# Patient Record
Sex: FEMALE | Race: WHITE | Employment: OTHER | URBAN - METROPOLITAN AREA
[De-identification: names, ages, dates, MRNs, and addresses within clinical notes are randomized per-mention and may not be internally consistent; named-entity substitution may affect disease eponyms.]

---

## 2017-02-10 ENCOUNTER — HOSPITAL ENCOUNTER (OUTPATIENT)
Dept: MAMMOGRAPHY | Age: 82
Discharge: HOME | End: 2017-02-10
Attending: INTERNAL MEDICINE
Payer: MEDICARE

## 2017-02-10 DIAGNOSIS — Z12.31 VISIT FOR SCREENING MAMMOGRAM: ICD-10-CM

## 2017-02-10 PROCEDURE — 77067 SCR MAMMO BI INCL CAD: CPT

## 2017-07-10 ENCOUNTER — HOSPITAL ENCOUNTER (OUTPATIENT)
Dept: GENERAL RADIOLOGY | Age: 82
Discharge: HOME | End: 2017-07-10
Attending: INTERNAL MEDICINE
Payer: MEDICARE

## 2017-07-10 DIAGNOSIS — M1A.9XX1 TOPHUS: ICD-10-CM

## 2017-07-10 PROCEDURE — 73140 X-RAY EXAM OF FINGER(S): CPT

## 2017-07-12 PROBLEM — M1A.9XX1 TOPHACEOUS GOUT: Status: ACTIVE | Noted: 2017-07-12

## 2017-08-12 ENCOUNTER — APPOINTMENT (OUTPATIENT)
Dept: CT IMAGING | Age: 82
End: 2017-08-12
Attending: EMERGENCY MEDICINE
Payer: MEDICARE

## 2017-08-12 ENCOUNTER — HOSPITAL ENCOUNTER (EMERGENCY)
Age: 82
Discharge: HOME | End: 2017-08-12
Attending: EMERGENCY MEDICINE | Admitting: EMERGENCY MEDICINE
Payer: MEDICARE

## 2017-08-12 ENCOUNTER — APPOINTMENT (OUTPATIENT)
Dept: GENERAL RADIOLOGY | Age: 82
End: 2017-08-12
Attending: EMERGENCY MEDICINE
Payer: MEDICARE

## 2017-08-12 VITALS
HEART RATE: 67 BPM | WEIGHT: 140 LBS | DIASTOLIC BLOOD PRESSURE: 57 MMHG | OXYGEN SATURATION: 98 % | BODY MASS INDEX: 27.48 KG/M2 | SYSTOLIC BLOOD PRESSURE: 99 MMHG | TEMPERATURE: 98.4 F | HEIGHT: 60 IN | RESPIRATION RATE: 15 BRPM

## 2017-08-12 DIAGNOSIS — I25.10 CORONARY ARTERY CALCIFICATION: ICD-10-CM

## 2017-08-12 DIAGNOSIS — R42 ORTHOSTATIC DIZZINESS: Primary | ICD-10-CM

## 2017-08-12 DIAGNOSIS — I25.84 CORONARY ARTERY CALCIFICATION: ICD-10-CM

## 2017-08-12 DIAGNOSIS — Z86.69 H/O MIGRAINE: ICD-10-CM

## 2017-08-12 LAB
ALBUMIN SERPL BCP-MCNC: 3.4 G/DL (ref 3.5–5)
ALBUMIN/GLOB SERPL: 0.9 {RATIO} (ref 1.1–2.2)
ALP SERPL-CCNC: 70 U/L (ref 45–117)
ALT SERPL-CCNC: 19 U/L (ref 12–78)
ANION GAP BLD CALC-SCNC: 8 MMOL/L (ref 5–15)
APPEARANCE UR: CLEAR
AST SERPL W P-5'-P-CCNC: 20 U/L (ref 15–37)
BACTERIA URNS QL MICRO: NEGATIVE /HPF
BASOPHILS # BLD AUTO: 0 K/UL (ref 0–0.1)
BASOPHILS # BLD: 0 % (ref 0–1)
BILIRUB SERPL-MCNC: 0.4 MG/DL (ref 0.2–1)
BILIRUB UR QL: NEGATIVE
BUN SERPL-MCNC: 21 MG/DL (ref 6–20)
BUN/CREAT SERPL: 19 (ref 12–20)
CALCIUM SERPL-MCNC: 9 MG/DL (ref 8.5–10.1)
CHLORIDE SERPL-SCNC: 105 MMOL/L (ref 97–108)
CO2 SERPL-SCNC: 28 MMOL/L (ref 21–32)
COLOR UR: NORMAL
CREAT SERPL-MCNC: 1.1 MG/DL (ref 0.55–1.02)
EOSINOPHIL # BLD: 0.1 K/UL (ref 0–0.4)
EOSINOPHIL NFR BLD: 1 % (ref 0–7)
EPITH CASTS URNS QL MICRO: NORMAL /LPF
ERYTHROCYTE [DISTWIDTH] IN BLOOD BY AUTOMATED COUNT: 15.9 % (ref 11.5–14.5)
GLOBULIN SER CALC-MCNC: 3.9 G/DL (ref 2–4)
GLUCOSE SERPL-MCNC: 111 MG/DL (ref 65–100)
GLUCOSE UR STRIP.AUTO-MCNC: NEGATIVE MG/DL
HCT VFR BLD AUTO: 37.6 % (ref 35–47)
HGB BLD-MCNC: 11.9 G/DL (ref 11.5–16)
HGB UR QL STRIP: NEGATIVE
HYALINE CASTS URNS QL MICRO: NORMAL /LPF (ref 0–5)
KETONES UR QL STRIP.AUTO: NEGATIVE MG/DL
LEUKOCYTE ESTERASE UR QL STRIP.AUTO: NEGATIVE
LYMPHOCYTES # BLD AUTO: 45 % (ref 12–49)
LYMPHOCYTES # BLD: 4 K/UL (ref 0.8–3.5)
MCH RBC QN AUTO: 31.1 PG (ref 26–34)
MCHC RBC AUTO-ENTMCNC: 31.6 G/DL (ref 30–36.5)
MCV RBC AUTO: 98.2 FL (ref 80–99)
MONOCYTES # BLD: 0.7 K/UL (ref 0–1)
MONOCYTES NFR BLD AUTO: 8 % (ref 5–13)
NEUTS SEG # BLD: 4 K/UL (ref 1.8–8)
NEUTS SEG NFR BLD AUTO: 46 % (ref 32–75)
NITRITE UR QL STRIP.AUTO: NEGATIVE
PH UR STRIP: 5.5 [PH] (ref 5–8)
PLATELET # BLD AUTO: 303 K/UL (ref 150–400)
POTASSIUM SERPL-SCNC: 3.6 MMOL/L (ref 3.5–5.1)
PROT SERPL-MCNC: 7.3 G/DL (ref 6.4–8.2)
PROT UR STRIP-MCNC: NEGATIVE MG/DL
RBC # BLD AUTO: 3.83 M/UL (ref 3.8–5.2)
RBC #/AREA URNS HPF: NORMAL /HPF (ref 0–5)
SODIUM SERPL-SCNC: 141 MMOL/L (ref 136–145)
SP GR UR REFRACTOMETRY: 1.01 (ref 1–1.03)
TROPONIN I BLD-MCNC: <0.04 NG/ML (ref 0–0.08)
TROPONIN I SERPL-MCNC: <0.04 NG/ML
UROBILINOGEN UR QL STRIP.AUTO: 0.2 EU/DL (ref 0.2–1)
WBC # BLD AUTO: 8.8 K/UL (ref 3.6–11)
WBC URNS QL MICRO: NORMAL /HPF (ref 0–4)

## 2017-08-12 PROCEDURE — 70450 CT HEAD/BRAIN W/O DYE: CPT

## 2017-08-12 PROCEDURE — 84484 ASSAY OF TROPONIN QUANT: CPT | Performed by: EMERGENCY MEDICINE

## 2017-08-12 PROCEDURE — 96360 HYDRATION IV INFUSION INIT: CPT

## 2017-08-12 PROCEDURE — 70496 CT ANGIOGRAPHY HEAD: CPT

## 2017-08-12 PROCEDURE — 99285 EMERGENCY DEPT VISIT HI MDM: CPT

## 2017-08-12 PROCEDURE — 70498 CT ANGIOGRAPHY NECK: CPT

## 2017-08-12 PROCEDURE — 74011000258 HC RX REV CODE- 258: Performed by: EMERGENCY MEDICINE

## 2017-08-12 PROCEDURE — 94762 N-INVAS EAR/PLS OXIMTRY CONT: CPT

## 2017-08-12 PROCEDURE — 81001 URINALYSIS AUTO W/SCOPE: CPT | Performed by: EMERGENCY MEDICINE

## 2017-08-12 PROCEDURE — 85025 COMPLETE CBC W/AUTO DIFF WBC: CPT | Performed by: EMERGENCY MEDICINE

## 2017-08-12 PROCEDURE — 93005 ELECTROCARDIOGRAM TRACING: CPT

## 2017-08-12 PROCEDURE — 74011250636 HC RX REV CODE- 250/636: Performed by: EMERGENCY MEDICINE

## 2017-08-12 PROCEDURE — 71010 XR CHEST PORT: CPT

## 2017-08-12 PROCEDURE — 80053 COMPREHEN METABOLIC PANEL: CPT | Performed by: EMERGENCY MEDICINE

## 2017-08-12 PROCEDURE — 74011636320 HC RX REV CODE- 636/320: Performed by: EMERGENCY MEDICINE

## 2017-08-12 PROCEDURE — 87086 URINE CULTURE/COLONY COUNT: CPT | Performed by: EMERGENCY MEDICINE

## 2017-08-12 PROCEDURE — 36415 COLL VENOUS BLD VENIPUNCTURE: CPT | Performed by: EMERGENCY MEDICINE

## 2017-08-12 RX ORDER — SODIUM CHLORIDE 0.9 % (FLUSH) 0.9 %
10 SYRINGE (ML) INJECTION
Status: COMPLETED | OUTPATIENT
Start: 2017-08-12 | End: 2017-08-12

## 2017-08-12 RX ADMIN — SODIUM CHLORIDE 1000 ML: 900 INJECTION, SOLUTION INTRAVENOUS at 16:09

## 2017-08-12 RX ADMIN — Medication 10 ML: at 17:51

## 2017-08-12 RX ADMIN — SODIUM CHLORIDE 100 ML: 900 INJECTION, SOLUTION INTRAVENOUS at 17:51

## 2017-08-12 RX ADMIN — IOPAMIDOL 100 ML: 755 INJECTION, SOLUTION INTRAVENOUS at 17:51

## 2017-08-12 NOTE — ED PROVIDER NOTES
HPI Comments: 80 y.o. female with past medical history significant for venous insufficiency, left breast cancer, CAD, tophaceous gout, and hysterectomy who presents from home with chief complaint of syncopal episode. The pt reports that she was visiting her son in a surgical recovery room in the hospital when she felt sudden onset clamminess , floaters in vision, and dizziness leading to a syncopal episode 1 hour ago. The friend at bedside reports that she caught the pt before she hit the floor and she was unresponsive for a few minutes. The pt reports that her leg was dragging but she attributed it to chronic sciatic problems. The pt ate breakfast today but not lunch. The pt also had a left neck pain/posterior HA last night rated at 5/10 but it is not current. The pt has chronic hives and took Benadryl last night and this morning. The pt has not taken Benadryl prior to that for at least a week. The pt reports that she has not had a migraine in a few years and they usually present as a frontal HA. The pt had stents placed 2 years ago. The pt reports reduced fluid take over the last few days. The pt has been taken hypotensive in the past. The pt denies CP, SOB, urinary sx, cough, sluggish arm, wheezing, abdominal pain, slurred speech, and exertional SOB. There are no other acute medical concerns at this time. Social hx: nonsmoker, no EtOH use  PCP: Jesus Hope MD    Note written by Philly Bell, as dictated by Jessica Dinero MD 2:34 PM      The history is provided by the patient. No  was used.         Past Medical History:   Diagnosis Date    Breast cancer ([address] 1997    lumpectomy    Cancer ([address]     dcis left breast 2002    Coronary artery disease involving native coronary artery of native heart without angina pectoris 12/16/2016    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    Venous insufficiency 5/17/2011       Past Surgical History:   Procedure Laterality Date    HX BREAST LUMPECTOMY dcis lelf breast    HX HYSTERECTOMY      VASCULAR SURGERY PROCEDURE UNLIST      varicose veins         Family History:   Problem Relation Age of Onset    Cancer Sister      breast cancer    Breast Cancer Sister 61       Social History     Social History    Marital status:      Spouse name: N/A    Number of children: N/A    Years of education: N/A     Occupational History    Not on file. Social History Main Topics    Smoking status: Never Smoker    Smokeless tobacco: Never Used    Alcohol use No    Drug use: Not on file    Sexual activity: Not on file     Other Topics Concern    Not on file     Social History Narrative         ALLERGIES: Amoxicillin-pot clavulanate; Cephalexin; Doxycycline; Neomycin-polymyxin-hc; and Sulfamethoxazole-trimethoprim    Review of Systems   Constitutional: Negative for activity change, diaphoresis, fatigue and fever. Clammy   HENT: Negative for congestion and sore throat. Eyes: Positive for visual disturbance (floaters). Negative for photophobia. Respiratory: Negative for cough, chest tightness, shortness of breath and wheezing. Cardiovascular: Negative for chest pain, palpitations and leg swelling. Gastrointestinal: Negative for abdominal pain, blood in stool, constipation, diarrhea, nausea and vomiting. Genitourinary: Negative for difficulty urinating, dysuria, flank pain, frequency and hematuria. Musculoskeletal: Negative for back pain. Neurological: Positive for dizziness, syncope and headaches. Negative for speech difficulty and numbness. All other systems reviewed and are negative. Vitals:    08/12/17 1338 08/12/17 1340 08/12/17 1345 08/12/17 1347   BP: 124/61 124/61 109/68    Pulse: 78 75 87    Resp: 11 16 21    Temp:  98.6 °F (37 °C)     SpO2:  94% 92% 95%   Weight:  63.5 kg (140 lb)     Height:  5' (1.524 m)              Physical Exam   Constitutional: She is oriented to person, place, and time.  She appears well-developed and well-nourished. No distress. HENT:   Head: Normocephalic and atraumatic. Nose: Nose normal.   Mouth/Throat: Oropharynx is clear and moist. No oropharyngeal exudate. Eyes: Conjunctivae and EOM are normal. Right eye exhibits no discharge. Left eye exhibits no discharge. No scleral icterus. Neck: Normal range of motion. Neck supple. No JVD present. No tracheal deviation present. No thyromegaly present. Cardiovascular: Normal rate, regular rhythm, normal heart sounds and intact distal pulses. Exam reveals no gallop and no friction rub. No murmur heard. Pulmonary/Chest: Effort normal and breath sounds normal. No stridor. No respiratory distress. She has no wheezes. She has no rales. She exhibits no tenderness. Abdominal: Bowel sounds are normal. She exhibits no distension and no mass. There is no tenderness. There is no rebound. Musculoskeletal: Normal range of motion. She exhibits no edema or tenderness. Lymphadenopathy:     She has no cervical adenopathy. Neurological: She is alert and oriented to person, place, and time. No cranial nerve deficit. Coordination normal.   Skin: Skin is warm and dry. No rash noted. She is not diaphoretic. No erythema. No pallor. Psychiatric: She has a normal mood and affect. Her behavior is normal. Judgment and thought content normal.   Nursing note and vitals reviewed. Note written by Philly Sanchez, as dictated by Darleen Gerber MD 2:41 PM      [facility]  ED Course       Procedures  ED EKG interpretation:  Rhythm: normal sinus rhythm; and regular . Rate (approx.): 76; Axis: normal; ST/T wave: left ventricular hypertrophy with repolarization abnormality; inferior infarct, age undetermined; Note written by Philly Sanchez, as dictated by Darleen Gerber MD 2:41 PM      7:42 PM  The pt does not currently have a HA or any other sx. Will discharge the pt home.

## 2017-08-12 NOTE — DISCHARGE INSTRUCTIONS
Dizziness: Care Instructions  Your Care Instructions  Dizziness is the feeling of unsteadiness or fuzziness in your head. It is different than having vertigo, which is a feeling that the room is spinning or that you are moving or falling. It is also different from lightheadedness, which is the feeling that you are about to faint. It can be hard to know what causes dizziness. Some people feel dizzy when they have migraine headaches. Sometimes bouts of flu can make you feel dizzy. Some medical conditions, such as heart problems or high blood pressure, can make you feel dizzy. Many medicines can cause dizziness, including medicines for high blood pressure, pain, or anxiety. If a medicine causes your symptoms, your doctor may recommend that you stop or change the medicine. If it is a problem with your heart, you may need medicine to help your heart work better. If there is no clear reason for your symptoms, your doctor may suggest watching and waiting for a while to see if the dizziness goes away on its own. Follow-up care is a key part of your treatment and safety. Be sure to make and go to all appointments, and call your doctor if you are having problems. It's also a good idea to know your test results and keep a list of the medicines you take. How can you care for yourself at home? · If your doctor recommends or prescribes medicine, take it exactly as directed. Call your doctor if you think you are having a problem with your medicine. · Do not drive while you feel dizzy. · Try to prevent falls. Steps you can take include:  ¨ Using nonskid mats, adding grab bars near the tub, and using night-lights. ¨ Clearing your home so that walkways are free of anything you might trip on. ¨ Letting family and friends know that you have been feeling dizzy. This will help them know how to help you. When should you call for help? Call 911 anytime you think you may need emergency care.  For example, call if:  · You passed out (lost consciousness). · You have dizziness along with symptoms of a heart attack. These may include:  ¨ Chest pain or pressure, or a strange feeling in the chest.  ¨ Sweating. ¨ Shortness of breath. ¨ Nausea or vomiting. ¨ Pain, pressure, or a strange feeling in the back, neck, jaw, or upper belly or in one or both shoulders or arms. ¨ Lightheadedness or sudden weakness. ¨ A fast or irregular heartbeat. · You have symptoms of a stroke. These may include:  ¨ Sudden numbness, tingling, weakness, or loss of movement in your face, arm, or leg, especially on only one side of your body. ¨ Sudden vision changes. ¨ Sudden trouble speaking. ¨ Sudden confusion or trouble understanding simple statements. ¨ Sudden problems with walking or balance. ¨ A sudden, severe headache that is different from past headaches. Call your doctor now or seek immediate medical care if:  · You feel dizzy and have a fever, headache, or ringing in your ears. · You have new or increased nausea and vomiting. · Your dizziness does not go away or comes back. Watch closely for changes in your health, and be sure to contact your doctor if:  · You do not get better as expected. Where can you learn more? Go to http://hetal-skylar.info/. Enter I230 in the search box to learn more about \"Dizziness: Care Instructions. \"  Current as of: March 20, 2017  Content Version: 11.3  © 1533-8483 [employer] Care instructions adapted under license by [employer] (which disclaims liability or warranty for this information). If you have questions about a medical condition or this instruction, always ask your healthcare professional. [place] [address] any warranty or liability for your use of this information. We hope that we have addressed all of your medical concerns.  The examination and treatment you received in the Emergency Department were for an emergent problem and were not intended as complete care. It is important that you follow up with your healthcare provider(s) for ongoing care. If your symptoms worsen or do not improve as expected, and you are unable to reach your usual health care provider(s), you should return to the Emergency Department. Today's healthcare is undergoing tremendous change, and patient satisfaction surveys are one of the many tools to assess the quality of medical care. You may receive a survey from the [employer] regarding your experience in the Emergency Department. I hope that your experience has been completely positive, particularly the medical care that I provided. As such, please participate in the survey; anything less than excellent does not meet my expectations or intentions. [facility] and [facility] participate in nationally recognized quality of care measures. If your blood pressure is greater than 120/80, as reported below, we urge that you seek medical care to address the potential of high blood pressure, commonly known as hypertension. Hypertension can be hereditary or can be caused by certain medical conditions, pain, stress, or \"white coat syndrome. \"       Please make an appointment with your health care provider(s) for follow up of your Emergency Department visit. VITALS:   Patient Vitals for the past 8 hrs:   Temp Pulse Resp BP SpO2   08/12/17 1900 - 68 16 115/63 100 %   08/12/17 1830 - 77 12 116/80 98 %   08/12/17 1816 - 78 11 115/64 99 %   08/12/17 1730 - 66 15 117/71 100 %   08/12/17 1700 - 75 18 118/68 100 %   08/12/17 1630 - 69 12 135/78 99 %   08/12/17 1600 - 75 13 109/66 95 %   08/12/17 1500 - 75 19 112/63 95 %   08/12/17 1347 - - - - 95 %   08/12/17 1345 - 87 21 109/68 92 %   08/12/17 1340 98.6 °F (37 °C) 75 16 124/61 94 %   08/12/17 1338 - 78 11 124/61 -          Thank you for allowing us to provide you with medical care today.   We realize that you have many choices for your emergency care needs. Please choose us in the future for any continued health care needs. Robbi Cramer Severe, [address]   Office: [phone]            Recent Results (from the past 24 hour(s))   EKG, 12 LEAD, INITIAL    Collection Time: 08/12/17  1:46 PM   Result Value Ref Range    Ventricular Rate 76 BPM    Atrial Rate 76 BPM    P-R Interval 154 ms    QRS Duration 62 ms    Q-T Interval 366 ms    QTC Calculation (Bezet) 411 ms    Calculated P Axis 9 degrees    Calculated R Axis -17 degrees    Calculated T Axis 63 degrees    Diagnosis       Normal sinus rhythm  Left ventricular hypertrophy with repolarization abnormality  Inferior infarct (cited on or before 08-SEP-2014)  When compared with ECG of 08-SEP-2014 12:01,  premature ventricular complexes are no longer present  QRS duration has decreased     CBC WITH AUTOMATED DIFF    Collection Time: 08/12/17  1:48 PM   Result Value Ref Range    WBC 8.8 3.6 - 11.0 K/uL    RBC 3.83 3.80 - 5.20 M/uL    HGB 11.9 11.5 - 16.0 g/dL    HCT 37.6 35.0 - 47.0 %    MCV 98.2 80.0 - 99.0 FL    MCH 31.1 26.0 - 34.0 PG    MCHC 31.6 30.0 - 36.5 g/dL    RDW 15.9 (H) 11.5 - 14.5 %    PLATELET 805 750 - 550 K/uL    NEUTROPHILS 46 32 - 75 %    LYMPHOCYTES 45 12 - 49 %    MONOCYTES 8 5 - 13 %    EOSINOPHILS 1 0 - 7 %    BASOPHILS 0 0 - 1 %    ABS. NEUTROPHILS 4.0 1.8 - 8.0 K/UL    ABS. LYMPHOCYTES 4.0 (H) 0.8 - 3.5 K/UL    ABS. MONOCYTES 0.7 0.0 - 1.0 K/UL    ABS. EOSINOPHILS 0.1 0.0 - 0.4 K/UL    ABS.  BASOPHILS 0.0 0.0 - 0.1 K/UL   METABOLIC PANEL, COMPREHENSIVE    Collection Time: 08/12/17  1:48 PM   Result Value Ref Range    Sodium 141 136 - 145 mmol/L    Potassium 3.6 3.5 - 5.1 mmol/L    Chloride 105 97 - 108 mmol/L    CO2 28 21 - 32 mmol/L    Anion gap 8 5 - 15 mmol/L    Glucose 111 (H) 65 - 100 mg/dL    BUN 21 (H) 6 - 20 MG/DL    Creatinine 1.10 (H) 0.55 - 1.02 MG/DL    BUN/Creatinine ratio 19 12 - 20      GFR est AA 57 (L) >60 ml/min/1.73m2    GFR est non-AA 47 (L) >60 ml/min/1.73m2    Calcium 9.0 8.5 - 10.1 MG/DL    Bilirubin, total 0.4 0.2 - 1.0 MG/DL    ALT (SGPT) 19 12 - 78 U/L    AST (SGOT) 20 15 - 37 U/L    Alk. phosphatase 70 45 - 117 U/L    Protein, total 7.3 6.4 - 8.2 g/dL    Albumin 3.4 (L) 3.5 - 5.0 g/dL    Globulin 3.9 2.0 - 4.0 g/dL    A-G Ratio 0.9 (L) 1.1 - 2.2     TROPONIN I    Collection Time: 08/12/17  1:48 PM   Result Value Ref Range    Troponin-I, Qt. <0.04 <0.05 ng/mL       Ct Head Wo Cont    Result Date: 8/12/2017  EXAM:  CT HEAD WO CONT History: Syncope, blurred vision INDICATION:   syncope with LOC COMPARISON: 9/8/2014. CONTRAST:  None. TECHNIQUE: Unenhanced CT of the head was performed using 5 mm images. Brain and bone windows were generated. CT dose reduction was achieved through use of a standardized protocol tailored for this examination and automatic exposure control for dose modulation. FINDINGS: There are ventricular hypodensities. . Mild sulcal and ventricular prominence. . There is no intracranial hemorrhage, extra-axial collection, mass, mass effect or midline shift. The basilar cisterns are open. No acute infarct is identified. The bone windows demonstrate no abnormalities. The visualized portions of the paranasal sinuses and mastoid air cells are clear. IMPRESSION: No acute intracranial process     Cta Head    Result Date: 8/12/2017  Prelim report: Hypoplastic right A1 segment. No intracranial arterial stenosis. No carotid or vertebral artery stenosis or occlusion. atherosclerotic calcifications in the left and right carotid bulbs. Final report to follow. Cta Neck    Result Date: 8/12/2017  Prelim report: Hypoplastic right A1 segment. No intracranial arterial stenosis. No carotid or vertebral artery stenosis or occlusion. atherosclerotic calcifications in the left and right carotid bulbs. Final report to follow.     Xr Chest Port    Result Date: 8/12/2017  INDICATION: Syncope, loss consciousness. Dizziness, blurred vision. Portable AP semiupright view of the chest. There is no prior study for direct comparison. Cardiomediastinal silhouette is within normal limits. Lungs are clear bilaterally. Pleural spaces are normal. Osseous structures are intact. IMPRESSION: No acute cardiopulmonary disease.

## 2017-08-12 NOTE — ED TRIAGE NOTES
Pt rapid response from 5E for syncopal event. Pt was visiting her son and  who are both patient's on 5E. Pt's son witnessed syncopal episode with LOC for a few seconds. Pt reports feeling dizzy with blurred vision in both eyes prior to episode. Pt reports symptoms have resolved at this time.  Denies CP or sob

## 2017-08-13 LAB
ATRIAL RATE: 76 BPM
CALCULATED P AXIS, ECG09: 9 DEGREES
CALCULATED R AXIS, ECG10: -17 DEGREES
CALCULATED T AXIS, ECG11: 63 DEGREES
DIAGNOSIS, 93000: NORMAL
P-R INTERVAL, ECG05: 154 MS
Q-T INTERVAL, ECG07: 366 MS
QRS DURATION, ECG06: 62 MS
QTC CALCULATION (BEZET), ECG08: 411 MS
VENTRICULAR RATE, ECG03: 76 BPM

## 2017-08-13 NOTE — ED NOTES
Bedside shift change report given to [facility] (oncoming nurse) by Henrry Miller RN (offgoing nurse). Report included the following information SBAR, ED Summary, MAR and Recent Results.

## 2017-08-14 LAB
BACTERIA SPEC CULT: NORMAL
CC UR VC: NORMAL
SERVICE CMNT-IMP: NORMAL

## 2018-04-05 ENCOUNTER — HOSPITAL ENCOUNTER (OUTPATIENT)
Dept: PREADMISSION TESTING | Age: 83
Discharge: HOME | End: 2018-04-05
Payer: MEDICARE

## 2018-04-05 VITALS
BODY MASS INDEX: 29.06 KG/M2 | HEART RATE: 67 BPM | SYSTOLIC BLOOD PRESSURE: 146 MMHG | TEMPERATURE: 97.7 F | HEIGHT: 60 IN | DIASTOLIC BLOOD PRESSURE: 80 MMHG | WEIGHT: 148 LBS

## 2018-04-05 LAB
ABO + RH BLD: NORMAL
ANION GAP SERPL CALC-SCNC: 7 MMOL/L (ref 5–15)
APPEARANCE UR: CLEAR
ATRIAL RATE: 55 BPM
BACTERIA URNS QL MICRO: NEGATIVE /HPF
BILIRUB UR QL: NEGATIVE
BLOOD GROUP ANTIBODIES SERPL: NORMAL
BUN SERPL-MCNC: 20 MG/DL (ref 6–20)
BUN/CREAT SERPL: 19 (ref 12–20)
CALCIUM SERPL-MCNC: 9.6 MG/DL (ref 8.5–10.1)
CALCULATED P AXIS, ECG09: 60 DEGREES
CALCULATED R AXIS, ECG10: -13 DEGREES
CALCULATED T AXIS, ECG11: 12 DEGREES
CHLORIDE SERPL-SCNC: 103 MMOL/L (ref 97–108)
CO2 SERPL-SCNC: 31 MMOL/L (ref 21–32)
COLOR UR: ABNORMAL
CREAT SERPL-MCNC: 1.03 MG/DL (ref 0.55–1.02)
DIAGNOSIS, 93000: NORMAL
EPITH CASTS URNS QL MICRO: ABNORMAL /LPF
ERYTHROCYTE [DISTWIDTH] IN BLOOD BY AUTOMATED COUNT: 14.7 % (ref 11.5–14.5)
EST. AVERAGE GLUCOSE BLD GHB EST-MCNC: 131 MG/DL
GLUCOSE SERPL-MCNC: 109 MG/DL (ref 65–100)
GLUCOSE UR STRIP.AUTO-MCNC: NEGATIVE MG/DL
HBA1C MFR BLD: 6.2 % (ref 4.2–6.3)
HCT VFR BLD AUTO: 37.1 % (ref 35–47)
HGB BLD-MCNC: 11.8 G/DL (ref 11.5–16)
HGB UR QL STRIP: NEGATIVE
HYALINE CASTS URNS QL MICRO: ABNORMAL /LPF (ref 0–5)
INR PPP: 1 (ref 0.9–1.1)
KETONES UR QL STRIP.AUTO: NEGATIVE MG/DL
LEUKOCYTE ESTERASE UR QL STRIP.AUTO: ABNORMAL
MCH RBC QN AUTO: 31.5 PG (ref 26–34)
MCHC RBC AUTO-ENTMCNC: 31.8 G/DL (ref 30–36.5)
MCV RBC AUTO: 98.9 FL (ref 80–99)
NITRITE UR QL STRIP.AUTO: NEGATIVE
NRBC # BLD: 0 K/UL (ref 0–0.01)
NRBC BLD-RTO: 0 PER 100 WBC
P-R INTERVAL, ECG05: 182 MS
PH UR STRIP: 6.5 [PH] (ref 5–8)
PLATELET # BLD AUTO: 215 K/UL (ref 150–400)
PMV BLD AUTO: 11.1 FL (ref 8.9–12.9)
POTASSIUM SERPL-SCNC: 4.4 MMOL/L (ref 3.5–5.1)
PROT UR STRIP-MCNC: NEGATIVE MG/DL
PROTHROMBIN TIME: 10.1 SEC (ref 9–11.1)
Q-T INTERVAL, ECG07: 442 MS
QRS DURATION, ECG06: 80 MS
QTC CALCULATION (BEZET), ECG08: 422 MS
RBC # BLD AUTO: 3.75 M/UL (ref 3.8–5.2)
RBC #/AREA URNS HPF: ABNORMAL /HPF (ref 0–5)
SODIUM SERPL-SCNC: 141 MMOL/L (ref 136–145)
SP GR UR REFRACTOMETRY: 1.01 (ref 1–1.03)
SPECIMEN EXP DATE BLD: NORMAL
UA: UC IF INDICATED,UAUC: ABNORMAL
UROBILINOGEN UR QL STRIP.AUTO: 0.2 EU/DL (ref 0.2–1)
VENTRICULAR RATE, ECG03: 55 BPM
WBC # BLD AUTO: 7.7 K/UL (ref 3.6–11)
WBC URNS QL MICRO: ABNORMAL /HPF (ref 0–4)

## 2018-04-05 PROCEDURE — 83036 HEMOGLOBIN GLYCOSYLATED A1C: CPT | Performed by: ORTHOPAEDIC SURGERY

## 2018-04-05 PROCEDURE — 36415 COLL VENOUS BLD VENIPUNCTURE: CPT | Performed by: ORTHOPAEDIC SURGERY

## 2018-04-05 PROCEDURE — 85027 COMPLETE CBC AUTOMATED: CPT | Performed by: ORTHOPAEDIC SURGERY

## 2018-04-05 PROCEDURE — 86900 BLOOD TYPING SEROLOGIC ABO: CPT | Performed by: ORTHOPAEDIC SURGERY

## 2018-04-05 PROCEDURE — 80048 BASIC METABOLIC PNL TOTAL CA: CPT | Performed by: ORTHOPAEDIC SURGERY

## 2018-04-05 PROCEDURE — 85610 PROTHROMBIN TIME: CPT | Performed by: ORTHOPAEDIC SURGERY

## 2018-04-05 PROCEDURE — 93005 ELECTROCARDIOGRAM TRACING: CPT

## 2018-04-05 PROCEDURE — 81001 URINALYSIS AUTO W/SCOPE: CPT | Performed by: ORTHOPAEDIC SURGERY

## 2018-04-06 LAB
BACTERIA SPEC CULT: NORMAL
BACTERIA SPEC CULT: NORMAL
SERVICE CMNT-IMP: NORMAL

## 2018-04-12 NOTE — H&P
Jas Ghotra  Location: [place] [address] [facility]  Patient #: [id number]  : 1933   / Language: [place] / Race: White  Female      History of Present Illness   The patient is a 80year old female who presents to the practice today for a transition into care. Additional reasons for visit:    Hip Pain is described as the following: The hip pain is described as severe. The hip pain is described as being located in the hip. Relieving factors include heat and ice. Assistive devices have included cane and crutch. Note for \"Hip Pain\": Patient presents today for an opinion regarding her left lower extremity pain. About 10 months ago she started having increased symptoms. She developed pain both down her back and in her anterior thigh. It was a bit of a diagnostic dilemma. She was treated both for sciatica and her hip pain. Her symptoms progress. She's not using a cane to get around in her right hand. She has lost significant hip motion. She's having problems getting her shoes and socks on on the left side. She was recently seen by one of our spine PAs and x-rays showed severe DJD of her left hip. She presents for an opinion regarding her left hip pain. Problem List/Past Medical   Osteoarthritis of left hip, unspecified osteoarthritis type (715.95  M16.12)    Weight above 97th percentile (V49.89  Z78.9)    REVIEW OF SYSTEMS: Systems were reviewed by the provider.    Left lumbar radiculitis (724.4  M54.16)    Spondylolisthesis, lumbar region (738.4  M43.16)      Allergies  Augmentin *PENICILLINS*    Bactrim *ANTI-INFECTIVE AGENTS - MISC. *    Doxycycline Hyclate *TETRACYCLINES*    Cephalexin *CEPHALOSPORINS*    Allergies Reconciled      Social History  Caffeine use   Occasionally. Current work status   Part-time. Exercise   cycling. Marital status   . No alcohol use    No drug use    Seat Belt Use   Always uses seat belts. Sun Exposure   Occasionally.   Tobacco / smoke exposure   None.  Tobacco use   Never smoker. Medication History   Allopurinol  (300MG Tablet, Oral) Active. Gabapentin  (300MG Capsule, 1 (one) Capsule Oral tid, Taken starting 01/04/2018) Active. (called in [facility])  Lisinopril  (10MG Tablet, Oral) Active. Bumetanide  (2MG Tablet, Oral) Active. TraMADol HCl  (50MG Tablet, 1 (one) Tablet Oral evey8-12 hrs prn, Taken starting 01/04/2018) Active. ([facility])  Atorvastatin Calcium  (20MG Tablet, Oral) Active. Metoprolol Succinate ER  (50MG Tablet ER 24HR, Oral) Active. Vitamin D  (Oral) Specific strength unknown - Active. Plavix  (75MG Tablet, Oral) Active. Aspirin  (81MG Tablet, Oral) Active. Medications Reconciled     Pregnancy / Birth History  Pregnant   No.    Past Surgical History  Breast Mass; Local Excision   bilateral  Cataract Surgery   bilateral  Hysterectomy   non-cancerous: complete  Other Heart Procedures   Stent  Tubal Ligation      Other Problems  Allergic Urticaria    Cerebrovascular Accident    Heart disease    Lumbar spinal stenosis (724.02  M48.061)          Review of Systems  General Not Present- Chills and Fatigue. Skin Not Present- Bruising, Pallor and Skin Color Changes. Respiratory Not Present- Cough and Difficulty Breathing. Cardiovascular Not Present- Chest Pain, Fainting / Blacking Out and Rapid Heart Rate. Musculoskeletal Not Present- Decreased Range of Motion, Joint Pain and Joint Swelling. Neurological Not Present- Dysesthesia, Paresthesias and Weakness In Extremities. Hematology Not Present- Abnormal Bleeding, Blood Clots and Petechiae. Vitals  Weight: 140 lb   Height: 60 in   Body Surface Area: 1.6 m²   Body Mass Index: 27.34 kg/m²          Physical Exam  Musculoskeletal  Global Assessment  Examination of related systems reveals - well-developed, well-nourished, in no acute distress, alert and oriented x 3. Left Lower Extremity - Note: Patient walks with a cane. She has significant limp when she walks.  Seated she has severe pain to rotation of her left hip. Left hip has significant diminished rotation. Hip does not extend fully and flexes to about 85°. 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 Seated stage IV AVN. Significant collapse on lateral x-ray. Discussed this with her cardiologist. She is indicated for hip replacement surgery should she decide to proceed. She's had a stroke which seems to have resolved. Her cardiologist states that her ejection fraction is around 40% in her cardiac status is stable. Current Plans  Pt Education - How to access health information online: discussed with patient and provided information. Pt Education - Educational materials were provided.: discussed with patient and provided information. X-RAY EXAM OF HIP COMPLETE min 2 VIEWS (43624) (left reviewed her x-rays. Patient has collapse avascular necrosis of her femoral head.  This is best seen on the lateral view.)  REVIEW OF SYSTEMS: Systems were reviewed by the provider.(V49.9)  Left hip pain (719.45  M25.552)        Signed by Elgin Cannon MD

## 2018-04-17 ENCOUNTER — ANESTHESIA EVENT (OUTPATIENT)
Dept: SURGERY | Age: 83
DRG: 470 | End: 2018-04-17
Payer: MEDICARE

## 2018-04-17 ENCOUNTER — ANESTHESIA (OUTPATIENT)
Dept: SURGERY | Age: 83
DRG: 470 | End: 2018-04-17
Payer: MEDICARE

## 2018-04-17 ENCOUNTER — APPOINTMENT (OUTPATIENT)
Dept: GENERAL RADIOLOGY | Age: 83
DRG: 470 | End: 2018-04-17
Attending: ORTHOPAEDIC SURGERY
Payer: MEDICARE

## 2018-04-17 ENCOUNTER — APPOINTMENT (OUTPATIENT)
Dept: GENERAL RADIOLOGY | Age: 83
DRG: 470 | End: 2018-04-17
Attending: PHYSICIAN ASSISTANT
Payer: MEDICARE

## 2018-04-17 ENCOUNTER — HOSPITAL ENCOUNTER (INPATIENT)
Age: 83
LOS: 3 days | Discharge: NURSING, REHAB OR LONG-TERM CARE | DRG: 470 | End: 2018-04-20
Attending: ORTHOPAEDIC SURGERY | Admitting: ORTHOPAEDIC SURGERY
Payer: MEDICARE

## 2018-04-17 DIAGNOSIS — I87.2 VENOUS INSUFFICIENCY: Primary | ICD-10-CM

## 2018-04-17 LAB
GLUCOSE BLD STRIP.AUTO-MCNC: 94 MG/DL (ref 65–100)
SERVICE CMNT-IMP: NORMAL

## 2018-04-17 PROCEDURE — 73501 X-RAY EXAM HIP UNI 1 VIEW: CPT

## 2018-04-17 PROCEDURE — 65270000029 HC RM PRIVATE

## 2018-04-17 PROCEDURE — 97116 GAIT TRAINING THERAPY: CPT | Performed by: PHYSICAL THERAPIST

## 2018-04-17 PROCEDURE — 77030013079 HC BLNKT BAIR HGGR 3M -A: Performed by: ANESTHESIOLOGY

## 2018-04-17 PROCEDURE — 74011000250 HC RX REV CODE- 250

## 2018-04-17 PROCEDURE — 77030018836 HC SOL IRR NACL ICUM -A: Performed by: ORTHOPAEDIC SURGERY

## 2018-04-17 PROCEDURE — 77030031139 HC SUT VCRL2 J&J -A: Performed by: ORTHOPAEDIC SURGERY

## 2018-04-17 PROCEDURE — 82962 GLUCOSE BLOOD TEST: CPT

## 2018-04-17 PROCEDURE — 77030018846 HC SOL IRR STRL H20 ICUM -A: Performed by: ORTHOPAEDIC SURGERY

## 2018-04-17 PROCEDURE — 74011250636 HC RX REV CODE- 250/636

## 2018-04-17 PROCEDURE — 0SRB04A REPLACEMENT OF LEFT HIP JOINT WITH CERAMIC ON POLYETHYLENE SYNTHETIC SUBSTITUTE, UNCEMENTED, OPEN APPROACH: ICD-10-PCS | Performed by: ORTHOPAEDIC SURGERY

## 2018-04-17 PROCEDURE — C9290 INJ, BUPIVACAINE LIPOSOME: HCPCS | Performed by: ORTHOPAEDIC SURGERY

## 2018-04-17 PROCEDURE — 74011250636 HC RX REV CODE- 250/636: Performed by: PHYSICIAN ASSISTANT

## 2018-04-17 PROCEDURE — C1776 JOINT DEVICE (IMPLANTABLE): HCPCS | Performed by: ORTHOPAEDIC SURGERY

## 2018-04-17 PROCEDURE — 77030012935 HC DRSG AQUACEL BMS -B: Performed by: ORTHOPAEDIC SURGERY

## 2018-04-17 PROCEDURE — 74011000250 HC RX REV CODE- 250: Performed by: ORTHOPAEDIC SURGERY

## 2018-04-17 PROCEDURE — 74011250636 HC RX REV CODE- 250/636: Performed by: ANESTHESIOLOGY

## 2018-04-17 PROCEDURE — 77030008467 HC STPLR SKN COVD -B: Performed by: ORTHOPAEDIC SURGERY

## 2018-04-17 PROCEDURE — 76000 FLUOROSCOPY <1 HR PHYS/QHP: CPT

## 2018-04-17 PROCEDURE — P9045 ALBUMIN (HUMAN), 5%, 250 ML: HCPCS

## 2018-04-17 PROCEDURE — 77030011640 HC PAD GRND REM COVD -A: Performed by: ORTHOPAEDIC SURGERY

## 2018-04-17 PROCEDURE — 74011250636 HC RX REV CODE- 250/636: Performed by: ORTHOPAEDIC SURGERY

## 2018-04-17 PROCEDURE — 74011250637 HC RX REV CODE- 250/637: Performed by: PHYSICIAN ASSISTANT

## 2018-04-17 PROCEDURE — 97161 PT EVAL LOW COMPLEX 20 MIN: CPT | Performed by: PHYSICAL THERAPIST

## 2018-04-17 PROCEDURE — 76010000171 HC OR TIME 2 TO 2.5 HR INTENSV-TIER 1: Performed by: ORTHOPAEDIC SURGERY

## 2018-04-17 PROCEDURE — 77030008684 HC TU ET CUF COVD -B: Performed by: ANESTHESIOLOGY

## 2018-04-17 PROCEDURE — 76060000035 HC ANESTHESIA 2 TO 2.5 HR: Performed by: ORTHOPAEDIC SURGERY

## 2018-04-17 PROCEDURE — 76210000017 HC OR PH I REC 1.5 TO 2 HR: Performed by: ORTHOPAEDIC SURGERY

## 2018-04-17 PROCEDURE — 74011250637 HC RX REV CODE- 250/637: Performed by: ORTHOPAEDIC SURGERY

## 2018-04-17 PROCEDURE — 77030034850: Performed by: ORTHOPAEDIC SURGERY

## 2018-04-17 PROCEDURE — 77030020782 HC GWN BAIR PAWS FLX 3M -B

## 2018-04-17 PROCEDURE — 77030006783 HC BLD SAW OSC MCRA -A: Performed by: ORTHOPAEDIC SURGERY

## 2018-04-17 PROCEDURE — 74011000258 HC RX REV CODE- 258: Performed by: ORTHOPAEDIC SURGERY

## 2018-04-17 DEVICE — PINNACLE CANCELLOUS BONE SCREW 6.5MM X 35MM
Type: IMPLANTABLE DEVICE | Site: HIP | Status: FUNCTIONAL
Brand: PINNACLE

## 2018-04-17 DEVICE — PINNACLE GRIPTION ACETABULAR SHELL SECTOR 50MM OD
Type: IMPLANTABLE DEVICE | Site: HIP | Status: FUNCTIONAL
Brand: PINNACLE GRIPTION

## 2018-04-17 DEVICE — APEX HOLE ELIMINATOR - PS
Type: IMPLANTABLE DEVICE | Site: HIP | Status: FUNCTIONAL
Brand: APEX

## 2018-04-17 DEVICE — BIOLOX DELTA CERAMIC FEMORAL HEAD 32MM DIA +1 12/14 TAPER
Type: IMPLANTABLE DEVICE | Site: HIP | Status: FUNCTIONAL
Brand: BIOLOX DELTA

## 2018-04-17 DEVICE — PINNACLE HIP SOLUTIONS ALTRX POLYETHYLENE ACETABULAR LINER NEUTRAL 32MM ID 50MM OD
Type: IMPLANTABLE DEVICE | Site: HIP | Status: FUNCTIONAL
Brand: PINNACLE ALTRX

## 2018-04-17 DEVICE — PINNACLE CANCELLOUS BONE SCREW 6.5MM X 15MM
Type: IMPLANTABLE DEVICE | Site: HIP | Status: FUNCTIONAL
Brand: PINNACLE

## 2018-04-17 DEVICE — CORAIL HIP SYSTEM CEMENTLESS FEMORAL STEM 12/14 AMT 135 DEGREES KA SIZE 10 HA COATED STANDARD COLLAR
Type: IMPLANTABLE DEVICE | Site: HIP | Status: FUNCTIONAL
Brand: CORAIL

## 2018-04-17 DEVICE — COMPONENT TOT HIP PRIMARY CERM ALTRX: Type: IMPLANTABLE DEVICE | Status: FUNCTIONAL

## 2018-04-17 RX ORDER — TRAMADOL HYDROCHLORIDE 50 MG/1
50 TABLET ORAL
Status: DISCONTINUED | OUTPATIENT
Start: 2018-04-17 | End: 2018-04-20 | Stop reason: HOSPADM

## 2018-04-17 RX ORDER — ACETAMINOPHEN 500 MG
1000 TABLET ORAL EVERY 6 HOURS
Status: DISCONTINUED | OUTPATIENT
Start: 2018-04-17 | End: 2018-04-20 | Stop reason: HOSPADM

## 2018-04-17 RX ORDER — SODIUM CHLORIDE 0.9 % (FLUSH) 0.9 %
5-10 SYRINGE (ML) INJECTION AS NEEDED
Status: DISCONTINUED | OUTPATIENT
Start: 2018-04-17 | End: 2018-04-17 | Stop reason: HOSPADM

## 2018-04-17 RX ORDER — SUCCINYLCHOLINE CHLORIDE 20 MG/ML
INJECTION INTRAMUSCULAR; INTRAVENOUS AS NEEDED
Status: DISCONTINUED | OUTPATIENT
Start: 2018-04-17 | End: 2018-04-17 | Stop reason: HOSPADM

## 2018-04-17 RX ORDER — OXYCODONE HYDROCHLORIDE 5 MG/1
10 TABLET ORAL
Status: DISCONTINUED | OUTPATIENT
Start: 2018-04-17 | End: 2018-04-20 | Stop reason: HOSPADM

## 2018-04-17 RX ORDER — PHENYLEPHRINE HCL IN 0.9% NACL 0.4MG/10ML
SYRINGE (ML) INTRAVENOUS AS NEEDED
Status: DISCONTINUED | OUTPATIENT
Start: 2018-04-17 | End: 2018-04-17 | Stop reason: HOSPADM

## 2018-04-17 RX ORDER — SODIUM CHLORIDE 0.9 % (FLUSH) 0.9 %
5-10 SYRINGE (ML) INJECTION EVERY 8 HOURS
Status: DISCONTINUED | OUTPATIENT
Start: 2018-04-18 | End: 2018-04-20 | Stop reason: HOSPADM

## 2018-04-17 RX ORDER — MIDAZOLAM HYDROCHLORIDE 1 MG/ML
0.5 INJECTION, SOLUTION INTRAMUSCULAR; INTRAVENOUS
Status: DISCONTINUED | OUTPATIENT
Start: 2018-04-17 | End: 2018-04-17 | Stop reason: HOSPADM

## 2018-04-17 RX ORDER — HYDROMORPHONE HYDROCHLORIDE 2 MG/ML
0.2 INJECTION, SOLUTION INTRAMUSCULAR; INTRAVENOUS; SUBCUTANEOUS
Status: COMPLETED | OUTPATIENT
Start: 2018-04-17 | End: 2018-04-17

## 2018-04-17 RX ORDER — OXYCODONE HYDROCHLORIDE 5 MG/1
5 TABLET ORAL AS NEEDED
Status: DISCONTINUED | OUTPATIENT
Start: 2018-04-17 | End: 2018-04-17 | Stop reason: HOSPADM

## 2018-04-17 RX ORDER — SODIUM CHLORIDE, SODIUM LACTATE, POTASSIUM CHLORIDE, CALCIUM CHLORIDE 600; 310; 30; 20 MG/100ML; MG/100ML; MG/100ML; MG/100ML
1000 INJECTION, SOLUTION INTRAVENOUS CONTINUOUS
Status: DISCONTINUED | OUTPATIENT
Start: 2018-04-17 | End: 2018-04-17 | Stop reason: HOSPADM

## 2018-04-17 RX ORDER — SODIUM CHLORIDE, SODIUM LACTATE, POTASSIUM CHLORIDE, CALCIUM CHLORIDE 600; 310; 30; 20 MG/100ML; MG/100ML; MG/100ML; MG/100ML
INJECTION, SOLUTION INTRAVENOUS
Status: DISCONTINUED | OUTPATIENT
Start: 2018-04-17 | End: 2018-04-17 | Stop reason: HOSPADM

## 2018-04-17 RX ORDER — POLYETHYLENE GLYCOL 3350 17 G/17G
17 POWDER, FOR SOLUTION ORAL DAILY
Status: DISCONTINUED | OUTPATIENT
Start: 2018-04-18 | End: 2018-04-20 | Stop reason: HOSPADM

## 2018-04-17 RX ORDER — ONDANSETRON 2 MG/ML
4 INJECTION INTRAMUSCULAR; INTRAVENOUS AS NEEDED
Status: DISCONTINUED | OUTPATIENT
Start: 2018-04-17 | End: 2018-04-17 | Stop reason: HOSPADM

## 2018-04-17 RX ORDER — MIDAZOLAM HYDROCHLORIDE 1 MG/ML
INJECTION, SOLUTION INTRAMUSCULAR; INTRAVENOUS AS NEEDED
Status: DISCONTINUED | OUTPATIENT
Start: 2018-04-17 | End: 2018-04-17 | Stop reason: HOSPADM

## 2018-04-17 RX ORDER — MORPHINE SULFATE 10 MG/ML
2 INJECTION, SOLUTION INTRAMUSCULAR; INTRAVENOUS
Status: DISCONTINUED | OUTPATIENT
Start: 2018-04-17 | End: 2018-04-17 | Stop reason: HOSPADM

## 2018-04-17 RX ORDER — LIDOCAINE HYDROCHLORIDE 10 MG/ML
0.1 INJECTION, SOLUTION EPIDURAL; INFILTRATION; INTRACAUDAL; PERINEURAL AS NEEDED
Status: DISCONTINUED | OUTPATIENT
Start: 2018-04-17 | End: 2018-04-17 | Stop reason: HOSPADM

## 2018-04-17 RX ORDER — SODIUM CHLORIDE 9 MG/ML
125 INJECTION, SOLUTION INTRAVENOUS CONTINUOUS
Status: DISPENSED | OUTPATIENT
Start: 2018-04-17 | End: 2018-04-18

## 2018-04-17 RX ORDER — SODIUM CHLORIDE 9 MG/ML
25 INJECTION, SOLUTION INTRAVENOUS CONTINUOUS
Status: DISCONTINUED | OUTPATIENT
Start: 2018-04-17 | End: 2018-04-17 | Stop reason: HOSPADM

## 2018-04-17 RX ORDER — NITROGLYCERIN 0.4 MG/1
0.4 TABLET SUBLINGUAL AS NEEDED
Status: DISCONTINUED | OUTPATIENT
Start: 2018-04-17 | End: 2018-04-20 | Stop reason: HOSPADM

## 2018-04-17 RX ORDER — NEOSTIGMINE METHYLSULFATE 1 MG/ML
INJECTION INTRAVENOUS AS NEEDED
Status: DISCONTINUED | OUTPATIENT
Start: 2018-04-17 | End: 2018-04-17 | Stop reason: HOSPADM

## 2018-04-17 RX ORDER — CLOPIDOGREL BISULFATE 75 MG/1
75 TABLET ORAL DAILY
Status: DISCONTINUED | OUTPATIENT
Start: 2018-04-18 | End: 2018-04-20 | Stop reason: HOSPADM

## 2018-04-17 RX ORDER — CEFAZOLIN SODIUM/WATER 2 G/20 ML
2 SYRINGE (ML) INTRAVENOUS ONCE
Status: COMPLETED | OUTPATIENT
Start: 2018-04-17 | End: 2018-04-17

## 2018-04-17 RX ORDER — ACETAMINOPHEN 500 MG
1000 TABLET ORAL ONCE
Status: COMPLETED | OUTPATIENT
Start: 2018-04-17 | End: 2018-04-17

## 2018-04-17 RX ORDER — EPHEDRINE SULFATE 50 MG/ML
INJECTION, SOLUTION INTRAVENOUS AS NEEDED
Status: DISCONTINUED | OUTPATIENT
Start: 2018-04-17 | End: 2018-04-17 | Stop reason: HOSPADM

## 2018-04-17 RX ORDER — METOPROLOL SUCCINATE 50 MG/1
50 TABLET, EXTENDED RELEASE ORAL DAILY
Status: DISCONTINUED | OUTPATIENT
Start: 2018-04-18 | End: 2018-04-19

## 2018-04-17 RX ORDER — DIPHENHYDRAMINE HCL 12.5MG/5ML
12.5 ELIXIR ORAL
Status: ACTIVE | OUTPATIENT
Start: 2018-04-17 | End: 2018-04-18

## 2018-04-17 RX ORDER — FENTANYL CITRATE 50 UG/ML
25 INJECTION, SOLUTION INTRAMUSCULAR; INTRAVENOUS
Status: DISCONTINUED | OUTPATIENT
Start: 2018-04-17 | End: 2018-04-17 | Stop reason: HOSPADM

## 2018-04-17 RX ORDER — FENTANYL CITRATE 50 UG/ML
INJECTION, SOLUTION INTRAMUSCULAR; INTRAVENOUS AS NEEDED
Status: DISCONTINUED | OUTPATIENT
Start: 2018-04-17 | End: 2018-04-17 | Stop reason: HOSPADM

## 2018-04-17 RX ORDER — TRANEXAMIC ACID 100 MG/ML
INJECTION, SOLUTION INTRAVENOUS AS NEEDED
Status: DISCONTINUED | OUTPATIENT
Start: 2018-04-17 | End: 2018-04-17 | Stop reason: HOSPADM

## 2018-04-17 RX ORDER — CELECOXIB 200 MG/1
200 CAPSULE ORAL ONCE
Status: COMPLETED | OUTPATIENT
Start: 2018-04-17 | End: 2018-04-17

## 2018-04-17 RX ORDER — PROPOFOL 10 MG/ML
INJECTION, EMULSION INTRAVENOUS AS NEEDED
Status: DISCONTINUED | OUTPATIENT
Start: 2018-04-17 | End: 2018-04-17 | Stop reason: HOSPADM

## 2018-04-17 RX ORDER — MORPHINE SULFATE 10 MG/ML
INJECTION, SOLUTION INTRAMUSCULAR; INTRAVENOUS AS NEEDED
Status: DISCONTINUED | OUTPATIENT
Start: 2018-04-17 | End: 2018-04-17 | Stop reason: HOSPADM

## 2018-04-17 RX ORDER — GABAPENTIN 300 MG/1
300 CAPSULE ORAL 3 TIMES DAILY
Status: DISCONTINUED | OUTPATIENT
Start: 2018-04-17 | End: 2018-04-20 | Stop reason: HOSPADM

## 2018-04-17 RX ORDER — ONDANSETRON 2 MG/ML
INJECTION INTRAMUSCULAR; INTRAVENOUS AS NEEDED
Status: DISCONTINUED | OUTPATIENT
Start: 2018-04-17 | End: 2018-04-17 | Stop reason: HOSPADM

## 2018-04-17 RX ORDER — DIPHENHYDRAMINE HYDROCHLORIDE 50 MG/ML
12.5 INJECTION, SOLUTION INTRAMUSCULAR; INTRAVENOUS AS NEEDED
Status: DISCONTINUED | OUTPATIENT
Start: 2018-04-17 | End: 2018-04-17 | Stop reason: HOSPADM

## 2018-04-17 RX ORDER — AMOXICILLIN 250 MG
1 CAPSULE ORAL 2 TIMES DAILY
Status: DISCONTINUED | OUTPATIENT
Start: 2018-04-17 | End: 2018-04-20 | Stop reason: HOSPADM

## 2018-04-17 RX ORDER — ROCURONIUM BROMIDE 10 MG/ML
INJECTION, SOLUTION INTRAVENOUS AS NEEDED
Status: DISCONTINUED | OUTPATIENT
Start: 2018-04-17 | End: 2018-04-17 | Stop reason: HOSPADM

## 2018-04-17 RX ORDER — CEFAZOLIN SODIUM/WATER 2 G/20 ML
2 SYRINGE (ML) INTRAVENOUS EVERY 8 HOURS
Status: COMPLETED | OUTPATIENT
Start: 2018-04-17 | End: 2018-04-18

## 2018-04-17 RX ORDER — SODIUM CHLORIDE 0.9 % (FLUSH) 0.9 %
5-10 SYRINGE (ML) INJECTION AS NEEDED
Status: DISCONTINUED | OUTPATIENT
Start: 2018-04-17 | End: 2018-04-20 | Stop reason: HOSPADM

## 2018-04-17 RX ORDER — NALOXONE HYDROCHLORIDE 0.4 MG/ML
0.4 INJECTION, SOLUTION INTRAMUSCULAR; INTRAVENOUS; SUBCUTANEOUS AS NEEDED
Status: DISCONTINUED | OUTPATIENT
Start: 2018-04-17 | End: 2018-04-20 | Stop reason: HOSPADM

## 2018-04-17 RX ORDER — CELECOXIB 200 MG/1
200 CAPSULE ORAL 2 TIMES DAILY
Status: DISCONTINUED | OUTPATIENT
Start: 2018-04-17 | End: 2018-04-20 | Stop reason: HOSPADM

## 2018-04-17 RX ORDER — DEXAMETHASONE SODIUM PHOSPHATE 10 MG/ML
4 INJECTION INTRAMUSCULAR; INTRAVENOUS ONCE
Status: DISCONTINUED | OUTPATIENT
Start: 2018-04-17 | End: 2018-04-17 | Stop reason: HOSPADM

## 2018-04-17 RX ORDER — ROPIVACAINE HYDROCHLORIDE 5 MG/ML
150 INJECTION, SOLUTION EPIDURAL; INFILTRATION; PERINEURAL AS NEEDED
Status: DISCONTINUED | OUTPATIENT
Start: 2018-04-17 | End: 2018-04-17 | Stop reason: HOSPADM

## 2018-04-17 RX ORDER — SODIUM CHLORIDE, SODIUM LACTATE, POTASSIUM CHLORIDE, CALCIUM CHLORIDE 600; 310; 30; 20 MG/100ML; MG/100ML; MG/100ML; MG/100ML
100 INJECTION, SOLUTION INTRAVENOUS CONTINUOUS
Status: DISCONTINUED | OUTPATIENT
Start: 2018-04-17 | End: 2018-04-17 | Stop reason: HOSPADM

## 2018-04-17 RX ORDER — FACIAL-BODY WIPES
10 EACH TOPICAL DAILY PRN
Status: DISCONTINUED | OUTPATIENT
Start: 2018-04-19 | End: 2018-04-20 | Stop reason: HOSPADM

## 2018-04-17 RX ORDER — ONDANSETRON 2 MG/ML
4 INJECTION INTRAMUSCULAR; INTRAVENOUS
Status: ACTIVE | OUTPATIENT
Start: 2018-04-17 | End: 2018-04-18

## 2018-04-17 RX ORDER — MIDAZOLAM HYDROCHLORIDE 1 MG/ML
1 INJECTION, SOLUTION INTRAMUSCULAR; INTRAVENOUS AS NEEDED
Status: DISCONTINUED | OUTPATIENT
Start: 2018-04-17 | End: 2018-04-17 | Stop reason: HOSPADM

## 2018-04-17 RX ORDER — ASPIRIN 81 MG/1
81 TABLET ORAL DAILY
Status: DISCONTINUED | OUTPATIENT
Start: 2018-04-18 | End: 2018-04-20 | Stop reason: HOSPADM

## 2018-04-17 RX ORDER — ALBUMIN HUMAN 50 G/1000ML
SOLUTION INTRAVENOUS
Status: COMPLETED
Start: 2018-04-17 | End: 2018-04-17

## 2018-04-17 RX ORDER — GLYCOPYRROLATE 0.2 MG/ML
INJECTION INTRAMUSCULAR; INTRAVENOUS AS NEEDED
Status: DISCONTINUED | OUTPATIENT
Start: 2018-04-17 | End: 2018-04-17 | Stop reason: HOSPADM

## 2018-04-17 RX ORDER — ALBUMIN HUMAN 50 G/1000ML
12.5 SOLUTION INTRAVENOUS ONCE
Status: COMPLETED | OUTPATIENT
Start: 2018-04-17 | End: 2018-04-17

## 2018-04-17 RX ORDER — ALLOPURINOL 100 MG/1
150 TABLET ORAL DAILY
Status: DISCONTINUED | OUTPATIENT
Start: 2018-04-18 | End: 2018-04-20 | Stop reason: HOSPADM

## 2018-04-17 RX ORDER — MORPHINE SULFATE 4 MG/ML
INJECTION, SOLUTION INTRAMUSCULAR; INTRAVENOUS AS NEEDED
Status: DISCONTINUED | OUTPATIENT
Start: 2018-04-17 | End: 2018-04-17 | Stop reason: HOSPADM

## 2018-04-17 RX ORDER — MORPHINE SULFATE 10 MG/ML
2 INJECTION, SOLUTION INTRAMUSCULAR; INTRAVENOUS
Status: DISCONTINUED | OUTPATIENT
Start: 2018-04-17 | End: 2018-04-20 | Stop reason: HOSPADM

## 2018-04-17 RX ORDER — FENTANYL CITRATE 50 UG/ML
50 INJECTION, SOLUTION INTRAMUSCULAR; INTRAVENOUS AS NEEDED
Status: DISCONTINUED | OUTPATIENT
Start: 2018-04-17 | End: 2018-04-17 | Stop reason: HOSPADM

## 2018-04-17 RX ORDER — SODIUM CHLORIDE 0.9 % (FLUSH) 0.9 %
5-10 SYRINGE (ML) INJECTION EVERY 8 HOURS
Status: DISCONTINUED | OUTPATIENT
Start: 2018-04-17 | End: 2018-04-17 | Stop reason: HOSPADM

## 2018-04-17 RX ADMIN — MIDAZOLAM HYDROCHLORIDE 0.5 MG: 1 INJECTION, SOLUTION INTRAMUSCULAR; INTRAVENOUS at 10:48

## 2018-04-17 RX ADMIN — HYDROMORPHONE HYDROCHLORIDE 0.2 MG: 2 INJECTION INTRAMUSCULAR; INTRAVENOUS; SUBCUTANEOUS at 12:55

## 2018-04-17 RX ADMIN — EPHEDRINE SULFATE 10 MG: 50 INJECTION, SOLUTION INTRAVENOUS at 10:04

## 2018-04-17 RX ADMIN — EPHEDRINE SULFATE 10 MG: 50 INJECTION, SOLUTION INTRAVENOUS at 10:13

## 2018-04-17 RX ADMIN — MORPHINE SULFATE 3 MG: 10 INJECTION, SOLUTION INTRAMUSCULAR; INTRAVENOUS at 09:45

## 2018-04-17 RX ADMIN — HYDROMORPHONE HYDROCHLORIDE 0.2 MG: 2 INJECTION INTRAMUSCULAR; INTRAVENOUS; SUBCUTANEOUS at 12:15

## 2018-04-17 RX ADMIN — Medication 80 MCG: at 08:40

## 2018-04-17 RX ADMIN — Medication 2 G: at 08:50

## 2018-04-17 RX ADMIN — MIDAZOLAM HYDROCHLORIDE 1 MG: 1 INJECTION, SOLUTION INTRAMUSCULAR; INTRAVENOUS at 08:25

## 2018-04-17 RX ADMIN — SODIUM CHLORIDE 125 ML/HR: 900 INJECTION, SOLUTION INTRAVENOUS at 12:00

## 2018-04-17 RX ADMIN — CELECOXIB 200 MG: 200 CAPSULE ORAL at 17:51

## 2018-04-17 RX ADMIN — ROCURONIUM BROMIDE 5 MG: 10 INJECTION, SOLUTION INTRAVENOUS at 08:34

## 2018-04-17 RX ADMIN — GLYCOPYRROLATE 0.4 MG: 0.2 INJECTION INTRAMUSCULAR; INTRAVENOUS at 10:10

## 2018-04-17 RX ADMIN — NEOSTIGMINE METHYLSULFATE 2.5 MG: 1 INJECTION INTRAVENOUS at 10:10

## 2018-04-17 RX ADMIN — Medication 80 MCG: at 10:13

## 2018-04-17 RX ADMIN — Medication 40 MCG: at 09:56

## 2018-04-17 RX ADMIN — STANDARDIZED SENNA CONCENTRATE AND DOCUSATE SODIUM 1 TABLET: 8.6; 5 TABLET, FILM COATED ORAL at 17:51

## 2018-04-17 RX ADMIN — PROPOFOL 30 MG: 10 INJECTION, EMULSION INTRAVENOUS at 09:20

## 2018-04-17 RX ADMIN — FENTANYL CITRATE 100 MCG: 50 INJECTION, SOLUTION INTRAMUSCULAR; INTRAVENOUS at 08:34

## 2018-04-17 RX ADMIN — Medication 80 MCG: at 09:25

## 2018-04-17 RX ADMIN — SODIUM CHLORIDE, SODIUM LACTATE, POTASSIUM CHLORIDE, CALCIUM CHLORIDE: 600; 310; 30; 20 INJECTION, SOLUTION INTRAVENOUS at 09:40

## 2018-04-17 RX ADMIN — GLYCOPYRROLATE 0.1 MG: 0.2 INJECTION INTRAMUSCULAR; INTRAVENOUS at 09:59

## 2018-04-17 RX ADMIN — HYDROMORPHONE HYDROCHLORIDE 0.2 MG: 2 INJECTION INTRAMUSCULAR; INTRAVENOUS; SUBCUTANEOUS at 11:40

## 2018-04-17 RX ADMIN — CELECOXIB 200 MG: 200 CAPSULE ORAL at 08:26

## 2018-04-17 RX ADMIN — ACETAMINOPHEN 1000 MG: 500 TABLET, FILM COATED ORAL at 08:26

## 2018-04-17 RX ADMIN — BENZOCAINE AND MENTHOL 1 LOZENGE: 15; 3.6 LOZENGE ORAL at 22:20

## 2018-04-17 RX ADMIN — SODIUM CHLORIDE, SODIUM LACTATE, POTASSIUM CHLORIDE, AND CALCIUM CHLORIDE 1000 ML: 600; 310; 30; 20 INJECTION, SOLUTION INTRAVENOUS at 08:18

## 2018-04-17 RX ADMIN — Medication 80 MCG: at 09:04

## 2018-04-17 RX ADMIN — SODIUM CHLORIDE, SODIUM LACTATE, POTASSIUM CHLORIDE, CALCIUM CHLORIDE: 600; 310; 30; 20 INJECTION, SOLUTION INTRAVENOUS at 08:25

## 2018-04-17 RX ADMIN — ONDANSETRON 4 MG: 2 INJECTION INTRAMUSCULAR; INTRAVENOUS at 10:10

## 2018-04-17 RX ADMIN — MORPHINE SULFATE 4 MG: 10 INJECTION, SOLUTION INTRAMUSCULAR; INTRAVENOUS at 10:36

## 2018-04-17 RX ADMIN — HYDROMORPHONE HYDROCHLORIDE 0.2 MG: 2 INJECTION INTRAMUSCULAR; INTRAVENOUS; SUBCUTANEOUS at 11:24

## 2018-04-17 RX ADMIN — SUCCINYLCHOLINE CHLORIDE 140 MG: 20 INJECTION INTRAMUSCULAR; INTRAVENOUS at 08:35

## 2018-04-17 RX ADMIN — TRAMADOL HYDROCHLORIDE 50 MG: 50 TABLET, FILM COATED ORAL at 19:43

## 2018-04-17 RX ADMIN — ALBUMIN (HUMAN) 12.5 G: 12.5 INJECTION, SOLUTION INTRAVENOUS at 12:14

## 2018-04-17 RX ADMIN — ACETAMINOPHEN 1000 MG: 500 TABLET, FILM COATED ORAL at 17:51

## 2018-04-17 RX ADMIN — Medication 2 G: at 16:23

## 2018-04-17 RX ADMIN — Medication 80 MCG: at 09:35

## 2018-04-17 RX ADMIN — GABAPENTIN 300 MG: 300 CAPSULE ORAL at 22:20

## 2018-04-17 RX ADMIN — MORPHINE SULFATE 2 MG: 4 INJECTION, SOLUTION INTRAMUSCULAR; INTRAVENOUS at 10:58

## 2018-04-17 RX ADMIN — MORPHINE SULFATE 2 MG: 4 INJECTION, SOLUTION INTRAMUSCULAR; INTRAVENOUS at 10:48

## 2018-04-17 RX ADMIN — ROCURONIUM BROMIDE 25 MG: 10 INJECTION, SOLUTION INTRAVENOUS at 08:47

## 2018-04-17 RX ADMIN — Medication 40 MCG: at 09:52

## 2018-04-17 RX ADMIN — MORPHINE SULFATE 3 MG: 10 INJECTION, SOLUTION INTRAMUSCULAR; INTRAVENOUS at 09:20

## 2018-04-17 RX ADMIN — GABAPENTIN 300 MG: 300 CAPSULE ORAL at 16:23

## 2018-04-17 RX ADMIN — Medication 40 MCG: at 10:01

## 2018-04-17 RX ADMIN — MIDAZOLAM HYDROCHLORIDE 0.5 MG: 1 INJECTION, SOLUTION INTRAMUSCULAR; INTRAVENOUS at 10:43

## 2018-04-17 RX ADMIN — HYDROMORPHONE HYDROCHLORIDE 0.2 MG: 2 INJECTION INTRAMUSCULAR; INTRAVENOUS; SUBCUTANEOUS at 11:55

## 2018-04-17 RX ADMIN — ALBUMIN HUMAN 12.5 G: 50 SOLUTION INTRAVENOUS at 12:14

## 2018-04-17 RX ADMIN — PROPOFOL 120 MG: 10 INJECTION, EMULSION INTRAVENOUS at 08:34

## 2018-04-17 RX ADMIN — Medication 80 MCG: at 09:03

## 2018-04-17 NOTE — ANESTHESIA POSTPROCEDURE EVALUATION
Post-Anesthesia Evaluation and Assessment    Patient: Zenon Berg MRN: [id number]  SSN: [id number]    YOB: 1933  Age: 80 y.o. Sex: female       Cardiovascular Function/Vital Signs  Visit Vitals    /58    Pulse 68    Temp 36.4 °C (97.6 °F)    Resp 11    Ht 5' (1.524 m)    Wt 67.1 kg (148 lb)    SpO2 96%    BMI 28.9 kg/m2       Patient is status post general anesthesia for Procedure(s):  LEFT TOTAL HIP ARTHROPLASTY (LATEX FREE ROOM). Nausea/Vomiting: None    Postoperative hydration reviewed and adequate. Pain:  Pain Scale 1: Numeric (0 - 10) (04/17/18 0757)  Pain Intensity 1: 0 (04/17/18 0757)   Managed    Neurological Status:   Neuro (WDL): Within Defined Limits (04/17/18 0807)   At baseline    Mental Status and Level of Consciousness: Arousable    Pulmonary Status:   O2 Device: Room air (04/17/18 1057)   Adequate oxygenation and airway patent    Complications related to anesthesia: None    Post-anesthesia assessment completed.  No concerns    Signed By: Rekha Hammond MD     April 17, 2018

## 2018-04-17 NOTE — PROGRESS NOTES
TRANSFER - OUT REPORT:    Verbal report given to Dante on Dimitri Riedel  being transferred to [address] [place] [address] for routine progression of care       Report consisted of patients Situation, Background, Assessment and   Recommendations(SBAR). Time Pre op antibiotic given:0850  Anesthesia Stop time: 1059  Cartwright Present on Transfer to floor:y  Order for Cartwright on Chart:y  Discharge Prescriptions with Chart:n    Information from the following report(s) SBAR, Kardex, OR Summary, Procedure Summary, Intake/Output, MAR, Recent Results and Med Rec Status was reviewed with the receiving nurse. Opportunity for questions and clarification was provided. Is the patient on 02? NO       L/Min        Other     Is the patient on a monitor? NO    Is the nurse transporting with the patient? NO    Surgical Waiting Area notified of patient's transfer from PACU? YES      The following personal items collected during your admission accompanied patient upon transfer:   Dental Appliance: Dental Appliances: Other (comment) (caps all teeth)  Vision:    Hearing Aid:    Jewelry:    Clothing: Clothing: Other (comment) (clothing)  Other Valuables:  Other Valuables: Cane  Valuables sent to safe:

## 2018-04-17 NOTE — PERIOP NOTES
16 fr two way bee placed by Kyle Srinivasan RN. Clear yellow urine noted in catheter. Spoke with patients family reference start and status of procedure.

## 2018-04-17 NOTE — IP AVS SNAPSHOT
[address] [address] 
[phone] Patient: Azar Conklin MRN: [id number] [facility]:[id number] A check ahmet indicates which time of day the medication should be taken. My Medications START taking these medications Instructions Each Dose to Equal  
 Morning Noon Evening Bedtime  
 oxyCODONE IR 5 mg immediate release tablet Commonly known as:  Tierney Shukla Your last dose was: Your next dose is: Take 0.5-1 Tabs by mouth every three (3) hours as needed. Max Daily Amount: 40 mg.  
 2.5-5 mg CONTINUE taking these medications Instructions Each Dose to Equal  
 Morning Noon Evening Bedtime  
 allopurinol 300 mg tablet Commonly known as:  Vena Ditch Your last dose was: Your next dose is: Take 1/2 tablet by mouth daily. For tophaceous gout  
     
   
   
   
  
 aspirin delayed-release 81 mg tablet Your last dose was: Your next dose is: Take 81 mg by mouth daily. 81 mg  
    
   
   
   
  
 atorvastatin 20 mg tablet Commonly known as:  LIPITOR Your last dose was: Your next dose is: Take 20 mg by mouth nightly. 20 mg  
    
   
   
   
  
 bumetanide 2 mg tablet Commonly known as:  Leanord Miu Your last dose was: Your next dose is: Take 2 mg by mouth daily. 2 mg  
    
   
   
   
  
 gabapentin 300 mg capsule Commonly known as:  NEURONTIN Your last dose was: Your next dose is: TAKE 1 CAPSULE BY MOUTH THREE TIMES A DAY  
     
   
   
   
  
 lisinopril 10 mg tablet Commonly known as:  Higinio Boehringer Your last dose was: Your next dose is: Take 10 mg by mouth. 10 mg  
    
   
   
   
  
 metoprolol succinate 50 mg XL tablet Commonly known as:  TOPROL-XL Your last dose was: Your next dose is: TAKE 1&1/2 TABLETS BY MOUTH EVERY HS  
     
   
   
   
  
 nitroglycerin 0.4 mg SL tablet Commonly known as:  NITROSTAT Your last dose was: Your next dose is: PLAVIX 75 mg Tab Generic drug:  clopidogrel Your last dose was: Your next dose is: Take 75 mg by mouth daily. 75 mg  
    
   
   
   
  
 traMADol 50 mg tablet Commonly known as:  ULTRAM  
   
Your last dose was: Your next dose is: Take 1 Tab by mouth every six (6) hours as needed for Pain. Max Daily Amount: 200 mg.  
 50 mg  
    
   
   
   
  
 triamcinolone acetonide 0.025 % topical cream  
Commonly known as:  KENALOG Your last dose was: Your next dose is:    
   
   
 Apply  to affected area. VITAMIN D3 1,000 unit Cap Generic drug:  cholecalciferol Your last dose was: Your next dose is: Take 1,000 Units by mouth daily. 1000 Units Where to Get Your Medications Information on where to get these meds will be given to you by the nurse or doctor. ! Ask your nurse or doctor about these medications  
  oxyCODONE IR 5 mg immediate release tablet

## 2018-04-17 NOTE — PROGRESS NOTES
TRANSFER - IN REPORT:    Verbal report received from Genesis(name) on Sandra Mood  being received from [employer]) for routine post - op      Report consisted of patients Situation, Background, Assessment and   Recommendations(SBAR). Information from the following report(s) SBAR was reviewed with the receiving nurse. Opportunity for questions and clarification was provided. Assessment completed upon patients arrival to unit and care assumed.

## 2018-04-17 NOTE — PROGRESS NOTES
Bedside and Verbal shift change report given to Chiquis Frey (oncoming nurse) by Jovita Pierce (offgoing nurse). Report included the following information SBAR.

## 2018-04-17 NOTE — OP NOTES
Name: Jas Ghotra  MRN:  [id number]  : 1933  Age:  80 y.o. Surgery Date: 2018      OPERATIVE REPORT - LEFT TOTAL HIP ARTHROPLASTY -   ANTERIOR APPROACH    PREOPERATIVE DIAGNOSIS: Osteoarthritis, left hip. POSTOPERATIVE DIAGNOSIS: Osteoarthritis, left hip. PROCEDURE PERFORMED: Left total hip arthroplasty. SURGEON: Devyn Ragsdale MD    FIRST ASSISTANTMary Kennedy    ANESTHESIA: General  PRE-OP ANTIBIOTIC: Ancef 2g    COMPLICATIONS: None. ESTIMATED BLOOD LOSS: 300 mL. SPECIMENS REMOVED: None. COMPONENTS IMPLANTED:   Implant Name Type Inv. Item Serial No.  Lot No. LRB No. Used Action   PLUG ACET APCL H ELIM POS STP --  - SNA Joint Component PLUG ACET APCL H ELIM POS STP --  NA [facility] [id number] Left 1 Implanted   CUP ACET SECTOR GRIPTION 50MM -- TI - SNA Joint Component CUP ACET SECTOR GRIPTION 50MM -- TI NA [facility] [id number] Left 1 Implanted   SCR BNE ACET CANC PINN 6.5X35 -- SS - SNA Joint Component SCR BNE ACET CANC PINN 6.5X35 -- SS NA [facility] [id number] Left 1 Implanted   SCR ACET CANC PINN 6.5X15MM SS --  - SNA Joint Component SCR ACET CANC PINN 6.5X15MM SS --  NA [facility] [id number] Left 1 Implanted   LINER ACET PINN NEUT 32X50 -- ALTRX - SNA Joint Component LINER ACET PINN NEUT 32X50 -- ALTRX NA [facility] [employer] [id number] Left 1 Implanted   STEM FEM CORAIL STD COL SZ 10 --  - SNA Joint Component STEM FEM CORAIL STD COL SZ 10 --  NA [facility] [id number] Left 1 Implanted   HEAD FEM CER 32MM +1MM NK -- DELTA - SNA Joint Component HEAD FEM CER 32MM +1MM NK -- DELTA NA [facility] [employer] [id number] Left 1 Implanted       INDICATIONS: The patient is an 80 yrs female with progressive debilitating left hip and groin pain due to severe osteoarthritis. Symptoms have progressed despite comprehensive conservative treatment and he presents for left total hip replacement.  Risks, benefits, alternatives of the procedure were reviewed in detail and he desires to proceed. The patient understands the increased risk for perioperative medical complications due to medical comorbidities. DESCRIPTION OF PROCEDURE: Anesthetic was initiated. Preoperative dose of IV  antibiotic was given. Cartwright catheter was placed. The left side was confirmed as the operative side, prepped and draped in the usual sterile fashion. Skin was covered with Ioban occlusive dressing. Direct anterior exposure was made to the patient's hip through the sartorius tensor interval. Anterior hip vasculature was cauterized. Retractors were taken out to observe for bleeding and there was none. The capsule was identified, opened and T'd distally. The femoral neck was osteotomized. Femoral head was removed from the acetabulum, which was exposed and soft tissues were removed. The acetabulum was progressively  reamed to 49 mm and a 50 mm trial shell was impacted with good press-fit. This was removed and a 50 mm Depuy shell was impacted in the acetabulum in 40 degrees of abduction in an anatomic-type anteversion. Bone spurs were removed and 6.5 screws x2 were placed. The polyethylene liner was placed. Femur was positioned and elevated from the wound. The medullary canal was entered. Broached to a size 10. Calcar planed and then trialed. A 32 mm , +1 hip ball was the most appropriate for leg length and tension with a standard offset stem. The hip was dislocated. The anterior greater trochanter was trimmed down to enhance flexion, rotation and stability. The trial was removed and the real stem was impacted. The real hip ball was placed. The hip was reduced. After copious irrigation, the capsule was closed with #2 Vicryl sutures. I irrigated the skin, subcutaneous and deep wound. I closed the fascia of the tensor fascia tevin with #2 Vicryl sutures. Skin and subcutaneous were irrigated. Soft tissues were infiltrated with local anesthetic.  Skin and subcutaneous were closed in a standard fashion. Sterile dressing was applied. There were no complications. No specimen was sent. The procedure was a LEFT TOTAL HIP REPLACEMENT using a Depuy total hip construct. Jose Angel Babin was of vital assistance throughout the duration of the procedure. The patient was transferred to the recovery room in stable condition.     Lamar Diaz MD

## 2018-04-17 NOTE — ANESTHESIA PREPROCEDURE EVALUATION
Anesthetic History   No history of anesthetic complications            Review of Systems / Medical History  Patient summary reviewed, nursing notes reviewed and pertinent labs reviewed    Pulmonary  Within defined limits                 Neuro/Psych       CVA       Cardiovascular    Hypertension          CAD and cardiac stents         GI/Hepatic/Renal  Within defined limits              Endo/Other        Arthritis and cancer     Other Findings            Physical Exam    Airway  Mallampati: II  TM Distance: > 6 cm  Neck ROM: normal range of motion   Mouth opening: Normal     Cardiovascular  Regular rate and rhythm,  S1 and S2 normal,  no murmur, click, rub, or gallop             Dental  No notable dental hx       Pulmonary  Breath sounds clear to auscultation               Abdominal  GI exam deferred       Other Findings            Anesthetic Plan    ASA: 3  Anesthesia type: general            Anesthetic plan and risks discussed with: Patient

## 2018-04-17 NOTE — PERIOP NOTES
Patient: Michell Barakat MRN: [id number]  SSN: [id number]   YOB: 1933  Age: 80 y.o. Sex: female     Patient is status post Procedure(s):  LEFT TOTAL HIP ARTHROPLASTY (LATEX FREE ROOM). Surgeon(s) and Role:     * Lidya Jaime MD - Primary    Local/Dose/Irrigation:  30ml bupivicaine 0.5% plain,  exparel 266mg, 10ml of normal saline injected to left hip                    Peripheral IV 04/17/18 Left Arm (Active)            Airway - Endotracheal Tube 04/17/18 Oral (Active)                   Dressing/Packing:  Wound Hip Left; Anterior-DRESSING TYPE: Silver products; Sutures; Topical skin adhesive/glue (aquacell dressing) (04/17/18 1007)  Splint/Cast:  ]    Other:

## 2018-04-17 NOTE — PROGRESS NOTES
Primary Nurse Obey Blas RN and Matilde Farley RN performed a dual skin assessment on this patient No impairment noted  Esau score is 21

## 2018-04-17 NOTE — PROGRESS NOTES
Problem: Mobility Impaired (Adult and Pediatric)  Goal: *Acute Goals and Plan of Care (Insert Text)  Physical Therapy Goals  Initiated 4/17/2018    1. Patient will move from supine to sit and sit to supine , scoot up and down and roll side to side in bed with independence within 4 days. 2. Patient will perform sit to stand with independence within 4 days. 3. Patient will ambulate with independence for 100 feet with the least restrictive device within 4 days. 4. Patient will ascend/descend 4 stairs with 2 handrail(s) with supervision/set-up within 4 days. 5. Patient will verbalize and demonstrate understanding of anterior THR precautions per protocol within 4 days. 6. Patient will perform anterior THR home exercise program per protocol with independence within 4 days. physical Therapy EVALUATION  Patient: Zenon Berg (33 y.o. female)  Date: 4/17/2018  Primary Diagnosis: LEFT HIP M87.052  Avascular necrosis of hip, left (HCC)  Procedure(s) (LRB):  LEFT TOTAL HIP ARTHROPLASTY (LATEX FREE ROOM) (Left) Day of Surgery   Precautions:   WBAT    ASSESSMENT :  Based on the objective data described below, the patient presents with impaired mobility and gait following an anterior LTHR. Patient is cooperative and motivated. She lives alone and family members state the plan is for her to go to rehab of some sort due to her living alone. Today she came to sitting EOB with CGA of 2. Sat for a few minutes and did some ankle pumps and then stood and side stepped to the [facility]. Stood for another minute and was then ready to sit down. Returned to supine with CGA of 2 and repositioned in the bed. Ice to the left hip. Patient will benefit from skilled intervention to address the above impairments.   Patients rehabilitation potential is considered to be Good  Factors which may influence rehabilitation potential include:   [x]         None noted  []         Mental ability/status  []         Medical condition  [] Home/family situation and support systems  []         Safety awareness  []         Pain tolerance/management  []         Other:      PLAN :  Recommendations and Planned Interventions:  [x]           Bed Mobility Training             []    Neuromuscular Re-Education  [x]           Transfer Training                   []    Orthotic/Prosthetic Training  [x]           Gait Training                         []    Modalities  [x]           Therapeutic Exercises           []    Edema Management/Control  []           Therapeutic Activities            [x]    Patient and Family Training/Education  []           Other (comment):    Frequency/Duration: Patient will be followed by physical therapy  twice daily to address goals. [facility] They state the MD is aware she will need rehab. Further Equipment Recommendations for Discharge: none     SUBJECTIVE:   Patient stated I want to see if this is working any better than before. I have been in therapy for my back but it was my hip.     OBJECTIVE DATA SUMMARY:   HISTORY:    Past Medical History:   Diagnosis Date    Breast cancer ([facility] [address] 1997    lumpectomy, RADIATION    Cancer ([facility] [address] 2002    dcis left breast 2002    Cardiomyopathy [facility])     Coronary artery disease involving native coronary artery of native heart without angina pectoris 12/16/2016    multiple stents    CVA (cerebral vascular accident) (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 Lives alone. Active.   Personal factors and/or comorbidities impacting plan of care:     Home Situation  Home Environment: Private residence  # Steps to Enter: 1  One/Two Story Residence: Two story, live on 1st floor  Living Alone: Yes  Support Systems: Child(garcia), Family member(s)  Patient Expects to be Discharged to[de-identified] Rehabilitation facility (per family)  Current DME Used/Available at Home: Brannon Bird, rollator, Grab bars, Cane, straight, Shower chair  Tub or Shower Type: Shower    EXAMINATION/PRESENTATION/DECISION MAKING:   Critical Behavior:  Neurologic State: Alert, Appropriate for age  Orientation Level: Appropriate for age, Oriented X4  Cognition: Appropriate decision making, Appropriate for age attention/concentration, Appropriate safety awareness, Follows commands     Hearing: Auditory  Auditory Impairment: None  Skin:  Per nursing. Edema: per nursing. Range Of Motion:  AROM: Within functional limits           PROM: Within functional limits           Strength:    Strength: Within functional limits                    Tone & Sensation:   Tone: Normal              Sensation: Intact               Coordination:  Coordination: Within functional limits  Vision:      Functional Mobility:  Bed Mobility:  Rolling: Assist x2; Additional time;Contact guard assistance  Supine to Sit: Contact guard assistance; Additional time;Assist x2  Sit to Supine: Contact guard assistance;Assist x2; Additional time  Scooting: Assist x2;Contact guard assistance; Additional time  Transfers:  Sit to Stand: Assist x2; Additional time;Contact guard assistance                          Balance:   Sitting: Intact  Standing: Impaired  Standing - Static: Constant support;Good  Standing - Dynamic : Fair  Ambulation/Gait Training:  Distance (ft): 3 Feet (ft)  Assistive Device: Gait belt;Walker, rolling  Ambulation - Level of Assistance: Assist x2;Contact guard assistance; Additional time     Gait Description (WDL): Exceptions to WDL  Gait Abnormalities: Decreased step clearance Left Side Weight Bearing: As tolerated  Base of Support: Shift to right     Speed/Juliana: Pace decreased (<100 feet/min)  Step Length: Right shortened;Left shortened                            Therapeutic Exercises: Ankle pumps in sitting and supine. Functional Measure:  Barthel Index:    Bathin  Bladder: 0  Bowels: 10  Groomin  Dressin  Feeding: 10  Mobility: 0  Stairs: 0  Toilet Use: 0  Transfer (Bed to Chair and Back): 0  Total: 30       Barthel and G-code impairment scale:  Percentage of impairment CH  0% CI  1-19% CJ  20-39% CK  40-59% CL  60-79% CM  80-99% CN  100%   Barthel Score 0-100 100 99-80 79-60 59-40 20-39 1-19   0   Barthel Score 0-20 20 17-19 13-16 9-12 5-8 1-4 0      The Barthel ADL Index: Guidelines  1. The index should be used as a record of what a patient does, not as a record of what a patient could do. 2. The main aim is to establish degree of independence from any help, physical or verbal, however minor and for whatever reason. 3. The need for supervision renders the patient not independent. 4. A patient's performance should be established using the best available evidence. Asking the patient, friends/relatives and nurses are the usual sources, but direct observation and common sense are also important. However direct testing is not needed. 5. Usually the patient's performance over the preceding 24-48 hours is important, but occasionally longer periods will be relevant. 6. Middle categories imply that the patient supplies over 50 per cent of the effort. 7. Use of aids to be independent is allowed. Dixie Carter., Barthel, D.W. (4316). Functional evaluation: the Barthel Index. [address] [facility] (14)2. JOSELO Mcpherson, Omero Cantu., Lydia Alicea., Jordana, [address] (). Measuring the change indisability after inpatient rehabilitation; comparison of the responsiveness of the Barthel Index and Functional Stephenson Measure.  Journal of Neurology, Neurosurgery, and Psychiatry, 66(4), 2-710. LES Salazar, OSCAR Greer, & Gregor Culp M.A. (2004.) Assessment of post-stroke quality of life in cost-effectiveness studies: The usefulness of the Barthel Index and the EuroQoL-5D. Quality of Life Research, 13, 108-11       G codes: In compliance with CMSs Claims Based Outcome Reporting, the following G-code set was chosen for this patient based on their primary functional limitation being treated: The outcome measure chosen to determine the severity of the functional limitation was the Barthel with a score of 30/100 which was correlated with the impairment scale. ? Mobility - Walking and Moving Around:     - CURRENT STATUS: CL - 60%-79% impaired, limited or restricted    - GOAL STATUS: CK - 40%-59% impaired, limited or restricted    - D/C STATUS:  ---------------To be determined---------------      Pain:  Pain Scale 1: Numeric (0 - 10)  Pain Intensity 1: 0  Pain Location 1: Hip  Pain Orientation 1: Anterior; Left  Pain Description 1: Aching; Intermittent  Pain Intervention(s) 1: Medication (see MAR); Cold pack  Activity Tolerance:   Good. Please refer to the flowsheet for vital signs taken during this treatment. After treatment: ice to hip. []         Patient left in no apparent distress sitting up in chair  [x]         Patient left in no apparent distress in bed  [x]         Call bell left within reach  [x]         Nursing notified  [x]         Caregiver present  []         Bed alarm activated    COMMUNICATION/EDUCATION:   The patients plan of care was discussed with: Registered Nurse and Rehabilitation Attendant. [x]         Fall prevention education was provided and the patient/caregiver indicated understanding. [x]         Patient/family have participated as able in goal setting and plan of care. [x]         Patient/family agree to work toward stated goals and plan of care.   []         Patient understands intent and goals of therapy, but is neutral about his/her participation. []         Patient is unable to participate in goal setting and plan of care.     Thank you for this referral.  Renata Molina, PT   Time Calculation: 26 mins

## 2018-04-17 NOTE — IP AVS SNAPSHOT
[address] [facility] [place] [facility] [place] [address] 
[phone] Patient: Dimitri Riedel MRN: [id number] About your hospitalization You were admitted on:  April 17, 2018 You last received care in the:  [address] [place] [address] You were discharged on:  April 20, 2018 Why you were hospitalized Your primary diagnosis was:  Not on File Follow-up Information Follow up With Details Comments Contact Info Rebecca Daxa, [facility] 13 
[phone] Discharge Orders None A check ahmet indicates which time of day the medication should be taken. My Medications START taking these medications Instructions Each Dose to Equal  
 Morning Noon Evening Bedtime  
 oxyCODONE IR 5 mg immediate release tablet Commonly known as:  Alfonzo Melena Your last dose was: Your next dose is: Take 0.5-1 Tabs by mouth every three (3) hours as needed. Max Daily Amount: 40 mg.  
 2.5-5 mg CONTINUE taking these medications Instructions Each Dose to Equal  
 Morning Noon Evening Bedtime  
 allopurinol 300 mg tablet Commonly known as:  Diane Fulling Your last dose was: Your next dose is: Take 1/2 tablet by mouth daily. For tophaceous gout  
     
   
   
   
  
 aspirin delayed-release 81 mg tablet Your last dose was: Your next dose is: Take 81 mg by mouth daily. 81 mg  
    
   
   
   
  
 atorvastatin 20 mg tablet Commonly known as:  LIPITOR Your last dose was: Your next dose is: Take 20 mg by mouth nightly. 20 mg  
    
   
   
   
  
 bumetanide 2 mg tablet Commonly known as:  Jerzy Norwood Your last dose was: Your next dose is: Take 2 mg by mouth daily. 2 mg  
    
   
   
   
  
 gabapentin 300 mg capsule Commonly known as:  NEURONTIN  
   
 Your last dose was: Your next dose is: TAKE 1 CAPSULE BY MOUTH THREE TIMES A DAY  
     
   
   
   
  
 lisinopril 10 mg tablet Commonly known as:  [place] Human Your last dose was: Your next dose is: Take 10 mg by mouth. 10 mg  
    
   
   
   
  
 metoprolol succinate 50 mg XL tablet Commonly known as:  TOPROL-XL Your last dose was: Your next dose is: TAKE 1&1/2 TABLETS BY MOUTH EVERY HS  
     
   
   
   
  
 nitroglycerin 0.4 mg SL tablet Commonly known as:  NITROSTAT Your last dose was: Your next dose is: PLAVIX 75 mg Tab Generic drug:  clopidogrel Your last dose was: Your next dose is: Take 75 mg by mouth daily. 75 mg  
    
   
   
   
  
 traMADol 50 mg tablet Commonly known as:  ULTRAM  
   
Your last dose was: Your next dose is: Take 1 Tab by mouth every six (6) hours as needed for Pain. Max Daily Amount: 200 mg.  
 50 mg  
    
   
   
   
  
 triamcinolone acetonide 0.025 % topical cream  
Commonly known as:  KENALOG Your last dose was: Your next dose is:    
   
   
 Apply  to affected area. VITAMIN D3 1,000 unit Cap Generic drug:  cholecalciferol Your last dose was: Your next dose is: Take 1,000 Units by mouth daily. 1000 Units Where to Get Your Medications Information on where to get these meds will be given to you by the nurse or doctor. ! Ask your nurse or doctor about these medications  
  oxyCODONE IR 5 mg immediate release tablet Opioid Education  Prescription Opioids: What You Need to Know: 
 
Prescription opioids can be used to help relieve moderate-to-severe pain and are often prescribed following a surgery or injury, or for certain health conditions. These medications can be an important part of treatment but also come with serious risks. Opioids are strong pain medicines. Examples include hydrocodone, oxycodone, fentanyl, and morphine. Heroin is an example of an illegal opioid. It is important to work with your health care provider to make sure you are getting the safest, most effective care. WHAT ARE THE RISKS AND SIDE EFFECTS OF OPIOID USE? Prescription opioids carry serious risks of addiction and overdose, especially with prolonged use. An opioid overdose, often marked by slow breathing, can cause sudden death. The use of prescription opioids can have a number of side effects as well, even when taken as directed. · Tolerance-meaning you might need to take more of a medication for the same pain relief · Physical dependence-meaning you have symptoms of withdrawal when the medication is stopped. Withdrawal symptoms can include nausea, sweating, chills, diarrhea, stomach cramps, and muscle aches. Withdrawal can last up to several weeks, depending on which drug you took and how long you took it. · Increased sensitivity to pain · Constipation · Nausea, vomiting, and dry mouth · Sleepiness and dizziness · Confusion · Depression · Low levels of testosterone that can result in lower sex drive, energy, and strength · Itching and sweating RISKS ARE GREATER WITH:      
· History of drug misuse, substance use disorder, or overdose · Mental health conditions (such as depression or anxiety) · Sleep apnea · Older age (72 years or older) · Pregnancy Avoid alcohol while taking prescription opioids. Also, unless specifically advised by your health care provider, medications to avoid include: · Benzodiazepines (such as Xanax or Valium) · Muscle relaxants (such as Soma or Flexeril) · Hypnotics (such as Ambien or Lunesta) · Other prescription opioids KNOW YOUR OPTIONS Talk to your health care provider about ways to manage your pain that don't involve prescription opioids. Some of these options may actually work better and have fewer risks and side effects. Options may include: 
· Pain relievers such as acetaminophen, ibuprofen, and naproxen · Some medications that are also used for depression or seizures · Physical therapy and exercise · Counseling to help patients learn how to cope better with triggers of pain and stress. · Application of heat or cold compress · Massage therapy · Relaxation techniques Be Informed Make sure you know the name of your medication, how much and how often to take it, and its potential risks & side effects. IF YOU ARE PRESCRIBED OPIOIDS FOR PAIN: 
· Never take opioids in greater amounts or more often than prescribed. Remember the goal is not to be pain-free but to manage your pain at a tolerable level. · Follow up with your primary care provider to: · Work together to create a plan on how to manage your pain. · Talk about ways to help manage your pain that don't involve prescription opioids. · Talk about any and all concerns and side effects. · Help prevent misuse and abuse. · Never sell or share prescription opioids · Help prevent misuse and abuse. · Store prescription opioids in a secure place and out of reach of others (this may include visitors, children, friends, and family). · Safely dispose of unused/unwanted prescription opioids: Find your community drug take-back program or your pharmacy mail-back program, or flush them down the toilet, following guidance from the Food and Drug Administration (www.fda.gov/Drugs/ResourcesForYou). · Visit www.cdc.gov/drugoverdose to learn about the risks of opioid abuse and overdose. · If you believe you may be struggling with addiction, tell your health care provider and ask for guidance or call [facility] at 1-280-562-KVYX. Discharge Instructions Discharge Instructions Anterior Approach Hip Replacement Dr. Fay Tam Patient Mitchel Newfield Date of procedure:4/17/2018 Procedure:Procedure(s): LEFT TOTAL HIP ARTHROPLASTY (LATEX FREE ROOM) Surgeon:Surgeon(s) and Role: Sneha Bloom MD - Primary PCP: Lore Patrick MD 
Date of discharge: No discharge date for patient encounter. Follow up appointments ? Follow up with Dr. Fay Tam in 3 weeks. Call [phone] to make an appointment. ? If home health has been arranged for you the agency will contact you to arrange dates/times for visits. Please call them if you do not hear from them within 24 hours after you are discharged When to call your Orthopaedic Surgeon: Call [phone]. If you need to reach us after 5pm or on a weekend; call [phone] and the on call physician will be contacted ? Increased hip pain, unrelieved pain or if you have difficulty or are unable to walk ? Signs of infection-if your incision is red; continues to have drainage; drainage has a foul odor or if you have a persistent fever over 101 degrees ? Signs of a blood clot in your leg-calf pain, tenderness, redness, swelling of lower leg When to call your Primary Care Physician: 
? Concerns about medical conditions such as diabetes, high blood pressure, asthma, congestive heart failure ? Call if blood sugars are elevated, persistent headache or dizziness, coughing or congestion, constipation or diarrhea, burning with urination, abnormal heart rate When to call [id number] go to the nearest emergency room ? Acute onset of chest pain, shortness of breath, difficulty breathing Activity ? Weight bearing as tolerated with walker or crutches. Refer to pages 23-33 of your handbook for instructions and pictures ?  Complete your Home Exercise Program daily as instructed by your therapist.  Refer to pages 36-42 of your handbook for instructions and pictures ? Get up every one hour and walk (except at night when sleeping) ? AVOID sudden and extreme movement of your hip (surgical leg) ? Do not drive or operate heavy machinery Incision Care ? The Aquacel (brown, waterproof) surgical dressing is to remain on your hip for 7 days. On the 7th day have someone gently peel the dressing off by carefully lifting the edge and stretching it slightly to break the adhesive seal and leave incision open to air. You may take a shower with the Aquacel dressing in place. ? You do not have staples in your hip incision; if present they will be removed by the Home Health staff in 10-14 days and steri-strips will be applied. Leave the steri-strips on until they fall off Preventing blood clot ? Take Aspirin as prescribed. Plavix Pain management ? Take pain medication as prescribed; decrease the amount you use as your pain lessens ? Avoid alcoholic beverages while taking pain medication ? Do not take any over-the-counter medication except for Tylenol (acetaminophen) ? Please be aware that many medications contain Tylenol. We do not want you to over medicate so please read the information below as a guide. Do not take more than 3 Grams of Tylenol in a 24 hour period. (There are 1000 milligrams in one Gram) 
o 325 mg of Tylenol per tablet (do not take more than 9 tablets in 24 hours) 
o 500 mg of Tylenol per tablet (do not take more than 6 tablets in 24 hours) ? You may place an ice bag on your hip for 15-20 minutes after exercising and as needed throughout the day and night Diet Resume usual diet; drink plenty of fluids; eat foods high in fiber You may want to take a stool softener (such as Senokot-S or Colace) to prevent constipation while you are taking pain medication. If constipation occurs, take a laxative (such as Dulcolax tablets, Milk of Magnesia, or a suppository) Patient meets criteria for BUNDLED PAYMENT  
for Care Improvement Initiative Criteria Contact Information for Orthopedic Nurse Navigator:     
JOHN Matthews, RN-BC 
O:[phone] F:[phone] V:[phone] Discharge Instructions Anterior Approach Hip Replacement Dr. Marjorie Soler Patient Janki Reyes Date of procedure:4/17/2018 Procedure:Procedure(s): LEFT TOTAL HIP ARTHROPLASTY (LATEX FREE ROOM) Surgeon:Surgeon(s) and Role: Augusto Rodriguez MD - Primary PCP: Guille Santana MD 
Date of discharge: No discharge date for patient encounter. Follow up appointments ? Follow up with Dr. Marjorie Soler in 3 weeks. Call [phone] to make an appointment. ? If home health has been arranged for you the agency will contact you to arrange dates/times for visits. Please call them if you do not hear from them within 24 hours after you are discharged When to call your Orthopaedic Surgeon: Call [phone]. If you need to reach us after 5pm or on a weekend; call [phone] and the on call physician will be contacted ? Increased hip pain, unrelieved pain or if you have difficulty or are unable to walk ? Signs of infection-if your incision is red; continues to have drainage; drainage has a foul odor or if you have a persistent fever over 101 degrees ? Signs of a blood clot in your leg-calf pain, tenderness, redness, swelling of lower leg When to call your Primary Care Physician: 
? Concerns about medical conditions such as diabetes, high blood pressure, asthma, congestive heart failure ? Call if blood sugars are elevated, persistent headache or dizziness, coughing or congestion, constipation or diarrhea, burning with urination, abnormal heart rate When to call [id number] go to the nearest emergency room ? Acute onset of chest pain, shortness of breath, difficulty breathing Activity ? Weight bearing as tolerated with walker or crutches.  Refer to pages 23-33 of your handbook for instructions and pictures ? Complete your Home Exercise Program daily as instructed by your therapist.  Refer to pages 36-42 of your handbook for instructions and pictures ? Get up every one hour and walk (except at night when sleeping) ? AVOID sudden and extreme movement of your hip (surgical leg) ? Do not drive or operate heavy machinery Incision Care ? The Aquacel (brown, waterproof) surgical dressing is to remain on your hip for 7 days. On the 7th day have someone gently peel the dressing off by carefully lifting the edge and stretching it slightly to break the adhesive seal and leave incision open to air. You may take a shower with the Aquacel dressing in place. ? You do not have staples in your hip incision; if present they will be removed by the Home Health staff in 10-14 days and steri-strips will be applied. Leave the steri-strips on until they fall off Preventing blood clot ? Take Aspirin and Plavix as prescribed Pain management ? Take pain medication as prescribed; decrease the amount you use as your pain lessens ? Avoid alcoholic beverages while taking pain medication ? Do not take any over-the-counter medication except for Tylenol (acetaminophen) ? Please be aware that many medications contain Tylenol. We do not want you to over medicate so please read the information below as a guide. Do not take more than 3 Grams of Tylenol in a 24 hour period. (There are 1000 milligrams in one Gram) 
o 325 mg of Tylenol per tablet (do not take more than 9 tablets in 24 hours) 
o 500 mg of Tylenol per tablet (do not take more than 6 tablets in 24 hours) ? You may place an ice bag on your hip for 15-20 minutes after exercising and as needed throughout the day and night Diet Resume usual diet; drink plenty of fluids; eat foods high in fiber You may want to take a stool softener (such as Senokot-S or Colace) to prevent constipation while you are taking pain medication. If constipation occurs, take a laxative (such as Dulcolax tablets, Milk of Magnesia, or a suppository) Hark Announcement We are excited to announce that we are making your provider's discharge notes available to you in Hark. You will see these notes when they are completed and signed by the physician that discharged you from your recent hospital stay. If you have any questions or concerns about any information you see in Hark, please call the Health Information Department where you were seen or reach out to your Primary Care Provider for more information about your plan of care. Introducing [facility]! Elsa Gonzalez introduces Hark patient portal. Now you can access parts of your medical record, email your doctor's office, and request medication refills online. 1. In your internet browser, go to [email or web address] 2. Click on the First Time User? Click Here link in the Sign In box. You will see the New Member Sign Up page. 3. Enter your Hark Access Code exactly as it appears below. You will not need to use this code after youve completed the sign-up process. If you do not sign up before the expiration date, you must request a new code. · Hark Access Code: [email or web address] Expires: 6/26/2018 12:07 PM 
 
4. Enter the last four digits of your Social Security Number (xxxx) and Date of Birth (mm/dd/yyyy) as indicated and click Submit. You will be taken to the next sign-up page. 5. Create a Ultromext ID. This will be your Hark login ID and cannot be changed, so think of one that is secure and easy to remember. 6. Create a Hark password. You can change your password at any time. 7. Enter your Password Reset Question and Answer. This can be used at a later time if you forget your password. 8. Enter your e-mail address.  You will receive e-mail notification when new information is available in [employer]. 9. Click Sign Up. You can now view and download portions of your medical record. 10. Click the Download Summary menu link to download a portable copy of your medical information. If you have questions, please visit the Frequently Asked Questions section of the [employer] website. Remember, [employer] is NOT to be used for urgent needs. For medical emergencies, dial 911. Now available from your iPhone and Android! Introducing Branden Han As a [facility] patient, I wanted to make you aware of our electronic visit tool called Branden Han. [employer] 24/7 allows you to connect within minutes with a medical provider 24 hours a day, seven days a week via a mobile device or tablet or logging into a secure website from your computer. You can access Branden Han from anywhere in the [place]. A virtual visit might be right for you when you have a simple condition and feel like you just dont want to get out of bed, or cant get away from work for an appointment, when your regular [facility] provider is not available (evenings, weekends or holidays), or when youre out of town and need minor care. Electronic visits cost only $49 and if the [employer] 24/7 provider determines a prescription is needed to treat your condition, one can be electronically transmitted to a nearby pharmacy*. Please take a moment to enroll today if you have not already done so. The enrollment process is free and takes just a few minutes. To enroll, please download the [employer] 24/7 sandra to your tablet or phone, or visit [email or web address] to enroll on your computer. And, as an [address] [place] [address] patient with a [email or web address] account, the results of your visits will be scanned into your electronic medical record and your primary care provider will be able to view the scanned results. We urge you to continue to see your regular [facility] provider for your ongoing medical care. And while your primary care provider may not be the one available when you seek a Branden Marlowfin virtual visit, the peace of mind you get from getting a real diagnosis real time can be priceless. For more information on Branden Marlowfin, view our Frequently Asked Questions (FAQs) at [email or web address]. Sincerely, 
 
Pillo Frederick MD 
Chief Medical Officer [facility] Jerica Edmundo *:  certain medications cannot be prescribed via Branden Daomiguelfin Providers Seen During Your Hospitalization Provider Specialty Primary office phone Moe Quigley MD Orthopedic Surgery [phone] Your Primary Care Physician (PCP) Primary Care Physician Office Phone Office Fax Ty Huber [phone] You are allergic to the following Allergen Reactions Amoxicillin-Pot Clavulanate Nausea and Vomiting Cephalexin Nausea and Vomiting Doxycycline Nausea and Vomiting Neomycin-Polymyxin-Hc Nausea and Vomiting Rocephin (Ceftriaxone) Nausea and Vomiting  
    
 Sulfamethoxazole-Trimethoprim Nausea and Vomiting Recent Documentation Height Weight BMI OB Status Smoking Status 1.524 m 67.1 kg 28.9 kg/m2 Hysterectomy Never Smoker Emergency Contacts Name Discharge Info Relation Home Work Mobile [address] CAREGIVER [3] Child [2] [phone] Patient Belongings The following personal items are in your possession at time of discharge: 
  Dental Appliances: Other (comment) (caps all teeth)  Visual Aid: None             Clothing: Other (comment) (clothing)    Other Valuables: [address] [facility] [place] Please provide this summary of care documentation to your next provider.  
  
  
 
  
Signatures-by signing, you are acknowledging that this After Visit Summary has been reviewed with you and you have received a copy. Patient Signature:  ____________________________________________________________ Date:  ____________________________________________________________  
  
Sunil Cesar Provider Signature:  ____________________________________________________________ Date:  ____________________________________________________________

## 2018-04-18 LAB
ANION GAP SERPL CALC-SCNC: 8 MMOL/L (ref 5–15)
BUN SERPL-MCNC: 14 MG/DL (ref 6–20)
BUN/CREAT SERPL: 12 (ref 12–20)
CALCIUM SERPL-MCNC: 8 MG/DL (ref 8.5–10.1)
CHLORIDE SERPL-SCNC: 108 MMOL/L (ref 97–108)
CO2 SERPL-SCNC: 24 MMOL/L (ref 21–32)
CREAT SERPL-MCNC: 1.16 MG/DL (ref 0.55–1.02)
GLUCOSE SERPL-MCNC: 129 MG/DL (ref 65–100)
HGB BLD-MCNC: 8.2 G/DL (ref 11.5–16)
POTASSIUM SERPL-SCNC: 4.2 MMOL/L (ref 3.5–5.1)
SODIUM SERPL-SCNC: 140 MMOL/L (ref 136–145)

## 2018-04-18 PROCEDURE — 80048 BASIC METABOLIC PNL TOTAL CA: CPT | Performed by: PHYSICIAN ASSISTANT

## 2018-04-18 PROCEDURE — 97535 SELF CARE MNGMENT TRAINING: CPT | Performed by: OCCUPATIONAL THERAPIST

## 2018-04-18 PROCEDURE — 74011250637 HC RX REV CODE- 250/637: Performed by: ORTHOPAEDIC SURGERY

## 2018-04-18 PROCEDURE — 97530 THERAPEUTIC ACTIVITIES: CPT | Performed by: PHYSICAL THERAPIST

## 2018-04-18 PROCEDURE — 74011250637 HC RX REV CODE- 250/637: Performed by: PHYSICIAN ASSISTANT

## 2018-04-18 PROCEDURE — 97165 OT EVAL LOW COMPLEX 30 MIN: CPT | Performed by: OCCUPATIONAL THERAPIST

## 2018-04-18 PROCEDURE — 65270000029 HC RM PRIVATE

## 2018-04-18 PROCEDURE — 36415 COLL VENOUS BLD VENIPUNCTURE: CPT | Performed by: PHYSICIAN ASSISTANT

## 2018-04-18 PROCEDURE — 85018 HEMOGLOBIN: CPT | Performed by: PHYSICIAN ASSISTANT

## 2018-04-18 PROCEDURE — 74011250636 HC RX REV CODE- 250/636: Performed by: PHYSICIAN ASSISTANT

## 2018-04-18 PROCEDURE — 97116 GAIT TRAINING THERAPY: CPT | Performed by: PHYSICAL THERAPIST

## 2018-04-18 RX ADMIN — POLYETHYLENE GLYCOL 3350 17 G: 17 POWDER, FOR SOLUTION ORAL at 08:59

## 2018-04-18 RX ADMIN — OXYCODONE HYDROCHLORIDE 10 MG: 5 TABLET ORAL at 04:38

## 2018-04-18 RX ADMIN — BENZOCAINE AND MENTHOL 1 LOZENGE: 15; 3.6 LOZENGE ORAL at 05:12

## 2018-04-18 RX ADMIN — ACETAMINOPHEN 1000 MG: 500 TABLET, FILM COATED ORAL at 12:04

## 2018-04-18 RX ADMIN — ALLOPURINOL 150 MG: 100 TABLET ORAL at 08:59

## 2018-04-18 RX ADMIN — ASPIRIN 81 MG: 81 TABLET, COATED ORAL at 08:59

## 2018-04-18 RX ADMIN — STANDARDIZED SENNA CONCENTRATE AND DOCUSATE SODIUM 1 TABLET: 8.6; 5 TABLET, FILM COATED ORAL at 17:26

## 2018-04-18 RX ADMIN — ACETAMINOPHEN 1000 MG: 500 TABLET, FILM COATED ORAL at 00:45

## 2018-04-18 RX ADMIN — OXYCODONE HYDROCHLORIDE 10 MG: 5 TABLET ORAL at 00:45

## 2018-04-18 RX ADMIN — CELECOXIB 200 MG: 200 CAPSULE ORAL at 17:26

## 2018-04-18 RX ADMIN — Medication 10 ML: at 22:17

## 2018-04-18 RX ADMIN — BENZOCAINE AND MENTHOL 1 LOZENGE: 15; 3.6 LOZENGE ORAL at 12:12

## 2018-04-18 RX ADMIN — STANDARDIZED SENNA CONCENTRATE AND DOCUSATE SODIUM 1 TABLET: 8.6; 5 TABLET, FILM COATED ORAL at 08:59

## 2018-04-18 RX ADMIN — GABAPENTIN 300 MG: 300 CAPSULE ORAL at 17:27

## 2018-04-18 RX ADMIN — Medication 10 ML: at 13:10

## 2018-04-18 RX ADMIN — SODIUM CHLORIDE 500 ML: 900 INJECTION, SOLUTION INTRAVENOUS at 08:57

## 2018-04-18 RX ADMIN — BENZOCAINE AND MENTHOL 1 LOZENGE: 15; 3.6 LOZENGE ORAL at 00:44

## 2018-04-18 RX ADMIN — Medication 2 G: at 00:45

## 2018-04-18 RX ADMIN — ACETAMINOPHEN 1000 MG: 500 TABLET, FILM COATED ORAL at 05:12

## 2018-04-18 RX ADMIN — GABAPENTIN 300 MG: 300 CAPSULE ORAL at 08:58

## 2018-04-18 RX ADMIN — CELECOXIB 200 MG: 200 CAPSULE ORAL at 08:59

## 2018-04-18 RX ADMIN — CLOPIDOGREL BISULFATE 75 MG: 75 TABLET ORAL at 08:59

## 2018-04-18 RX ADMIN — GABAPENTIN 300 MG: 300 CAPSULE ORAL at 22:17

## 2018-04-18 NOTE — PROGRESS NOTES
Spiritual Care Partner Volunteer visited patient in Rm 563 on 4/18/18.   Documented by:  Chaplain Kraus MDiv, MS, [facility]  [phone] ([phone])

## 2018-04-18 NOTE — PROGRESS NOTES
Problem: Mobility Impaired (Adult and Pediatric)  Goal: *Acute Goals and Plan of Care (Insert Text)  Physical Therapy Goals  Initiated 4/17/2018    1. Patient will move from supine to sit and sit to supine , scoot up and down and roll side to side in bed with independence within 4 days. 2. Patient will perform sit to stand with independence within 4 days. 3. Patient will ambulate with independence for 100 feet with the least restrictive device within 4 days. 4. Patient will ascend/descend 4 stairs with 2 handrail(s) with supervision/set-up within 4 days. 5. Patient will verbalize and demonstrate understanding of anterior THR precautions per protocol within 4 days. 6. Patient will perform anterior THR home exercise program per protocol with independence within 4 days. physical Therapy TREATMENT  Patient: Yanna Salgado (1 y.o. female)  Date: 4/18/2018  Diagnosis: LEFT HIP M87.052  Avascular necrosis of hip, left (HCC) <principal problem not specified>  Procedure(s) (LRB):  LEFT TOTAL HIP ARTHROPLASTY (LATEX FREE ROOM) (Left) 1 Day Post-Op  Precautions: Fall, WBAT  Chart, physical therapy assessment, plan of care and goals were reviewed. ASSESSMENT:  Patient continues with low but stable BP with activity. Came to sitting with minimal assist of 2. Sat for a few minutes for BP check. Stood with a RW and side stepped to the [facility]. Stood for a total of 5 minutes. She then sat and rested and stood and pivoted to the [facility]. She then stood and used the RW to go 2 feet back to the bed. She was up for a total of 17 minutes. Returned to supine with assist and was repositioned in the bed and then the bed was put in the chair position. Cooperative and motivated. Vitals are below.        Progression toward goals:  [x]      Improving appropriately and progressing toward goals  []      Improving slowly and progressing toward goals  []      Not making progress toward goals and plan of care will be adjusted PLAN:  Patient continues to benefit from skilled intervention to address the above impairments. Continue treatment per established plan of care. Discharge Recommendations:  Skilled Nursing Facility  Further Equipment Recommendations for Discharge:  none     SUBJECTIVE:   Patient stated I feel ok. My hip still hurts.     OBJECTIVE DATA SUMMARY:   Critical Behavior:  Neurologic State: Alert, Appropriate for age  Orientation Level: Oriented X4  Cognition: Appropriate decision making, Appropriate for age attention/concentration, Appropriate safety awareness, Follows commands  Safety/Judgement: Awareness of environment, Driving appropriateness, Fall prevention, Good awareness of safety precautions, Home safety, Insight into deficits  Functional Mobility Training:  Bed Mobility:  Rolling: Modified independent  Supine to Sit: Minimum assistance;Assist x2; Additional time  Sit to Supine: Assist x1;Additional time;Minimum assistance  Scooting: Supervision        Transfers:  Sit to Stand: Minimum assistance;Assist x2; Additional time  Stand to Sit: Assist x1;Additional time;Contact guard assistance                             Balance:  Sitting: Intact  Standing: Intact; With support  Standing - Static: Constant support;Good  Standing - Dynamic : Good  Ambulation/Gait Training:  Distance (ft): 5 Feet (ft)  Assistive Device: Walker, rolling;Gait belt  Ambulation - Level of Assistance: Assist x2;Contact guard assistance        Gait Abnormalities: Decreased step clearance     Left Side Weight Bearing: As tolerated  Base of Support: Shift to right     Speed/Juliana: Pace decreased (<100 feet/min)  Step Length: Right shortened;Left shortened            Vitals:    04/18/18 1238 04/18/18 1242 04/18/18 1246 04/18/18 1357   BP: (!) 87/58 92/60 90/55 (!) 86/53   BP 1 Location: Right arm Right arm Right arm    BP Patient Position: Supine; At rest Sitting Standing    Pulse: 71 77 82 67   Resp:    16   Temp:    97.8 °F (36.6 °C) SpO2:    95%   Weight:       Height:                           Therapeutic Exercises:   SUPINE  EXERCISES   Sets   Reps   Active Active Assist   Passive Self ROM   Comments   Ankle Pumps   []                                        []                                        []                                        []                                           Quad Sets   []                                        []                                        []                                        []                                           Heel Slides   []                                        []                                        []                                        []                                           Hip Abduction   []                                        []                                        []                                        []                                           Glut Sets   []                                        []                                        []                                        []                                              []                                        []                                        []                                        []                                              []                                        []                                        []                                        []                                             STANDING  EXERCISES   Sets   Reps   Active Active Assist   Passive Self ROM   Comments   Heel Raises 1 10 [x]                                        []                                        []                                        []                                           Hip Abduction   []                                        []                                        []                                        []                                              [] []                                        []                                        []                                              []                                        []                                        []                                        []                                             Pain:  Pain Scale 1: Numeric (0 - 10)  Pain Intensity 1: 0  Pain Location 1: Hip  Pain Orientation 1: Left  Pain Description 1: Aching  Pain Intervention(s) 1: Medication (see MAR)  Activity Tolerance:   Low BP  Please refer to the flowsheet for vital signs taken during this treatment.   After treatment: ice to hip  [] Patient left in no apparent distress sitting up in chair  [x] Patient left in no apparent distress in bed  [x] Call bell left within reach  [x] Nursing notified  [] Caregiver present  [] Bed alarm activated    COMMUNICATION/COLLABORATION:   The patients plan of care was discussed with: Occupational Therapist and Registered Nurse    Atilio Wakefield PT   Time Calculation: 40 mins

## 2018-04-18 NOTE — PROGRESS NOTES
Chart reviewed. Patient is post-op day one of a left total hip arthroplasty. Noted therapy is recommending rehab. CM attempted to meet with patient to discuss rehab options. Patient had a friend visiting and requested that CM come back later. CM revisited patient later in the day, patient was working with therapy. CM will follow up.     Estefanía Martinez, WILBERW/CRM

## 2018-04-18 NOTE — PROGRESS NOTES
Problem: Self Care Deficits Care Plan (Adult)  Goal: *Acute Goals and Plan of Care (Insert Text)  Occupational Therapy Goals  Initiated 4/18/2018  1. Patient will perform lower body ADLs with AE supervision/set-up within 4 day(s). 2.  Patient will perform upper body ADLs standing 5 mins without fatigue or LOB with supervision/set-up within 4 day(s). 3.  Patient will perform toilet transfer with supervision/set-up within 4 day(s). 4.  Patient will perform all aspects of toileting with supervision/set-up within 4 day(s). 5.  Patient will participate in upper extremity therapeutic exercise/activities with independence for 10 minutes within 4 day(s). 6.  Patient will utilize energy conservation techniques during functional activities without cues within 4 day(s). Occupational Therapy EVALUATION  Patient: Michell Barakat (37 y.o. female)  Date: 4/18/2018  Primary Diagnosis: LEFT HIP M87.052  Avascular necrosis of hip, left (HCC)  Procedure(s) (LRB):  LEFT TOTAL HIP ARTHROPLASTY (LATEX FREE ROOM) (Left) 1 Day Post-Op   Precautions:  Fall, WBAT    ASSESSMENT :  Based on the objective data described below, the patient presents with low BP, but tolerating OOB activity some, decreased endurance, mobility and safety following L hip sx. She currently requires up to mod A for LE ADLs, min A for toileting and min A for brief functional mobility using RW and BSC in room. Pt lives alone, is active and drives. Recommend rehab at discharge. Patient will benefit from skilled intervention to address the above impairments.   Patients rehabilitation potential is considered to be Good  Factors which may influence rehabilitation potential include:   []                None noted  []                Mental ability/status  [x]                Medical condition  []                Home/family situation and support systems  []                Safety awareness  [x]                Pain tolerance/management  []                Other: PLAN :  Recommendations and Planned Interventions:  [x]                  Self Care Training                  [x]           Therapeutic Activities  [x]                  Functional Mobility Training    []           Cognitive Retraining  [x]                  Therapeutic Exercises           [x]           Endurance Activities  [x]                  Balance Training                   []           Neuromuscular Re-Education  []                  Visual/Perceptual Training     [x]      Home Safety Training  [x]                  Patient Education                 [x]           Family Training/Education  []                  Other (comment):    Frequency/Duration: Patient will be followed by occupational therapy 5 times a week to address goals. Discharge Recommendations: Rehab - Pt requesting SNF   Further Equipment Recommendations for Discharge: TBD     SUBJECTIVE:   Patient stated My hip hurts so bad. 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    Venous insufficiency 5/17/2011     Past Surgical History:   Procedure Laterality Date    HX BREAST LUMPECTOMY      dcis lelf breast    HX COLONOSCOPY      HX CORONARY STENT PLACEMENT  2015    X6    HX HYSTERECTOMY      HX OTHER SURGICAL      ORAL    VASCULAR SURGERY PROCEDURE UNLIST      varicose veins       Prior Level of Function/Environment/Context: Pt lives alone, is active and drives  Home Situation  Home Environment: Private residence  # Steps to Enter: 1  One/Two Story Residence: Two story, live on 1st floor  Living Alone: Yes  Support Systems: Child(garcia), Family member(s)  Patient Expects to be Discharged to[de-identified] Rehabilitation facility (per family)  Current DME Used/Available at Home: Ashleigh Barrazawell, rollator, Grab bars, Cane, straight, Shower chair  Tub or Shower Type: Shower  [x]  Right hand dominant   []  Left hand dominant    EXAMINATION OF PERFORMANCE DEFICITS:  Cognitive/Behavioral Status:  Neurologic State: Alert; Appropriate for age  Orientation Level: Oriented X4  Cognition: Appropriate decision making; Appropriate for age attention/concentration; Appropriate safety awareness; Follows commands  Perception: Appears intact  Perseveration: No perseveration noted  Safety/Judgement: Awareness of environment;Driving appropriateness; Fall prevention;Good awareness of safety precautions; Home safety; Insight into deficits    Hearing: Auditory  Auditory Impairment: None    Vision/Perceptual:    Acuity: Impaired near vision    Corrective Lenses: Reading glasses    Range of Motion:  AROM: Generally decreased, functional  PROM: Generally decreased, functional                      Strength:  Strength: Generally decreased, functional                Coordination:  Coordination: Within functional limits  Fine Motor Skills-Upper: Left Intact; Right Intact    Gross Motor Skills-Upper: Left Intact; Right Intact (RW)    Tone & Sensation:  Tone: Normal  Sensation: Intact                      Balance:  Sitting: Intact  Standing: Intact; With support  Standing - Static: Constant support;Good  Standing - Dynamic : Good    Functional Mobility and Transfers for ADLs:  Bed Mobility:  Rolling: Modified independent  Supine to Sit: Minimum assistance;Assist x2; Additional time  Sit to Supine: Assist x1;Additional time;Minimum assistance  Scooting: Supervision    Transfers:  Sit to Stand: Minimum assistance;Assist x2; Additional time  Stand to Sit: Assist x1;Additional time;Contact guard assistance  Toilet Transfer : Minimum assistance; Additional time;Assist x1 (using RW and [facility])    ADL Assessment:  Feeding: Independent    Oral Facial Hygiene/Grooming: Setup    Bathing: Minimum assistance; Additional time;Assist x1 (A to reach feet)    Upper Body Dressing: Setup    Lower Body Dressing: Moderate assistance; Additional time;Assist x1 (A to reach feet)    Toileting: Minimum assistance; Additional time;Assist x1 (A for clothing)                ADL Intervention and task modifications:  Cognitive Retraining  Safety/Judgement: Awareness of environment;Driving appropriateness; Fall prevention;Good awareness of safety precautions; Home safety; Insight into deficits    Educated patient on energy conservation techniques, strategies to maximize quality of life and decrease stress/anxiety. 1. Deep breathing  2. Educated on pacing and making sure he/she takes short frequent breaks (e.g. In the shower wash the upper body, rest for 1 minute, then wash the lower body, etc)  3. Educated on using cooler water in the shower so as to not get fatigued from the heat  4. Educated on drying off by using a rebekah cloth robe  5. Educated on re-arranging his/her routine to allow for rest breaks in the morning routine  6. Educated on using a mantra and medication to decrease feelings of anxiety, especially when short of breath  7. Educated on looking at the consequences of his/her actions before deciding he/she needs to take on a task (e.g not getting down on one's hands and knees to wash floors because it will take all of one's energy for the day and result in exhaustion). 8. Educated on DME used to help conserve energy, such as a shower seat, a stool or chair in the kitchen, and pushing or pulling items instead of carrying them. 9. Educated on fall alert necklaces/bracelets to increase safety    Bathing: Patient instructed when bathing to not submerge wound in water, stand to shower or sponge bathe, cover wound with plastic and tape to ensure no water reaches bandage/wound without cues.   Patient indicated understanding. Dressing joint: Patient instructed and demonstrated to don/doff LLE first/last verbal cues. Patient instructed and demonstrated to don all clothing while sitting prior to standing, doff all clothing to knees while standing, then sit to doff clothing off from knees to feet in order to facilitate fall prevention, pain management, and energy conservation with Supervision. Home safety: Patient instructed on home modifications and safety (raise height of ADL objects, appropriate height of chair surfaces, recliner safety, change of floor surfaces, clear pathways) to increase independence and fall prevention. Patient indicated understanding. Standing: Patient instructed and demonstrated to walk up to sink/counter top/surfaces, step into walker to increase safety of joint and fall prevention with Supervision. Patient educated about hip anatomy verbally and educated to avoid sudden and extreme movements of LLE. Instructed to apply concept to ADLs within the home (square off while using objects, slide objects along surfaces). Patient instructed to increase amount of time standing, observe standing position during ADLs in order to increase even weight bearing through bilateral LEs in order to increase independence with ADLs. Goal to be reached 30 days post - op, per orthopedic surgeon or per PT. Patient indicated understanding. Tub transfer: Patient instructed regarding when it is safe to begin transfer into tub (complete stairs with PT, advance exercises with PT high enough to clear tub height). Patient instructed to use the same technique as used with stairs when entering and exiting tub (\"up with the non-surgical, down with the surgical leg\"). Patient indicated understanding.     Functional Measure:  Barthel Index:    Bathin  Bladder: 10  Bowels: 10  Groomin  Dressin  Feeding: 10  Mobility: 0  Stairs: 0  Toilet Use: 5  Transfer (Bed to Chair and Back): 10  Total: 55       Barthel and G-code impairment scale:  Percentage of impairment CH  0% CI  1-19% CJ  20-39% CK  40-59% CL  60-79% CM  80-99% CN  100%   Barthel Score 0-100 100 99-80 79-60 59-40 20-39 1-19   0   Barthel Score 0-20 20 17-19 13-16 9-12 5-8 1-4 0      The Barthel ADL Index: Guidelines  1. The index should be used as a record of what a patient does, not as a record of what a patient could do. 2. The main aim is to establish degree of independence from any help, physical or verbal, however minor and for whatever reason. 3. The need for supervision renders the patient not independent. 4. A patient's performance should be established using the best available evidence. Asking the patient, friends/relatives and nurses are the usual sources, but direct observation and common sense are also important. However direct testing is not needed. 5. Usually the patient's performance over the preceding 24-48 hours is important, but occasionally longer periods will be relevant. 6. Middle categories imply that the patient supplies over 50 per cent of the effort. 7. Use of aids to be independent is allowed. Gretchen Barnes., Barthel, D.W. ([id number]). Functional evaluation: the Barthel Index. [address] [facility] (14)2. Lia Hutchins alphonso JOSELO Vera, Joy Melgar., Lamar Russo., [place], [address] [place] [address] (1999). Measuring the change indisability after inpatient rehabilitation; comparison of the responsiveness of the Barthel Index and Functional [place]er Measure. Journal of Neurology, Neurosurgery, and Psychiatry, 66(4), 745-693. Danni Abdullahi N.J.A, OSCAR Greer, & Mayela Gaffney M.A. (2004.) Assessment of post-stroke quality of life in cost-effectiveness studies: The usefulness of the Barthel Index and the EuroQoL-5D. Quality of Life Research, 13, 936-34       G codes: In compliance with CMSs Claims Based Outcome Reporting, the following G-code set was chosen for this patient based on their primary functional limitation being treated:     The outcome measure chosen to determine the severity of the functional limitation was the Barthel Index with a score of 55/100 which was correlated with the impairment scale. ? Self Care:     - CURRENT STATUS: CK - 40%-59% impaired, limited or restricted    - GOAL STATUS: CJ - 20%-39% impaired, limited or restricted    - D/C STATUS:  ---------------To be determined---------------     Occupational Therapy Evaluation Charge Determination   History Examination Decision-Making   LOW Complexity : Brief history review  MEDIUM Complexity : 3-5 performance deficits relating to physical, cognitive , or psychosocial skils that result in activity limitations and / or participation restrictions LOW Complexity : No comorbidities that affect functional and no verbal or physical assistance needed to complete eval tasks       Based on the above components, the patient evaluation is determined to be of the following complexity level: LOW     Pain:  Pain Scale 1: Numeric (0 - 10)  Pain Intensity 1: 0  Pain Location 1: Hip  Pain Orientation 1: Left  Pain Description 1: Aching  Pain Intervention(s) 1: Medication (see MAR)  Activity Tolerance:   Fair  Please refer to the flowsheet for vital signs taken during this treatment. After treatment:   [] Patient left in no apparent distress sitting up in chair  [x] Patient left in no apparent distress in bed  [x] Call bell left within reach  [x] Nursing notified  [] Caregiver present  [] Bed alarm activated    COMMUNICATION/EDUCATION:   The patients plan of care was discussed with: Physical Therapist and Registered Nurse. [x]    Home safety education was provided and the patient/caregiver indicated understanding. [x]    Patient/family have participated as able in goal setting and plan of care. [x]    Patient/family agree to work toward stated goals and plan of care. []    Patient understands intent and goals of therapy, but is neutral about his/her participation.   []    Patient is unable to participate in goal setting and plan of care. This patients plan of care is appropriate for delegation to B[facility].     Thank you for this referral.  Ld uBrrell OT  Time Calculation: 24 mins

## 2018-04-18 NOTE — PROGRESS NOTES
Bedside shift change report given to Ophelia Antonio RN (oncoming nurse) by Gris Galvan (offgoing nurse). Report included the following information SBAR, Kardex, Intake/Output, MAR and Recent Results.

## 2018-04-18 NOTE — PROGRESS NOTES
Complaints: pain  Events: none    VSS / AFEBRILE      GEN:  NAD. AOx3   ABD:  S/NT/ND   LLE:  Dressing C/D/I    5/5 motor    Calf nttp (Bilat)    Sensate all distribution to light touch    1+ dp/pt pulses, foot perfused      Lab Results   Component Value Date/Time    HGB 8.2 (L) 04/18/2018 04:49 AM    INR 1.0 04/05/2018 01:01 PM       Lab Results   Component Value Date/Time    Sodium 140 04/18/2018 04:49 AM    Potassium 4.2 04/18/2018 04:49 AM    Chloride 108 04/18/2018 04:49 AM    CO2 24 04/18/2018 04:49 AM    BUN 14 04/18/2018 04:49 AM    Creatinine 1.16 (H) 04/18/2018 04:49 AM    Calcium 8.0 (L) 04/18/2018 04:49 AM    Magnesium 1.8 09/08/2014 12:05 PM            POD #1 LEFT TOTAL HIP REPLACEMENT. Satisfactory progress. Patient is doing well. She complains of moderate pain. She has not ambulated yet due to pain. Plan is to see how PT goes today, possible discharge to [address] [place] [address] tomorrow if there are no issues and she continues to make good progress.     ABX: Complete today  PATHWAY: D/C [place] per protocol  DVT Prophylaxis: ASA  Weight Bearing: WBAT LLE   Pain Control: PRN oral narcotics, celebrex  Anticipated Discharge Date: tomorrow   Disposition: [facility]

## 2018-04-18 NOTE — DISCHARGE INSTRUCTIONS
Discharge Instructions Anterior Approach Hip Replacement  Dr. Ernesto Donohue    Patient Enrrique Banks  Date of procedure:4/17/2018    Procedure:Procedure(s):  LEFT TOTAL HIP ARTHROPLASTY (LATEX FREE ROOM)  Surgeon:Surgeon(s) and Role:     * Kaylyn Hayes MD - Primary   PCP: Sammie Macias MD  Date of discharge: No discharge date for patient encounter. Follow up appointments   Follow up with Dr. Ernesto Donohue in 3 weeks. Call [phone] to make an appointment.  If home health has been arranged for you the agency will contact you to arrange dates/times for visits. Please call them if you do not hear from them within 24 hours after you are discharged    When to call your Orthopaedic Surgeon: Call [phone]. If you need to reach us after 5pm or on a weekend; call [phone] and the on call physician will be contacted   Increased hip pain, unrelieved pain or if you have difficulty or are unable to walk   Signs of infection-if your incision is red; continues to have drainage; drainage has a foul odor or if you have a persistent fever over 101 degrees   Signs of a blood clot in your leg-calf pain, tenderness, redness, swelling of lower leg    When to call your Primary Care Physician:   Concerns about medical conditions such as diabetes, high blood pressure, asthma, congestive heart failure   Call if blood sugars are elevated, persistent headache or dizziness, coughing or congestion, constipation or diarrhea, burning with urination, abnormal heart rate    When to call [phone] go to the nearest emergency room   Acute onset of chest pain, shortness of breath, difficulty breathing    Activity   Weight bearing as tolerated with walker or crutches.  Refer to pages 23-33 of your handbook for instructions and pictures   Complete your Home Exercise Program daily as instructed by your therapist.  Refer to pages 36-42 of your handbook for instructions and pictures   Get up every one hour and walk (except at night when sleeping)   AVOID sudden and extreme movement of your hip (surgical leg)   Do not drive or operate heavy machinery    Incision Care     The Aquacel (brown, waterproof) surgical dressing is to remain on your hip for 7 days. On the 7th day have someone gently peel the dressing off by carefully lifting the edge and stretching it slightly to break the adhesive seal and leave incision open to air. You may take a shower with the Aquacel dressing in place.  You do not have staples in your hip incision; if present they will be removed by the Home Health staff in 10-14 days and steri-strips will be applied. Leave the steri-strips on until they fall off    Preventing blood clot   Take Aspirin as prescribed. Plavix    Pain management   Take pain medication as prescribed; decrease the amount you use as your pain lessens   Avoid alcoholic beverages while taking pain medication   Do not take any over-the-counter medication except for Tylenol (acetaminophen)   Please be aware that many medications contain Tylenol. We do not want you to over medicate so please read the information below as a guide. Do not take more than 3 Grams of Tylenol in a 24 hour period. (There are 1000 milligrams in one Gram)  o 325 mg of Tylenol per tablet (do not take more than 9 tablets in 24 hours)  o 500 mg of Tylenol per tablet (do not take more than 6 tablets in 24 hours)   You may place an ice bag on your hip for 15-20 minutes after exercising and as needed throughout the day and night    Diet  Resume usual diet; drink plenty of fluids; eat foods high in fiber  You may want to take a stool softener (such as Senokot-S or Colace) to prevent constipation while you are taking pain medication.   If constipation occurs, take a laxative (such as Dulcolax tablets, Milk of Magnesia, or a suppository)      Patient meets criteria for   BUNDLED PAYMENT   for Care Improvement Initiative Criteria    Contact Information for Orthopedic Nurse Navigator:      JOHN Phipps, RN-BC  O:[phone]  W:[phone]  I:[phone]             Discharge Instructions Anterior Approach Hip Replacement  Dr. Alphonso Villeda    Patient William Perez  Date of procedure:4/17/2018    Procedure:Procedure(s):  LEFT TOTAL HIP ARTHROPLASTY (LATEX FREE ROOM)  Surgeon:Surgeon(s) and Role:     * Kirk Ricardo MD - Primary   PCP: Mee Villagomez MD  Date of discharge: No discharge date for patient encounter. Follow up appointments   Follow up with Dr. Alphonso Villeda in 3 weeks. Call [phone] to make an appointment.  If home health has been arranged for you the agency will contact you to arrange dates/times for visits. Please call them if you do not hear from them within 24 hours after you are discharged    When to call your Orthopaedic Surgeon: Call [phone]. If you need to reach us after 5pm or on a weekend; call [phone] and the on call physician will be contacted   Increased hip pain, unrelieved pain or if you have difficulty or are unable to walk   Signs of infection-if your incision is red; continues to have drainage; drainage has a foul odor or if you have a persistent fever over 101 degrees   Signs of a blood clot in your leg-calf pain, tenderness, redness, swelling of lower leg    When to call your Primary Care Physician:   Concerns about medical conditions such as diabetes, high blood pressure, asthma, congestive heart failure   Call if blood sugars are elevated, persistent headache or dizziness, coughing or congestion, constipation or diarrhea, burning with urination, abnormal heart rate    When to call [phone] go to the nearest emergency room   Acute onset of chest pain, shortness of breath, difficulty breathing    Activity   Weight bearing as tolerated with walker or crutches.  Refer to pages 23-33 of your handbook for instructions and pictures   Complete your Home Exercise Program daily as instructed by your therapist. Refer to pages 36-42 of your handbook for instructions and pictures   Get up every one hour and walk (except at night when sleeping)   AVOID sudden and extreme movement of your hip (surgical leg)   Do not drive or operate heavy machinery    Incision Care     The Aquacel (brown, waterproof) surgical dressing is to remain on your hip for 7 days. On the 7th day have someone gently peel the dressing off by carefully lifting the edge and stretching it slightly to break the adhesive seal and leave incision open to air. You may take a shower with the Aquacel dressing in place.  You do not have staples in your hip incision; if present they will be removed by the Home Health staff in 10-14 days and steri-strips will be applied. Leave the steri-strips on until they fall off    Preventing blood clot   Take Aspirin and Plavix as prescribed    Pain management   Take pain medication as prescribed; decrease the amount you use as your pain lessens   Avoid alcoholic beverages while taking pain medication   Do not take any over-the-counter medication except for Tylenol (acetaminophen)   Please be aware that many medications contain Tylenol. We do not want you to over medicate so please read the information below as a guide. Do not take more than 3 Grams of Tylenol in a 24 hour period. (There are 1000 milligrams in one Gram)  o 325 mg of Tylenol per tablet (do not take more than 9 tablets in 24 hours)  o 500 mg of Tylenol per tablet (do not take more than 6 tablets in 24 hours)   You may place an ice bag on your hip for 15-20 minutes after exercising and as needed throughout the day and night    Diet  Resume usual diet; drink plenty of fluids; eat foods high in fiber  You may want to take a stool softener (such as Senokot-S or Colace) to prevent constipation while you are taking pain medication.   If constipation occurs, take a laxative (such as Dulcolax tablets, Milk of Magnesia, or a suppository)

## 2018-04-18 NOTE — PROGRESS NOTES
[place]. Patient not symptomatic. No new orders at this time. Will continue to monitor.

## 2018-04-18 NOTE — PROGRESS NOTES
Problem: Mobility Impaired (Adult and Pediatric)  Goal: *Acute Goals and Plan of Care (Insert Text)  Physical Therapy Goals  Initiated 4/17/2018    1. Patient will move from supine to sit and sit to supine , scoot up and down and roll side to side in bed with independence within 4 days. 2. Patient will perform sit to stand with independence within 4 days. 3. Patient will ambulate with independence for 100 feet with the least restrictive device within 4 days. 4. Patient will ascend/descend 4 stairs with 2 handrail(s) with supervision/set-up within 4 days. 5. Patient will verbalize and demonstrate understanding of anterior THR precautions per protocol within 4 days. 6. Patient will perform anterior THR home exercise program per protocol with independence within 4 days. physical Therapy TREATMENT  Patient: Michael Duran (39 y.o. female)  Date: 4/18/2018  Diagnosis: LEFT HIP M87.052  Avascular necrosis of hip, left (HCC) <principal problem not specified>  Procedure(s) (LRB):  LEFT TOTAL HIP ARTHROPLASTY (LATEX FREE ROOM) (Left) 1 Day Post-Op  Precautions: Fall, WBAT  Chart, physical therapy assessment, plan of care and goals were reviewed. ASSESSMENT:  Sitting EOB patient's BP is 101/61 which is the highest it has been all day. Due to her low BP we had 2 present for safety. She requires moderate assist to come to sitting. Able to scoot to the EOB once up but she tends to get stuck in the middle of the bed. Able to stand with min assist of 2 and ambulated 20 feet in the room with a RW. Slow antalgic gait. Returned to the chair. Ice to hip. Able to do LAQ and ankle pumps. Also did the inspirometer. Cooperative and motivated.    Progression toward goals:  []      Improving appropriately and progressing toward goals  [x]      Improving slowly and progressing toward goals  []      Not making progress toward goals and plan of care will be adjusted     PLAN:  Patient continues to benefit from skilled intervention to address the above impairments. Continue treatment per established plan of care. Discharge Recommendations:  [facility]  Further Equipment Recommendations for Discharge:  none     SUBJECTIVE:   I haven't slept all day. OBJECTIVE DATA SUMMARY:   Functional Mobility Training:  Bed Mobility:  Rolling: Modified independent  Supine to Sit: Assist x1; Moderate assistance; Additional time  Sit to Supine: Assist x1;Additional time;Minimum assistance  Scooting: Supervision        Transfers:  Sit to Stand: Minimum assistance;Assist x2; Additional time  Stand to Sit: Assist x2;Contact guard assistance                             Ambulation/Gait Training:  Distance (ft): 20 Feet (ft)  Assistive Device: Walker, rolling;Gait belt  Ambulation - Level of Assistance: Assist x2;Contact guard assistance        Gait Abnormalities: Decreased step clearance     Left Side Weight Bearing: As tolerated  Base of Support: Shift to right     Speed/Juliana: Pace decreased (<100 feet/min)  Step Length: Right shortened;Left shortened            Slow antalgic gait. Therapeutic Exercises:   Sitting ex of ankle pumps and LAQ. Pain:  Pain Scale 1: Numeric (0 - 10)  Pain Intensity 1: 0  Pain Location 1: Hip  Pain Orientation 1: Left  Pain Description 1: Aching  Pain Intervention(s) 1: Medication (see MAR)  Activity Tolerance:   Improving. Please refer to the flowsheet for vital signs taken during this treatment.   After treatment:   [x] Patient left in no apparent distress sitting up in chair  [] Patient left in no apparent distress in bed  [x] Call bell left within reach  [x] Nursing notified  [] Caregiver present  [] Bed alarm activated    COMMUNICATION/COLLABORATION:   The patients plan of care was discussed with: Registered Nurse and Rehabilitation Attendant    Cherylene Lesser, PT   Time Calculation: 26 mins

## 2018-04-19 LAB — HGB BLD-MCNC: 8.3 G/DL (ref 11.5–16)

## 2018-04-19 PROCEDURE — 36415 COLL VENOUS BLD VENIPUNCTURE: CPT | Performed by: PHYSICIAN ASSISTANT

## 2018-04-19 PROCEDURE — 74011250637 HC RX REV CODE- 250/637: Performed by: ORTHOPAEDIC SURGERY

## 2018-04-19 PROCEDURE — 97110 THERAPEUTIC EXERCISES: CPT | Performed by: PHYSICAL THERAPIST

## 2018-04-19 PROCEDURE — 97535 SELF CARE MNGMENT TRAINING: CPT

## 2018-04-19 PROCEDURE — 85018 HEMOGLOBIN: CPT | Performed by: PHYSICIAN ASSISTANT

## 2018-04-19 PROCEDURE — 65270000029 HC RM PRIVATE

## 2018-04-19 PROCEDURE — 97116 GAIT TRAINING THERAPY: CPT | Performed by: PHYSICAL THERAPIST

## 2018-04-19 PROCEDURE — 74011250637 HC RX REV CODE- 250/637: Performed by: PHYSICIAN ASSISTANT

## 2018-04-19 RX ADMIN — Medication 10 ML: at 14:00

## 2018-04-19 RX ADMIN — POLYETHYLENE GLYCOL 3350 17 G: 17 POWDER, FOR SOLUTION ORAL at 08:57

## 2018-04-19 RX ADMIN — ASPIRIN 81 MG: 81 TABLET, COATED ORAL at 08:53

## 2018-04-19 RX ADMIN — STANDARDIZED SENNA CONCENTRATE AND DOCUSATE SODIUM 1 TABLET: 8.6; 5 TABLET, FILM COATED ORAL at 08:54

## 2018-04-19 RX ADMIN — GABAPENTIN 300 MG: 300 CAPSULE ORAL at 15:20

## 2018-04-19 RX ADMIN — ALLOPURINOL 150 MG: 100 TABLET ORAL at 08:54

## 2018-04-19 RX ADMIN — BENZOCAINE AND MENTHOL 1 LOZENGE: 15; 3.6 LOZENGE ORAL at 20:02

## 2018-04-19 RX ADMIN — CLOPIDOGREL BISULFATE 75 MG: 75 TABLET ORAL at 08:54

## 2018-04-19 RX ADMIN — STANDARDIZED SENNA CONCENTRATE AND DOCUSATE SODIUM 1 TABLET: 8.6; 5 TABLET, FILM COATED ORAL at 17:33

## 2018-04-19 RX ADMIN — Medication 10 ML: at 05:13

## 2018-04-19 RX ADMIN — METOPROLOL SUCCINATE 75 MG: 50 TABLET, EXTENDED RELEASE ORAL at 20:04

## 2018-04-19 RX ADMIN — ACETAMINOPHEN 1000 MG: 500 TABLET, FILM COATED ORAL at 17:33

## 2018-04-19 RX ADMIN — CELECOXIB 200 MG: 200 CAPSULE ORAL at 17:33

## 2018-04-19 RX ADMIN — ACETAMINOPHEN 1000 MG: 500 TABLET, FILM COATED ORAL at 11:43

## 2018-04-19 RX ADMIN — GABAPENTIN 300 MG: 300 CAPSULE ORAL at 08:54

## 2018-04-19 RX ADMIN — CELECOXIB 200 MG: 200 CAPSULE ORAL at 08:54

## 2018-04-19 RX ADMIN — ACETAMINOPHEN 1000 MG: 500 TABLET, FILM COATED ORAL at 05:13

## 2018-04-19 RX ADMIN — GABAPENTIN 300 MG: 300 CAPSULE ORAL at 21:32

## 2018-04-19 RX ADMIN — ACETAMINOPHEN 1000 MG: 500 TABLET, FILM COATED ORAL at 23:32

## 2018-04-19 NOTE — PROGRESS NOTES
Problem: Mobility Impaired (Adult and Pediatric)  Goal: *Acute Goals and Plan of Care (Insert Text)  Physical Therapy Goals  Initiated 4/17/2018    1. Patient will move from supine to sit and sit to supine , scoot up and down and roll side to side in bed with independence within 4 days. 2. Patient will perform sit to stand with independence within 4 days. 3. Patient will ambulate with independence for 100 feet with the least restrictive device within 4 days. 4. Patient will ascend/descend 4 stairs with 2 handrail(s) with supervision/set-up within 4 days. 5. Patient will verbalize and demonstrate understanding of anterior THR precautions per protocol within 4 days. 6. Patient will perform anterior THR home exercise program per protocol with independence within 4 days. physical Therapy TREATMENT  Patient: Jas Ghotra (75 y.o. female)  Date: 4/19/2018  Diagnosis: LEFT HIP M87.052  Avascular necrosis of hip, left (HCC) <principal problem not specified>  Procedure(s) (LRB):  LEFT TOTAL HIP ARTHROPLASTY (LATEX FREE ROOM) (Left) 2 Days Post-Op  Precautions: Fall, WBAT  Chart, physical therapy assessment, plan of care and goals were reviewed. ASSESSMENT:  Patient making steady progress toward goals. Received supine in bed and needs Logan for supine to sit with extra time to complete task. Sit to stand with CGA and cues for technique. Amb approx 80 feet with RW and CGA. Initially with step-to pattern but progresses to step through with increased time and cues. BP still on low side but patient states this is her normally. Left up in chair with needs in reach. Recommend Rehab at [place] to continue mobility progression.   Progression toward goals:  [x]      Improving appropriately and progressing toward goals  []      Improving slowly and progressing toward goals  []      Not making progress toward goals and plan of care will be adjusted     PLAN:  Patient continues to benefit from skilled intervention to address the above impairments. Continue treatment per established plan of care. Discharge Recommendations:  SNF rehab  Further Equipment Recommendations for Discharge:  TBD     SUBJECTIVE:   Patient stated I'm doing better today.     OBJECTIVE DATA SUMMARY:   Critical Behavior:  Neurologic State: Alert, Appropriate for age  Orientation Level: Oriented X4  Cognition: Appropriate decision making, Appropriate for age attention/concentration, Appropriate safety awareness, Follows commands  Safety/Judgement: Awareness of environment, Driving appropriateness, Fall prevention, Good awareness of safety precautions, Home safety, Insight into deficits           Functional Mobility Training:  Bed Mobility:     Supine to Sit: Minimum assistance; Additional time;Assist x1     Scooting: Supervision; Additional time        Transfers:  Sit to Stand: Contact guard assistance;Assist x1  Stand to Sit: Contact guard assistance;Assist x1                             Balance:  Sitting: Intact  Standing: Intact; With support  Standing - Static: Constant support;Good  Standing - Dynamic : Good  Ambulation/Gait Training:  Distance (ft): 80 Feet (ft)  Assistive Device: Gait belt;Walker, rolling  Ambulation - Level of Assistance: Contact guard assistance;Assist x1        Gait Abnormalities: Antalgic;Decreased step clearance              Speed/Juliana: Pace decreased (<100 feet/min); Slow  Step Length: Left shortened;Right shortened           Therapeutic Exercises:     EXERCISE   Sets   Reps   Active Active Assist   Passive Self ROM   Comments   Ankle Pumps 1 10 []                                        []                                        []                                        []                                           Quad Sets 1 10 []                                        []                                        []                                        []                                           Hamstring Sets   [] []                                        []                                        []                                           Short Arc Quads   []                                        []                                        []                                        []                                           Knee Extension Stretch     []                                          []                                          []                                          []                                           Heel Slides 1 10 []                                        []                                        []                                        []                                           Long Arc Quads   []                                        []                                        []                                        []                                           Knee Flexion Stretch   []                                        []                                        []                                        []                                           Straight Leg Raises   []                                        []                                        []                                        []                                             Pain:  Pain Scale 1: Numeric (0 - 10)  Pain Intensity 1: 0              Activity Tolerance:   VSS  Please refer to the flowsheet for vital signs taken during this treatment.   After treatment:   [x] Patient left in no apparent distress sitting up in chair  [] Patient left in no apparent distress in bed  [x] Call bell left within reach  [x] Nursing notified  [] Caregiver present  [] Bed alarm activated    COMMUNICATION/COLLABORATION:   The patients plan of care was discussed with: Physical Therapist, Occupational Therapist and Registered Nurse    Preston Camacho, PT, DPT   Time Calculation: 24 mins

## 2018-04-19 NOTE — PROGRESS NOTES
Bedside shift change report given to Hesham Mejia (oncoming nurse) by Quan Avina (offgoing nurse). Report included the following information SBAR, Kardex, Intake/Output, MAR and Recent Results.

## 2018-04-19 NOTE — PROGRESS NOTES
Complaints: none  Events: none    GEN:  NAD. AOx3   ABD:  S/NT/ND   LLE:  Dressing C/D/I    5/5 motor    Calf nttp (Bilat)    Sensate all distribution to light touch    1+ dp/pt pulses, foot perfused      Lab Results   Component Value Date/Time    HGB 8.3 (L) 04/19/2018 05:36 AM    INR 1.0 04/05/2018 01:01 PM            POD #2 LEFT TOTAL HIP REPLACEMENT. Satisfactory progress. Patient is doing much better than yesterday. She is ambulating with PT without issue. Her pain is well-controlled. She would like to go to a rehabilitation facility when a bed is available.      DVT Prophylaxis: Plavix and ASA   Weight Bearing: WBAT RLE   Pain Control: PRN oral narcotics, celebrex  Anticipated Discharge Date: tomorrow  Disposition: [facility]

## 2018-04-19 NOTE — PROGRESS NOTES
Bedside shift change report given to Edilson Marquez (oncoming nurse) by Carmen Vaughn (offgoing nurse). Report included the following information SBAR, Kardex, OR Summary, Intake/Output, MAR and Recent Results.

## 2018-04-19 NOTE — PROGRESS NOTES
Chart reviewed. Carmen of [facility] has accepted patient and will have a bed tomorrow. They are verifying her Medicare days. CM will continue to follow. 11:00 AM: Spoke with Tamiko at [address] [place] [address]. Confirmed they can accept patient tomorrow. She will go into room #503 and RN is to call report to [phone].       LULI Trujillo/CRM

## 2018-04-19 NOTE — PROGRESS NOTES
Problem: Mobility Impaired (Adult and Pediatric)  Goal: *Acute Goals and Plan of Care (Insert Text)  Physical Therapy Goals  Initiated 4/17/2018    1. Patient will move from supine to sit and sit to supine , scoot up and down and roll side to side in bed with independence within 4 days. 2. Patient will perform sit to stand with independence within 4 days. 3. Patient will ambulate with independence for 100 feet with the least restrictive device within 4 days. 4. Patient will ascend/descend 4 stairs with 2 handrail(s) with supervision/set-up within 4 days. 5. Patient will verbalize and demonstrate understanding of anterior THR precautions per protocol within 4 days. 6. Patient will perform anterior THR home exercise program per protocol with independence within 4 days. physical Therapy TREATMENT  Patient: Loyda Reyes (24 y.o. female)  Date: 4/19/2018  Diagnosis: LEFT HIP M87.052  Avascular necrosis of hip, left (HCC) <principal problem not specified>  Procedure(s) (LRB):  LEFT TOTAL HIP ARTHROPLASTY (LATEX FREE ROOM) (Left) 2 Days Post-Op  Precautions: Fall, WBAT  Chart, physical therapy assessment, plan of care and goals were reviewed. ASSESSMENT:  Patient making steady progress toward goals. Needs CGA for sit to stand transfers. Amb approx 110 feet with RW and CGA with slow raquel and decreased step clearance. Continues with step-to pattern and cues for technique. Returned to supine at end of session and needs Logan for sit to supine. Patient continues to be appropriate for DC to SNF level rehab secondary to pain and lives alone. Progression toward goals:  [x]      Improving appropriately and progressing toward goals  []      Improving slowly and progressing toward goals  []      Not making progress toward goals and plan of care will be adjusted     PLAN:  Patient continues to benefit from skilled intervention to address the above impairments.   Continue treatment per established plan of care.  Discharge Recommendations:  Home Health  Further Equipment Recommendations for Discharge:  TBD     SUBJECTIVE:   Patient stated I'm doing better than I did yesterday.     OBJECTIVE DATA SUMMARY:   Critical Behavior:  Neurologic State: Alert, Appropriate for age  Orientation Level: Oriented X4  Cognition: Appropriate decision making, Appropriate for age attention/concentration, Appropriate safety awareness, Follows commands  Safety/Judgement: Awareness of environment, Driving appropriateness, Fall prevention, Good awareness of safety precautions, Home safety, Insight into deficits  Functional Mobility Training:  Bed Mobility:     Supine to Sit: Minimum assistance; Additional time;Assist x1  Sit to Supine: Minimum assistance;Assist x1  Scooting: Supervision        Transfers:  Sit to Stand: Contact guard assistance  Stand to Sit: Contact guard assistance                             Balance:  Sitting: Intact  Standing: Intact; With support  Standing - Static: Constant support;Good  Standing - Dynamic : Good  Ambulation/Gait Training:  Distance (ft): 110 Feet (ft)  Assistive Device: Gait belt;Walker, rolling  Ambulation - Level of Assistance: Contact guard assistance        Gait Abnormalities: Antalgic;Decreased step clearance; Step to gait     Left Side Weight Bearing: As tolerated        Speed/Juliana: Pace decreased (<100 feet/min)  Step Length: Left shortened;Right shortened       Pain:  Pain Scale 1: Numeric (0 - 10)  Pain Intensity 1: 0              Activity Tolerance:   VSS  Please refer to the flowsheet for vital signs taken during this treatment.   After treatment:   [] Patient left in no apparent distress sitting up in chair  [x] Patient left in no apparent distress in bed  [x] Call bell left within reach  [x] Nursing notified  [x] Caregiver present  [] Bed alarm activated    COMMUNICATION/COLLABORATION:   The patients plan of care was discussed with: Physical Therapist, Occupational Therapist and Registered Nurse    Alma Alves, PT, DPT   Time Calculation: 20 mins

## 2018-04-19 NOTE — PROGRESS NOTES
Problem: Self Care Deficits Care Plan (Adult)  Goal: *Acute Goals and Plan of Care (Insert Text)  Occupational Therapy Goals  Initiated 4/18/2018  1. Patient will perform lower body ADLs with AE supervision/set-up within 4 day(s). 2.  Patient will perform upper body ADLs standing 5 mins without fatigue or LOB with supervision/set-up within 4 day(s). 3.  Patient will perform toilet transfer with supervision/set-up within 4 day(s). 4.  Patient will perform all aspects of toileting with supervision/set-up within 4 day(s). 5.  Patient will participate in upper extremity therapeutic exercise/activities with independence for 10 minutes within 4 day(s). 6.  Patient will utilize energy conservation techniques during functional activities without cues within 4 day(s). Occupational Therapy TREATMENT  Patient: Graham Jara (24 y.o. female)  Date: 4/19/2018  Diagnosis: LEFT HIP M87.052  Avascular necrosis of hip, left (HCC) <principal problem not specified>  Procedure(s) (LRB):  LEFT TOTAL HIP ARTHROPLASTY (LATEX FREE ROOM) (Left) 2 Days Post-Op  Precautions: Fall, WBAT  Chart, occupational therapy assessment, plan of care, and goals were reviewed. ASSESSMENT:  Patient presents with impaired functional reach to feet, impaired standing balance, L hip pain, impaired functional mobility, and impaired ADL independence s/p L total hip arthroplasty. Patient performs supine to sit with additional time using bed rail at SPV level, performs sit-supine with SPV after instruction using gait belt to lift LLE into bed. Patient receptive to education using AE for LB dressing. Practiced doffing socks with dressing stick, donned socks with sock aide with set-up. Patient also practiced doffing pants over ankles with dressing stick, threading pants with reacher, stood with CGA using RW for raising/ lowering pants. Patient instructed to dress LLE first/ undress last.  Continue to recommend rehab at discharge.   Noted patient has been accepted at [address] [place] [address] and is planning to discharge there tomorrow. Progression toward goals:  [x]          Improving appropriately and progressing toward goals  []          Improving slowly and progressing toward goals  []          Not making progress toward goals and plan of care will be adjusted     PLAN:  Patient continues to benefit from skilled intervention to address the above impairments. Continue treatment per established plan of care. Discharge Recommendations:  Rehab  Further Equipment Recommendations for Discharge:  TBD     SUBJECTIVE:   Patient stated I can do it. It just takes a little more time.     OBJECTIVE DATA SUMMARY:   Cognitive/Behavioral Status:  Neurologic State: Alert  Orientation Level: Oriented X4  Cognition: Appropriate decision making, Appropriate for age attention/concentration, Appropriate safety awareness, Follows commands  Safety/Judgement: Awareness of environment, Good awareness of safety precautions, Insight into deficits    Functional Mobility and Transfers for ADLs:  Bed Mobility:  Supine to Sit: Supervision; Additional time  Sit to Supine: Supervision; Additional time; using gait belt to lift LLE into bed  Scooting: Supervision using bed rail    Transfers:  Sit to Stand: Contact guard assistance            Balance:  Sitting: Intact  Standing: Impaired  Standing - Static: Good  Standing - Dynamic : Fair    ADL Intervention and Instruction:         Lower Body Dressing Assistance  Pants With Elastic Waist: Contact guard assistance; Compensatory technique training (doffs over ankles dressing stick, threads with reacher, CGA for standing to lower/ raise with RW)  Socks: Supervision/set-up; Compensatory technique training (doffs with dressing stick, dons with sock aide)         Cognitive Retraining  Safety/Judgement: Awareness of environment;Good awareness of safety precautions; Insight into deficits    Dressing joint: Patient instructed and demonstrated to don/doff LLE first/last with verbal cues. Patient instructed and demonstrated to don all clothing while sitting prior to standing, doff all clothing to knees while standing, then sit to doff clothing off from knees to feet in order to facilitate fall prevention, pain management, and energy conservation with contact guard assistance. Pain:  Pain Scale 1: Numeric (0 - 10)  Pain Intensity 1: 0              Activity Tolerance:   Vitals:    04/19/18 1524 04/19/18 1530   BP: 100/59 113/65   BP 1 Location: Right arm Right arm   BP Patient Position: Supine; At rest Sitting;During activity   Pulse: 83 92     Please refer to the flowsheet for vital signs taken during this treatment.   After treatment:   [] Patient left in no apparent distress sitting up in chair  [x] Patient left in no apparent distress in bed  [x] Call bell left within reach  [x] Nursing notified  [] Caregiver present  [] Bed alarm activated    COMMUNICATION/COLLABORATION:   The patients plan of care was discussed with: Registered Nurse    John Ponce OT  Time Calculation: 39 mins

## 2018-04-20 VITALS
HEART RATE: 65 BPM | RESPIRATION RATE: 16 BRPM | SYSTOLIC BLOOD PRESSURE: 118 MMHG | DIASTOLIC BLOOD PRESSURE: 64 MMHG | OXYGEN SATURATION: 97 % | HEIGHT: 60 IN | WEIGHT: 148 LBS | TEMPERATURE: 98.3 F | BODY MASS INDEX: 29.06 KG/M2

## 2018-04-20 PROCEDURE — 74011250637 HC RX REV CODE- 250/637: Performed by: PHYSICIAN ASSISTANT

## 2018-04-20 PROCEDURE — 97116 GAIT TRAINING THERAPY: CPT | Performed by: PHYSICAL THERAPIST

## 2018-04-20 RX ORDER — OXYCODONE HYDROCHLORIDE 5 MG/1
2.5-5 TABLET ORAL
Qty: 60 TAB | Refills: 0 | Status: SHIPPED | OUTPATIENT
Start: 2018-04-20 | End: 2018-07-19

## 2018-04-20 RX ADMIN — ACETAMINOPHEN 1000 MG: 500 TABLET, FILM COATED ORAL at 12:58

## 2018-04-20 RX ADMIN — CLOPIDOGREL BISULFATE 75 MG: 75 TABLET ORAL at 08:35

## 2018-04-20 RX ADMIN — STANDARDIZED SENNA CONCENTRATE AND DOCUSATE SODIUM 1 TABLET: 8.6; 5 TABLET, FILM COATED ORAL at 08:35

## 2018-04-20 RX ADMIN — ASPIRIN 81 MG: 81 TABLET, COATED ORAL at 08:35

## 2018-04-20 RX ADMIN — Medication 10 ML: at 06:37

## 2018-04-20 RX ADMIN — CELECOXIB 200 MG: 200 CAPSULE ORAL at 08:35

## 2018-04-20 RX ADMIN — ACETAMINOPHEN 1000 MG: 500 TABLET, FILM COATED ORAL at 06:36

## 2018-04-20 RX ADMIN — ALLOPURINOL 150 MG: 100 TABLET ORAL at 08:35

## 2018-04-20 RX ADMIN — GABAPENTIN 300 MG: 300 CAPSULE ORAL at 08:36

## 2018-04-20 NOTE — PROGRESS NOTES
..Bedside and Verbal shift change report given to Regulo Dennis (oncoming nurse) by Branden Arevalo (offgoing nurse). Report included the following information SBAR.

## 2018-04-20 NOTE — PROGRESS NOTES
I have reviewed discharge instructions with the patient. The patient verbalized understanding. No IV access. Patient set up for medical transport at [address] contains MAR, Kardex, prescriptions, and instructions. No further questions at this time.

## 2018-04-20 NOTE — PROGRESS NOTES
Bedside shift change report given to Hesham Mejia (oncoming nurse) by Jeanmarie Rasmussen (offgoing nurse). Report included the following information SBAR, Kardex, Intake/Output and MAR.

## 2018-04-20 NOTE — PROGRESS NOTES
TRANSFER - OUT REPORT:    Verbal report given to Delmis(name) on Alysa Dye  being transferred to [facility]) for routine progression of care       Report consisted of patients Situation, Background, Assessment and   Recommendations(SBAR). Information from the following report(s) SBAR, Kardex, OR Summary, Intake/Output, MAR and Recent Results was reviewed with the receiving nurse. Opportunity for questions and clarification was provided.

## 2018-04-20 NOTE — DISCHARGE SUMMARY
@[email or web address]@ [address] [place] [id number]    DISCHARGE SUMMARY     Patient: Alysa Dye                             Medical Record Number: [id number]                : 1933  Age: 80 y.o. Admit Date: 2018  Discharge Date:   Admission Diagnosis: LEFT HIP M87.052  Avascular necrosis of hip, left ([address]  Discharge Diagnosis: OSTEOARTHRITIS LEFT HIP  Procedures: Procedure(s):  LEFT TOTAL HIP ARTHROPLASTY (LATEX FREE ROOM)  Surgeon: Loyda Mann  Assistants: None  Anesthesia: general  Complications: None     History of Present Illness:  Alysa Dye is a 80 y.o. female with a history of Left hip pain, swelling, and marked loss of function. Despite conservative management and after clinical and radiographic evaluation, it was determined that she suffered from end-stage osteoarthritis and would benefit from Procedure(s):  LEFT TOTAL HIP ARTHROPLASTY (LATEX FREE ROOM), which she consented to undergo after a discussion of the risks, benefits, alternatives, rehab concerns, and potential complications of surgery. Hospital Course:  Alysa Dye tolerated the procedure well. She was transferred  to the recovery room in stable condition. After a brief stay the patient was then transferred to the Joint Replacement Unit at [address] [place] [address].  On postoperative day #1, the dressing was clean and dry, she was neurovascularly intact. The patient was afebrile and vital signs were stable. Calves were soft and non-tender bilaterally. On postoperative day  # 2, the patient was tolerating a regular diet and making satisfactory progress with physical therapy. Hemoglobin and INR prior to discharge were   Lab Results   Component Value Date/Time    HGB 8.3 (L) 2018 05:36 AM    INR 1.0 2018 01:01 PM   .  Alysa Dye made satisfactory progress with physical therapy and was discharged to SNF in stable condition on postoperative day 3.   She was provided with routine postoperative instructions and advised to follow up in my office in 3 weeks following discharge from the hospital.  She was prescribed ASA and Plavix for DVT prophylaxis and oxycodone for post-operative pain. Discharge Medications:  Current Discharge Medication List      START taking these medications    Details   oxyCODONE IR (ROXICODONE) 5 mg immediate release tablet Take 0.5-1 Tabs by mouth every three (3) hours as needed. Max Daily Amount: 40 mg.  Qty: 60 Tab, Refills: 0    Associated Diagnoses: Venous insufficiency         CONTINUE these medications which have NOT CHANGED    Details   traMADol (ULTRAM) 50 mg tablet Take 1 Tab by mouth every six (6) hours as needed for Pain. Max Daily Amount: 200 mg. Qty: 120 Tab, Refills: 3      bumetanide (BUMEX) 2 mg tablet Take 2 mg by mouth daily. Refills: 3      metoprolol succinate (TOPROL-XL) 50 mg XL tablet TAKE 1&1/2 TABLETS BY MOUTH EVERY HS  Refills: 3      cholecalciferol (VITAMIN D3) 1,000 unit cap Take 1,000 Units by mouth daily. aspirin delayed-release 81 mg tablet Take 81 mg by mouth daily. gabapentin (NEURONTIN) 300 mg capsule TAKE 1 CAPSULE BY MOUTH THREE TIMES A DAY  Qty: 270 Cap, Refills: 4      allopurinol (ZYLOPRIM) 300 mg tablet Take 1/2 tablet by mouth daily. For tophaceous gout  Qty: 45 Tab, Refills: 4      atorvastatin (LIPITOR) 20 mg tablet Take 20 mg by mouth nightly. lisinopril (PRINIVIL, ZESTRIL) 10 mg tablet Take 10 mg by mouth.      triamcinolone acetonide (KENALOG) 0.025 % topical cream Apply  to affected area. nitroglycerin (NITROSTAT) 0.4 mg SL tablet       clopidogrel (PLAVIX) 75 mg tab Take 75 mg by mouth daily.              Signed by: Venecia Sy MD  4/20/2018

## 2018-04-20 NOTE — NURSE NAVIGATOR
[address]  AR Bundled Payment Handoff     FROM:                                TO: [facility] [employer]                                                      ([facility] or Facility name)  [address]  [address] [facility] [address]  Dept: [phone]  Loc: [phone]                                      Room#:  563/01                                                       Nurse Navigator:  Marina Araujo RN           SITUATION      ASAScore: ASA 3 - Patient with moderate systemic disease with functional limitations    Admitted:  4/17/2018  Hospital Day: 4      Attending Provider:  Pepito Ferreira MD     Consultations:  None    PCP:  Rebecca Mittal MD   [phone]     Admitting Dx:  LEFT HIP G33.814  Avascular necrosis of hip, left [facility])       Active Problems:    * No active hospital problems. *    3 Days Post-Op of   Procedure(s):  LEFT TOTAL HIP ARTHROPLASTY (LATEX FREE ROOM)   BY: Pepito Ferreira MD             ON: 4/17/2018                  Code Status: Full Code             Advance Directive? Yes Not W Pt (Send w/patient)     Isolation:  There are currently no Active Isolations       MDRO: No current active infections    BACKGROUND     Allergies:   Allergies   Allergen Reactions    Amoxicillin-Pot Clavulanate Nausea and Vomiting    Cephalexin Nausea and Vomiting    Doxycycline Nausea and Vomiting    Neomycin-Polymyxin-Hc Nausea and Vomiting    Rocephin [Ceftriaxone] Nausea and Vomiting    Sulfamethoxazole-Trimethoprim Nausea and Vomiting       Past Medical History:   Diagnosis Date    Breast cancer ([facility] 1997    lumpectomy, RADIATION    Cancer ([facility] 2002    dcis left breast 2002    Cardiomyopathy [facility])     Coronary artery disease involving native coronary artery of native heart without angina pectoris 12/16/2016    multiple stents    CVA (cerebral vascular accident) ([facility] 09/13/2017    Dyslipidemia     Hives of unknown origin     Hypertension     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 Taking?   allopurinol (ZYLOPRIM) 300 mg tablet 4/16/2018 at Unknown time  No Yes   Sig: Take 1/2 tablet by mouth daily. For tophaceous gout   aspirin delayed-release 81 mg tablet 4/16/2018 at Unknown time  Yes Yes   Sig: Take 81 mg by mouth daily. atorvastatin (LIPITOR) 20 mg tablet 4/16/2018 at Unknown time  Yes Yes   Sig: Take 20 mg by mouth nightly. bumetanide (BUMEX) 2 mg tablet 4/16/2018 at Unknown time  Yes Yes   Sig: Take 2 mg by mouth daily. cholecalciferol (VITAMIN D3) 1,000 unit cap 4/16/2018 at Unknown time  Yes Yes   Sig: Take 1,000 Units by mouth daily. clopidogrel (PLAVIX) 75 mg tab 4/11/2018  Yes No   Sig: Take 75 mg by mouth daily. gabapentin (NEURONTIN) 300 mg capsule 4/16/2018 at Unknown time  No Yes   Sig: TAKE 1 CAPSULE BY MOUTH THREE TIMES A DAY   lisinopril (PRINIVIL, ZESTRIL) 10 mg tablet 4/16/2018 at Unknown time  Yes Yes   Sig: Take 10 mg by mouth.   metoprolol succinate (TOPROL-XL) 50 mg XL tablet 4/16/2018 at 2000  Yes Yes   Sig: TAKE 1&1/2 TABLETS BY MOUTH EVERY HS   nitroglycerin (NITROSTAT) 0.4 mg SL tablet Unknown at Unknown time  Yes No   traMADol (ULTRAM) 50 mg tablet 4/16/2018 at Unknown time  No Yes   Sig: Take 1 Tab by mouth every six (6) hours as needed for Pain. Max Daily Amount: 200 mg.   triamcinolone acetonide (KENALOG) 0.025 % topical cream 4/15/2018 at Unknown time  Yes Yes   Sig: Apply  to affected area.       Facility-Administered Medications: None       Vaccinations:    Immunization History   Administered Date(s) Administered    Influenza High Dose Vaccine PF 10/15/2015    Influenza Vaccine 09/11/2017    Pneumococcal Conjugate (PCV-13) 12/16/2016    Pneumococcal Vaccine (Unspecified Type) 06/06/2000    Zoster Vaccine, Live 03/06/2013         ASSESSMENT   Age: 80 y. o. Gender: female        Height: Height: 5' (152.4 cm)                    Weight:Weight: 67.1 kg (148 lb)     Patient Vitals for the past 8 hrs:   Temp Pulse Resp BP SpO2   04/20/18 0833 98.3 °F (36.8 °C) 65 16 118/64 97 %   04/20/18 0310 98.4 °F (36.9 °C) 62 16 124/74 98 %            Active Orders   Diet    DIET REGULAR       Orientation: Orientation Level: Oriented X4    Active Lines/Drains:  (Peg Tube / Cartwright / CL or S/L?):no    Urinary Status: Voiding      Last BM: Last Bowel Movement Date: 04/20/18     Skin Integrity: Incision (comment) (L hip)   Wound Hip Left; Anterior-DRESSING STATUS: Clean, dry, and intact    Wound Hip Left; Anterior-DRESSING TYPE: Silver products (aquacel)    Mobility: Slightly limited   Weight Bearing Status: WBAT (Weight Bearing as Tolerated)      Gait Training  Assistive Device: Gait belt, Walker, rolling  Ambulation - Level of Assistance: Contact guard assistance  Distance (ft): 105 Feet (ft)     On Anticoagulation?  YES  Aspirin  and Plavix      Pain Medications given:  Oxycodone and Celebrex                                   Lab Results   Component Value Date/Time    Glucose 129 (H) 04/18/2018 04:49 AM    Hemoglobin A1c 6.2 04/05/2018 01:01 PM    INR 1.0 04/05/2018 01:01 PM    HGB 8.3 (L) 04/19/2018 05:36 AM    HGB 8.2 (L) 04/18/2018 04:49 AM    HGB 11.8 04/05/2018 01:01 PM    HGB 11.9 08/12/2017 01:48 PM       Readmission Risks:  Score:         RECOMMENDATION     See After Visit Summary (AVS) for:  · Discharge instructions  · After [address]   · Medication Reconciliation          [facility] Orthopaedic Nurse Navigator  JOHN Cheung, RN-BC       Office  [phone]  Cell      [phone]  Fax [phone]  [email or web address]             . Phy

## 2018-04-20 NOTE — PROGRESS NOTES
Problem: Mobility Impaired (Adult and Pediatric)  Goal: *Acute Goals and Plan of Care (Insert Text)  Physical Therapy Goals  Initiated 4/17/2018    1. Patient will move from supine to sit and sit to supine , scoot up and down and roll side to side in bed with independence within 4 days. 2. Patient will perform sit to stand with independence within 4 days. 3. Patient will ambulate with independence for 100 feet with the least restrictive device within 4 days. 4. Patient will ascend/descend 4 stairs with 2 handrail(s) with supervision/set-up within 4 days. 5. Patient will verbalize and demonstrate understanding of anterior THR precautions per protocol within 4 days. 6. Patient will perform anterior THR home exercise program per protocol with independence within 4 days. physical Therapy TREATMENT  Patient: Azar Conklin (52 y.o. female)  Date: 4/20/2018  Diagnosis: LEFT HIP M87.052  Avascular necrosis of hip, left (HCC) <principal problem not specified>  Procedure(s) (LRB):  LEFT TOTAL HIP ARTHROPLASTY (LATEX FREE ROOM) (Left) 3 Days Post-Op  Precautions: Fall, WBAT  Chart, physical therapy assessment, plan of care and goals were reviewed. ASSESSMENT:  Patient continues to make good progress toward goals. Most difficulty noted with supine to sit transfers and needs extra time to complete. Sit to stand with CGA and cues for technique. Amb approx 105 feet with RW and CGA with slow, step-to pattern and no overt LOB but very slow raquel. Returned to room and positioned up in chair with needs in reach. Patient to DC to SNF this PM.  Will continue to follow should DC be delayed. Progression toward goals:  [x]      Improving appropriately and progressing toward goals  []      Improving slowly and progressing toward goals  []      Not making progress toward goals and plan of care will be adjusted     PLAN:  Patient continues to benefit from skilled intervention to address the above impairments.   Continue treatment per established plan of care. Discharge Recommendations:  Skilled Nursing Facility  Further Equipment Recommendations for Discharge:  TBD     SUBJECTIVE:   Patient stated I'm really looking forward to going to rehab today.     OBJECTIVE DATA SUMMARY:   Critical Behavior:  Neurologic State: Alert, Appropriate for age  Orientation Level: Oriented X4  Cognition: Appropriate decision making, Appropriate for age attention/concentration, Appropriate safety awareness, Follows commands  Safety/Judgement: Awareness of environment, Good awareness of safety precautions, Insight into deficits  Functional Mobility Training:  Bed Mobility:     Supine to Sit: Contact guard assistance; Additional time  Sit to Supine: Contact guard assistance; Additional time  Scooting: Supervision        Transfers:  Sit to Stand: Supervision  Stand to Sit: Supervision                             Balance:  Sitting: Intact  Standing: Impaired  Standing - Static: Good  Standing - Dynamic : Good  Ambulation/Gait Training:  Distance (ft): 105 Feet (ft)  Assistive Device: Gait belt;Walker, rolling  Ambulation - Level of Assistance: Contact guard assistance        Gait Abnormalities: Antalgic;Decreased step clearance; Step to gait     Left Side Weight Bearing: As tolerated        Speed/Juliana: Pace decreased (<100 feet/min); Slow  Step Length: Left shortened;Right shortened       Pain:  Pain Scale 1: Numeric (0 - 10)  Pain Intensity 1: 0              Activity Tolerance:   VSS  Please refer to the flowsheet for vital signs taken during this treatment.   After treatment:   [x] Patient left in no apparent distress sitting up in chair  [] Patient left in no apparent distress in bed  [x] Call bell left within reach  [x] Nursing notified  [] Caregiver present  [] Bed alarm activated    COMMUNICATION/COLLABORATION:   The patients plan of care was discussed with: Physical Therapist, Occupational Therapist and Registered Nurse    Eitan Wolfe PT, DPT   Time Calculation: 21 mins

## 2018-04-20 NOTE — PROGRESS NOTES
Chart reviewed. Patient has a bed at [facility] today. Waiting on discharge orders from MD. Scarlett Crowder, RN is aware. Patient prefers to transport via wheelchair van. CM arranged transport with [facility] ([phone]) for 2 PM. Cost of $55. Patient is aware of cost and has payment for transport. Discharge folder located on hard chart. RN to follow with discharge instructions, Kardex, and MAR. Rn to call report to #[phone].     LULI Romano/CRM

## 2018-04-20 NOTE — PROGRESS NOTES
Pt blood pressure 100/57, pulse 90, per order Metoprolol order hold for SBP<100 or HR<50, explained the order to pt and pt request to take medication.  Medication given

## 2018-04-20 NOTE — PROGRESS NOTES
Complaints: None   Events: one      GEN:  NAD. AOx3   ABD:  S/NT/ND   LLE:  Dressing C/D/I    5/5 motor    Calf nttp (Bilat)    Sensate all distribution to light touch    1+ dp/pt pulses, foot perfused      Lab Results   Component Value Date/Time    HGB 8.3 (L) 04/19/2018 05:36 AM    INR 1.0 04/05/2018 01:01 PM            POD #3 LEFT TOTAL HIP REPLACEMENT. Satisfactory progress.     DVT Prophylaxis:ASA Plavix  Weight Bearing: WBAT RLE   Pain Control: PRN oral narcotics  Anticipated Discharge Date: Today   Disposition:SNF

## 2018-05-25 ENCOUNTER — PATIENT OUTREACH (OUTPATIENT)
Dept: OTHER | Age: 83
End: 2018-05-25

## 2018-05-25 NOTE — PROGRESS NOTES
This note will not be viewable in [address] Post Discharge Follow-up contact after Joint Replacement    Patient discharged on 4/20/18  By  Swati Chu   following  left hip Arthroplasty. Spoke with patient today, who reports they are \" doing fine at home - I got wonderful therapy at the [facility], and I am walking without a cane or anything. \"  Denies Fever, Shortness of Breath or Chest Pain. Has had a follow up with Dr Omero Mi on 5/22. Discussed calling surgeon Dr Omero iM  for drainage, bleeding, swelling in operative extremity, fever or pain. Discussed calling PCP Dr Herbert Harden with other medical issues.

## 2018-06-28 ENCOUNTER — HOSPITAL ENCOUNTER (OUTPATIENT)
Dept: MAMMOGRAPHY | Age: 83
Discharge: HOME | End: 2018-06-28
Attending: INTERNAL MEDICINE
Payer: MEDICARE

## 2018-06-28 DIAGNOSIS — Z12.31 VISIT FOR SCREENING MAMMOGRAM: ICD-10-CM

## 2018-06-28 PROCEDURE — 77063 BREAST TOMOSYNTHESIS BI: CPT

## 2018-07-19 ENCOUNTER — HOSPITAL ENCOUNTER (OUTPATIENT)
Dept: PREADMISSION TESTING | Age: 83
Discharge: HOME | End: 2018-07-19
Payer: MEDICARE

## 2018-07-19 VITALS
WEIGHT: 150.25 LBS | HEART RATE: 80 BPM | BODY MASS INDEX: 29.5 KG/M2 | SYSTOLIC BLOOD PRESSURE: 107 MMHG | DIASTOLIC BLOOD PRESSURE: 68 MMHG | HEIGHT: 60 IN | TEMPERATURE: 98.7 F

## 2018-07-19 LAB
ANION GAP SERPL CALC-SCNC: 8 MMOL/L (ref 5–15)
APPEARANCE UR: CLEAR
BACTERIA URNS QL MICRO: NEGATIVE /HPF
BILIRUB UR QL: NEGATIVE
BUN SERPL-MCNC: 37 MG/DL (ref 6–20)
BUN/CREAT SERPL: 23 (ref 12–20)
CALCIUM SERPL-MCNC: 8.9 MG/DL (ref 8.5–10.1)
CHLORIDE SERPL-SCNC: 103 MMOL/L (ref 97–108)
CO2 SERPL-SCNC: 30 MMOL/L (ref 21–32)
COLOR UR: ABNORMAL
CREAT SERPL-MCNC: 1.61 MG/DL (ref 0.55–1.02)
EPITH CASTS URNS QL MICRO: ABNORMAL /LPF
ERYTHROCYTE [DISTWIDTH] IN BLOOD BY AUTOMATED COUNT: 15.2 % (ref 11.5–14.5)
EST. AVERAGE GLUCOSE BLD GHB EST-MCNC: 126 MG/DL
GLUCOSE SERPL-MCNC: 88 MG/DL (ref 65–100)
GLUCOSE UR STRIP.AUTO-MCNC: NEGATIVE MG/DL
HBA1C MFR BLD: 6 % (ref 4.2–6.3)
HCT VFR BLD AUTO: 35.4 % (ref 35–47)
HGB BLD-MCNC: 11.3 G/DL (ref 11.5–16)
HGB UR QL STRIP: NEGATIVE
HYALINE CASTS URNS QL MICRO: ABNORMAL /LPF (ref 0–5)
INR PPP: 1 (ref 0.9–1.1)
KETONES UR QL STRIP.AUTO: NEGATIVE MG/DL
LEUKOCYTE ESTERASE UR QL STRIP.AUTO: ABNORMAL
MCH RBC QN AUTO: 31.2 PG (ref 26–34)
MCHC RBC AUTO-ENTMCNC: 31.9 G/DL (ref 30–36.5)
MCV RBC AUTO: 97.8 FL (ref 80–99)
NITRITE UR QL STRIP.AUTO: NEGATIVE
NRBC # BLD: 0 K/UL (ref 0–0.01)
NRBC BLD-RTO: 0 PER 100 WBC
PH UR STRIP: 6 [PH] (ref 5–8)
PLATELET # BLD AUTO: 297 K/UL (ref 150–400)
PMV BLD AUTO: 10.4 FL (ref 8.9–12.9)
POTASSIUM SERPL-SCNC: 4.1 MMOL/L (ref 3.5–5.1)
PROT UR STRIP-MCNC: NEGATIVE MG/DL
PROTHROMBIN TIME: 10 SEC (ref 9–11.1)
RBC # BLD AUTO: 3.62 M/UL (ref 3.8–5.2)
RBC #/AREA URNS HPF: ABNORMAL /HPF (ref 0–5)
SODIUM SERPL-SCNC: 141 MMOL/L (ref 136–145)
SP GR UR REFRACTOMETRY: 1.01 (ref 1–1.03)
UA: UC IF INDICATED,UAUC: ABNORMAL
UROBILINOGEN UR QL STRIP.AUTO: 0.2 EU/DL (ref 0.2–1)
WBC # BLD AUTO: 8.5 K/UL (ref 3.6–11)
WBC URNS QL MICRO: ABNORMAL /HPF (ref 0–4)

## 2018-07-19 PROCEDURE — 86923 COMPATIBILITY TEST ELECTRIC: CPT | Performed by: ORTHOPAEDIC SURGERY

## 2018-07-19 PROCEDURE — 93005 ELECTROCARDIOGRAM TRACING: CPT

## 2018-07-19 PROCEDURE — 85610 PROTHROMBIN TIME: CPT | Performed by: ORTHOPAEDIC SURGERY

## 2018-07-19 PROCEDURE — 81001 URINALYSIS AUTO W/SCOPE: CPT | Performed by: ORTHOPAEDIC SURGERY

## 2018-07-19 PROCEDURE — 80048 BASIC METABOLIC PNL TOTAL CA: CPT | Performed by: ORTHOPAEDIC SURGERY

## 2018-07-19 PROCEDURE — 83036 HEMOGLOBIN GLYCOSYLATED A1C: CPT | Performed by: ORTHOPAEDIC SURGERY

## 2018-07-19 PROCEDURE — 86900 BLOOD TYPING SEROLOGIC ABO: CPT | Performed by: ORTHOPAEDIC SURGERY

## 2018-07-19 PROCEDURE — 85027 COMPLETE CBC AUTOMATED: CPT | Performed by: ORTHOPAEDIC SURGERY

## 2018-07-19 RX ORDER — BISMUTH SUBSALICYLATE 262 MG
1 TABLET,CHEWABLE ORAL DAILY
COMMUNITY
End: 2020-09-28

## 2018-07-20 LAB
ATRIAL RATE: 63 BPM
BACTERIA SPEC CULT: NORMAL
BACTERIA SPEC CULT: NORMAL
CALCULATED P AXIS, ECG09: 42 DEGREES
CALCULATED R AXIS, ECG10: -15 DEGREES
CALCULATED T AXIS, ECG11: 31 DEGREES
DIAGNOSIS, 93000: NORMAL
P-R INTERVAL, ECG05: 190 MS
Q-T INTERVAL, ECG07: 398 MS
QRS DURATION, ECG06: 80 MS
QTC CALCULATION (BEZET), ECG08: 407 MS
SERVICE CMNT-IMP: NORMAL
VENTRICULAR RATE, ECG03: 63 BPM

## 2018-07-27 ENCOUNTER — ANESTHESIA EVENT (OUTPATIENT)
Dept: SURGERY | Age: 83
DRG: 470 | End: 2018-07-27
Payer: MEDICARE

## 2018-07-27 NOTE — H&P
Izzydilcia Mike Location: [place] [address] [address] Patient #: [id number] : 1933  / Language: Pedro Irving / Lul Gwynn: Raghavendra Santamaria Female History of Present Illness The patient is a 80year old female presenting for a post-operative visit. Patient is 3 months (total left hip 18) postop procedure. There have been no post operative complications. Patient's symptoms are improved compared to preoperative. Weight-bearing status: 100 percent weightbearing. Patient has been compliant with post operative instructions. Note for \"Post-Operative\": Patient's main complaint today is her right knee.  She and I have discussed this in the past.  She has severe DJD of the right knee.  Been bothering her fingers.  She states that basically this is the only thing it's keeping her from becoming as active as she would like to be. Siena Foster is here today to schedule a right total knee.  She and I have discussed this in the past. Hector Pall is severe and activity limiting.  Feels along the medial joint line mostly. Problem List/Past Medical 
REVIEW OF SYSTEMS: Systems were reviewed by the provider.   
Osteoarthritis of left hip, unspecified osteoarthritis type (715.95  M16.12)   Weight above 97th percentile (V49.89  Z78.9)   Left lumbar radiculitis (724.4  M54.16)   
Primary osteoarthritis of right knee (715.16  M17.11)   
Status post total hip replacement, left (V43.64  D79.367)   Avascular necrosis of bone of left hip (733.42  M87.052)   Left hip pain (719.45  M25.552)   
Spondylolisthesis, lumbar region (738.4  M43.16)   Allergies Augmentin *PENICILLINS*   
Bactrim *ANTI-INFECTIVE AGENTS - MISC. *   
Doxycycline Hyclate *TETRACYCLINES*   
Cephalexin *CEPHALOSPORINS*   
Allergies Reconciled   
 
Social History Caffeine use   Occasionally. Current work status   Part-time. Exercise   cycling. Marital status   . No alcohol use   
No drug use   
Seat Belt Use   Always uses seat belts.  
Jeral Deter Exposure   Occasionally. Tobacco / smoke exposure   None. Tobacco use   Never smoker. Medication History TraMADol HCl  (50MG Tablet, 1 (one) Tablet Oral evey8-12 hrs prn, Taken starting 01/04/2018) [facility] Gabapentin  (300MG Capsule, 1 (one) Capsule Oral tid, Taken starting 01/04/2018) Active. (called [facility]) Allopurinol  (300MG Tablet, Oral) Active. Lisinopril  (10MG Tablet, Oral) Active. Bumetanide  (2MG Tablet, Oral) Active. Atorvastatin Calcium  (20MG Tablet, Oral) Active. Metoprolol Succinate ER  (50MG Tablet ER 24HR, Oral) Active. Vitamin D  (Oral) Specific strength unknown - Active. Plavix  (75MG Tablet, Oral) Active. Aspirin  (81MG Tablet, Oral) Active. Furosemide  (40MG Tablet, Oral) Active. Medications Reconciled  
 
Pregnancy / Birth History Pregnant   No. 
 
Past Surgical History Breast Mass; Local Excision   bilateral 
Cataract Surgery   bilateral 
Hysterectomy   non-cancerous: complete Other Heart Procedures   Stent Tubal Ligation   
 
Other Problems Allergic Urticaria   
Cerebrovascular Accident   
Heart disease   
Unspecified Diagnosis   
Lumbar spinal stenosis (724.02  M48.061)   Review of Systems General Not Present- Chills and Fatigue. Skin Not Present- Bruising, Pallor and Skin Color Changes. Respiratory Not Present- Cough and Difficulty Breathing. Cardiovascular Not Present- Chest Pain, Fainting / Blacking Out and Rapid Heart Rate. Musculoskeletal Not Present- Decreased Range of Motion, Joint Pain and Joint Swelling. Neurological Not Present- Dysesthesia, Paresthesias and Weakness In Extremities. Hematology Not Present- Abnormal Bleeding, Blood Clots and Petechiae. Vitals Weight: 140 lb   Height: 60 in  
Weight was reported by patient. Height was reported by patient. Body Surface Area: 1.6 m²   Body Mass Index: 27.34 kg/m²   
 
 
 
 
Physical Exam  
Musculoskeletal 
Global Assessment Examination of related systems reveals - well-developed, well-nourished, in no acute distress, alert and oriented x 3, no rashes or ulcers of bilateral upper and lower extremities, head or trunk, no generalized swelling or edema of extremities, no digital clubbing or cyanosis, neurovascularly intact globally with normal deep tendon reflexes and normal coordination. Gait and Station - normal gait and station. Right Lower Extremity - Note: Right hip has painless range of motion. Right knee varus alignment of about 5°. Fixed flexion contracture 5°. Knee flexes to 100°. Left Lower Extremity - Note: Left hip is moving without pain. Incision is well-healed. Assessment & Plan Primary osteoarthritis of right knee (715.16  M17.11) Impression: Severe advanced DJD of the right knee. Options were discussed and she wants to have her knee replaced. This is appropriate based on her level of symptoms. Current Plans Pt Education - How to access health information online: discussed with patient and provided information. Pt Education - Educational materials were provided.: discussed with patient and provided information. The risks of the surgery were discussed including infection, deep vein thrombosis, pulmonary embolus, cardiorespiratory complications, anesthetic risks, possible scar formation, cardiac compromise, stroke, death, leg length inequality, subluxation and possible implant failure. Understands the risks and wishes to proceed with surgical intervention. REVIEW OF SYSTEMS: Systems were reviewed by the provider.(V49.9) Signed by Castro Joseph MD

## 2018-07-30 ENCOUNTER — ANESTHESIA (OUTPATIENT)
Dept: SURGERY | Age: 83
DRG: 470 | End: 2018-07-30
Payer: MEDICARE

## 2018-07-30 ENCOUNTER — HOSPITAL ENCOUNTER (INPATIENT)
Age: 83
LOS: 4 days | Discharge: NURSING, REHAB OR LONG-TERM CARE | DRG: 470 | End: 2018-08-03
Attending: ORTHOPAEDIC SURGERY | Admitting: ORTHOPAEDIC SURGERY
Payer: MEDICARE

## 2018-07-30 DIAGNOSIS — M17.11 PRIMARY OSTEOARTHRITIS OF RIGHT KNEE: Primary | ICD-10-CM

## 2018-07-30 LAB
GLUCOSE BLD STRIP.AUTO-MCNC: 92 MG/DL (ref 65–100)
SERVICE CMNT-IMP: NORMAL

## 2018-07-30 PROCEDURE — 77030020788: Performed by: ORTHOPAEDIC SURGERY

## 2018-07-30 PROCEDURE — 74011250636 HC RX REV CODE- 250/636: Performed by: PHYSICIAN ASSISTANT

## 2018-07-30 PROCEDURE — 77030018846 HC SOL IRR STRL H20 ICUM -A: Performed by: ORTHOPAEDIC SURGERY

## 2018-07-30 PROCEDURE — 77030008467 HC STPLR SKN COVD -B: Performed by: ORTHOPAEDIC SURGERY

## 2018-07-30 PROCEDURE — 74011250636 HC RX REV CODE- 250/636

## 2018-07-30 PROCEDURE — 74011250636 HC RX REV CODE- 250/636: Performed by: ORTHOPAEDIC SURGERY

## 2018-07-30 PROCEDURE — 3E0T3BZ INTRODUCTION OF ANESTHETIC AGENT INTO PERIPHERAL NERVES AND PLEXI, PERCUTANEOUS APPROACH: ICD-10-PCS | Performed by: ANESTHESIOLOGY

## 2018-07-30 PROCEDURE — 77030035236 HC SUT PDS STRATFX BARB J&J -B: Performed by: ORTHOPAEDIC SURGERY

## 2018-07-30 PROCEDURE — 74011250637 HC RX REV CODE- 250/637: Performed by: PHYSICIAN ASSISTANT

## 2018-07-30 PROCEDURE — C1776 JOINT DEVICE (IMPLANTABLE): HCPCS | Performed by: ORTHOPAEDIC SURGERY

## 2018-07-30 PROCEDURE — 77030028224 HC PDNG CST BSNM -A: Performed by: ORTHOPAEDIC SURGERY

## 2018-07-30 PROCEDURE — 77030014125 HC TY EPDRL BBMI -B: Performed by: ANESTHESIOLOGY

## 2018-07-30 PROCEDURE — 76210000006 HC OR PH I REC 0.5 TO 1 HR: Performed by: ORTHOPAEDIC SURGERY

## 2018-07-30 PROCEDURE — 77030010783 HC BOWL MX BN CEM J&J -B: Performed by: ORTHOPAEDIC SURGERY

## 2018-07-30 PROCEDURE — 76060000036 HC ANESTHESIA 2.5 TO 3 HR: Performed by: ORTHOPAEDIC SURGERY

## 2018-07-30 PROCEDURE — 74011250636 HC RX REV CODE- 250/636: Performed by: ANESTHESIOLOGY

## 2018-07-30 PROCEDURE — 77030003671 HC NDL SPN HAVL -B: Performed by: ANESTHESIOLOGY

## 2018-07-30 PROCEDURE — 77030031139 HC SUT VCRL2 J&J -A: Performed by: ORTHOPAEDIC SURGERY

## 2018-07-30 PROCEDURE — 64447 NJX AA&/STRD FEMORAL NRV IMG: CPT | Performed by: ANESTHESIOLOGY

## 2018-07-30 PROCEDURE — 82962 GLUCOSE BLOOD TEST: CPT

## 2018-07-30 PROCEDURE — 0SRC0J9 REPLACEMENT OF RIGHT KNEE JOINT WITH SYNTHETIC SUBSTITUTE, CEMENTED, OPEN APPROACH: ICD-10-PCS | Performed by: ORTHOPAEDIC SURGERY

## 2018-07-30 PROCEDURE — 77030012935 HC DRSG AQUACEL BMS -B: Performed by: ORTHOPAEDIC SURGERY

## 2018-07-30 PROCEDURE — 77030006822 HC BLD SAW SAG BRSM -B: Performed by: ORTHOPAEDIC SURGERY

## 2018-07-30 PROCEDURE — 77030034850: Performed by: ORTHOPAEDIC SURGERY

## 2018-07-30 PROCEDURE — 74011000250 HC RX REV CODE- 250: Performed by: ORTHOPAEDIC SURGERY

## 2018-07-30 PROCEDURE — 65270000029 HC RM PRIVATE

## 2018-07-30 PROCEDURE — C1713 ANCHOR/SCREW BN/BN,TIS/BN: HCPCS | Performed by: ORTHOPAEDIC SURGERY

## 2018-07-30 PROCEDURE — 77030003601 HC NDL NRV BLK BBMI -A

## 2018-07-30 PROCEDURE — 76010000171 HC OR TIME 2 TO 2.5 HR INTENSV-TIER 1: Performed by: ORTHOPAEDIC SURGERY

## 2018-07-30 PROCEDURE — 77030038020 HC MANFLD NEPTUNE STRY -B: Performed by: ORTHOPAEDIC SURGERY

## 2018-07-30 PROCEDURE — 77030000032 HC CUF TRNQT ZIMM -B: Performed by: ORTHOPAEDIC SURGERY

## 2018-07-30 PROCEDURE — 77030020782 HC GWN BAIR PAWS FLX 3M -B

## 2018-07-30 PROCEDURE — 77030011640 HC PAD GRND REM COVD -A: Performed by: ORTHOPAEDIC SURGERY

## 2018-07-30 PROCEDURE — C9290 INJ, BUPIVACAINE LIPOSOME: HCPCS | Performed by: ORTHOPAEDIC SURGERY

## 2018-07-30 PROCEDURE — 77030018822 HC SLV COMPR FT COVD -B

## 2018-07-30 PROCEDURE — 77030018836 HC SOL IRR NACL ICUM -A: Performed by: ORTHOPAEDIC SURGERY

## 2018-07-30 PROCEDURE — 74011000250 HC RX REV CODE- 250

## 2018-07-30 DEVICE — KIT TKR CEM FLX: Type: IMPLANTABLE DEVICE | Site: KNEE | Status: FUNCTIONAL

## 2018-07-30 DEVICE — CEMENT BNE 40GM FULL DOSE PMMA W/ GENT HI VISC RADPQ LNG: Type: IMPLANTABLE DEVICE | Site: KNEE | Status: FUNCTIONAL

## 2018-07-30 DEVICE — PAT NXGN 35X9.0 POLYETH --: Type: IMPLANTABLE DEVICE | Site: KNEE | Status: FUNCTIONAL

## 2018-07-30 DEVICE — IMPLANTABLE DEVICE: Type: IMPLANTABLE DEVICE | Site: KNEE | Status: FUNCTIONAL

## 2018-07-30 DEVICE — PLUG STEM TIV FLX TAPR FOR MG II ST BNE SCR NXGN: Type: IMPLANTABLE DEVICE | Site: KNEE | Status: FUNCTIONAL

## 2018-07-30 DEVICE — Z DUP USE 2503010 COMPONENT ARTC SURF CR 3-4 UNIV 42X56X14 MM KNEE CONSTRN YEL: Type: IMPLANTABLE DEVICE | Site: KNEE | Status: FUNCTIONAL

## 2018-07-30 RX ORDER — SODIUM CHLORIDE 0.9 % (FLUSH) 0.9 %
5-10 SYRINGE (ML) INJECTION EVERY 8 HOURS
Status: DISCONTINUED | OUTPATIENT
Start: 2018-07-30 | End: 2018-07-30 | Stop reason: HOSPADM

## 2018-07-30 RX ORDER — CEFAZOLIN SODIUM/WATER 2 G/20 ML
2 SYRINGE (ML) INTRAVENOUS ONCE
Status: DISCONTINUED | OUTPATIENT
Start: 2018-07-30 | End: 2018-07-30 | Stop reason: HOSPADM

## 2018-07-30 RX ORDER — FACIAL-BODY WIPES
10 EACH TOPICAL DAILY PRN
Status: DISCONTINUED | OUTPATIENT
Start: 2018-08-01 | End: 2018-08-03 | Stop reason: HOSPADM

## 2018-07-30 RX ORDER — CLOPIDOGREL BISULFATE 75 MG/1
75 TABLET ORAL DAILY
Status: DISCONTINUED | OUTPATIENT
Start: 2018-07-31 | End: 2018-08-03 | Stop reason: HOSPADM

## 2018-07-30 RX ORDER — TRAMADOL HYDROCHLORIDE 50 MG/1
50 TABLET ORAL
Status: DISCONTINUED | OUTPATIENT
Start: 2018-07-30 | End: 2018-08-03 | Stop reason: HOSPADM

## 2018-07-30 RX ORDER — BUMETANIDE 1 MG/1
2 TABLET ORAL DAILY
Status: DISCONTINUED | OUTPATIENT
Start: 2018-07-31 | End: 2018-07-31

## 2018-07-30 RX ORDER — SODIUM CHLORIDE 0.9 % (FLUSH) 0.9 %
5-10 SYRINGE (ML) INJECTION EVERY 8 HOURS
Status: DISCONTINUED | OUTPATIENT
Start: 2018-07-31 | End: 2018-08-03 | Stop reason: HOSPADM

## 2018-07-30 RX ORDER — POLYETHYLENE GLYCOL 3350 17 G/17G
17 POWDER, FOR SOLUTION ORAL DAILY
Status: DISCONTINUED | OUTPATIENT
Start: 2018-07-31 | End: 2018-08-03 | Stop reason: HOSPADM

## 2018-07-30 RX ORDER — FENTANYL CITRATE 50 UG/ML
25 INJECTION, SOLUTION INTRAMUSCULAR; INTRAVENOUS
Status: DISCONTINUED | OUTPATIENT
Start: 2018-07-30 | End: 2018-07-30 | Stop reason: HOSPADM

## 2018-07-30 RX ORDER — MORPHINE SULFATE 10 MG/ML
2 INJECTION, SOLUTION INTRAMUSCULAR; INTRAVENOUS
Status: DISCONTINUED | OUTPATIENT
Start: 2018-07-30 | End: 2018-07-30 | Stop reason: HOSPADM

## 2018-07-30 RX ORDER — ACETAMINOPHEN 500 MG
1000 TABLET ORAL EVERY 6 HOURS
Status: DISCONTINUED | OUTPATIENT
Start: 2018-07-30 | End: 2018-08-03 | Stop reason: HOSPADM

## 2018-07-30 RX ORDER — BUPIVACAINE HYDROCHLORIDE 5 MG/ML
INJECTION, SOLUTION EPIDURAL; INTRACAUDAL AS NEEDED
Status: DISCONTINUED | OUTPATIENT
Start: 2018-07-30 | End: 2018-07-30 | Stop reason: HOSPADM

## 2018-07-30 RX ORDER — LIDOCAINE HYDROCHLORIDE 10 MG/ML
0.1 INJECTION, SOLUTION EPIDURAL; INFILTRATION; INTRACAUDAL; PERINEURAL AS NEEDED
Status: DISCONTINUED | OUTPATIENT
Start: 2018-07-30 | End: 2018-07-30 | Stop reason: HOSPADM

## 2018-07-30 RX ORDER — PROPOFOL 10 MG/ML
INJECTION, EMULSION INTRAVENOUS AS NEEDED
Status: DISCONTINUED | OUTPATIENT
Start: 2018-07-30 | End: 2018-07-30 | Stop reason: HOSPADM

## 2018-07-30 RX ORDER — PROPOFOL 10 MG/ML
INJECTION, EMULSION INTRAVENOUS
Status: DISCONTINUED | OUTPATIENT
Start: 2018-07-30 | End: 2018-07-30 | Stop reason: HOSPADM

## 2018-07-30 RX ORDER — ASPIRIN 81 MG/1
81 TABLET ORAL DAILY
Status: DISCONTINUED | OUTPATIENT
Start: 2018-07-31 | End: 2018-08-03 | Stop reason: HOSPADM

## 2018-07-30 RX ORDER — DIPHENHYDRAMINE HYDROCHLORIDE 50 MG/ML
12.5 INJECTION, SOLUTION INTRAMUSCULAR; INTRAVENOUS
Status: ACTIVE | OUTPATIENT
Start: 2018-07-30 | End: 2018-07-31

## 2018-07-30 RX ORDER — GABAPENTIN 300 MG/1
300 CAPSULE ORAL 2 TIMES DAILY
Status: DISCONTINUED | OUTPATIENT
Start: 2018-07-30 | End: 2018-08-03 | Stop reason: HOSPADM

## 2018-07-30 RX ORDER — ONDANSETRON 2 MG/ML
4 INJECTION INTRAMUSCULAR; INTRAVENOUS
Status: ACTIVE | OUTPATIENT
Start: 2018-07-30 | End: 2018-07-31

## 2018-07-30 RX ORDER — FENTANYL CITRATE 50 UG/ML
10 INJECTION, SOLUTION INTRAMUSCULAR; INTRAVENOUS
Status: DISCONTINUED | OUTPATIENT
Start: 2018-07-30 | End: 2018-08-01

## 2018-07-30 RX ORDER — NALOXONE HYDROCHLORIDE 0.4 MG/ML
0.4 INJECTION, SOLUTION INTRAMUSCULAR; INTRAVENOUS; SUBCUTANEOUS AS NEEDED
Status: DISCONTINUED | OUTPATIENT
Start: 2018-07-30 | End: 2018-08-03 | Stop reason: HOSPADM

## 2018-07-30 RX ORDER — SODIUM CHLORIDE 0.9 % (FLUSH) 0.9 %
5-10 SYRINGE (ML) INJECTION AS NEEDED
Status: DISCONTINUED | OUTPATIENT
Start: 2018-07-30 | End: 2018-07-30 | Stop reason: HOSPADM

## 2018-07-30 RX ORDER — NITROGLYCERIN 0.4 MG/1
0.4 TABLET SUBLINGUAL AS NEEDED
Status: DISCONTINUED | OUTPATIENT
Start: 2018-07-30 | End: 2018-08-03 | Stop reason: HOSPADM

## 2018-07-30 RX ORDER — SODIUM CHLORIDE 0.9 % (FLUSH) 0.9 %
5-10 SYRINGE (ML) INJECTION AS NEEDED
Status: DISCONTINUED | OUTPATIENT
Start: 2018-07-30 | End: 2018-08-03 | Stop reason: HOSPADM

## 2018-07-30 RX ORDER — AMOXICILLIN 250 MG
1 CAPSULE ORAL 2 TIMES DAILY
Status: DISCONTINUED | OUTPATIENT
Start: 2018-07-31 | End: 2018-08-03 | Stop reason: HOSPADM

## 2018-07-30 RX ORDER — METOPROLOL SUCCINATE 50 MG/1
50 TABLET, EXTENDED RELEASE ORAL DAILY
Status: DISCONTINUED | OUTPATIENT
Start: 2018-07-31 | End: 2018-07-31

## 2018-07-30 RX ORDER — HYDROMORPHONE HYDROCHLORIDE 1 MG/ML
0.5 INJECTION, SOLUTION INTRAMUSCULAR; INTRAVENOUS; SUBCUTANEOUS
Status: DISCONTINUED | OUTPATIENT
Start: 2018-07-30 | End: 2018-07-30 | Stop reason: HOSPADM

## 2018-07-30 RX ORDER — MIDAZOLAM HYDROCHLORIDE 1 MG/ML
1 INJECTION, SOLUTION INTRAMUSCULAR; INTRAVENOUS AS NEEDED
Status: DISCONTINUED | OUTPATIENT
Start: 2018-07-30 | End: 2018-07-30 | Stop reason: HOSPADM

## 2018-07-30 RX ORDER — SODIUM CHLORIDE, SODIUM LACTATE, POTASSIUM CHLORIDE, CALCIUM CHLORIDE 600; 310; 30; 20 MG/100ML; MG/100ML; MG/100ML; MG/100ML
50 INJECTION, SOLUTION INTRAVENOUS CONTINUOUS
Status: DISCONTINUED | OUTPATIENT
Start: 2018-07-30 | End: 2018-07-30 | Stop reason: HOSPADM

## 2018-07-30 RX ORDER — ONDANSETRON 2 MG/ML
4 INJECTION INTRAMUSCULAR; INTRAVENOUS AS NEEDED
Status: DISCONTINUED | OUTPATIENT
Start: 2018-07-30 | End: 2018-07-30 | Stop reason: HOSPADM

## 2018-07-30 RX ORDER — CEFAZOLIN SODIUM 1 G/3ML
INJECTION, POWDER, FOR SOLUTION INTRAMUSCULAR; INTRAVENOUS AS NEEDED
Status: DISCONTINUED | OUTPATIENT
Start: 2018-07-30 | End: 2018-07-30 | Stop reason: HOSPADM

## 2018-07-30 RX ORDER — PROCHLORPERAZINE MALEATE 5 MG
5 TABLET ORAL
Status: DISCONTINUED | OUTPATIENT
Start: 2018-07-30 | End: 2018-08-03 | Stop reason: HOSPADM

## 2018-07-30 RX ORDER — ROPIVACAINE HYDROCHLORIDE 5 MG/ML
30 INJECTION, SOLUTION EPIDURAL; INFILTRATION; PERINEURAL AS NEEDED
Status: DISCONTINUED | OUTPATIENT
Start: 2018-07-30 | End: 2018-07-30 | Stop reason: HOSPADM

## 2018-07-30 RX ORDER — CEFAZOLIN SODIUM/WATER 2 G/20 ML
2 SYRINGE (ML) INTRAVENOUS EVERY 8 HOURS
Status: COMPLETED | OUTPATIENT
Start: 2018-07-31 | End: 2018-07-31

## 2018-07-30 RX ORDER — FENTANYL CITRATE 50 UG/ML
50 INJECTION, SOLUTION INTRAMUSCULAR; INTRAVENOUS AS NEEDED
Status: DISCONTINUED | OUTPATIENT
Start: 2018-07-30 | End: 2018-07-30 | Stop reason: HOSPADM

## 2018-07-30 RX ORDER — SODIUM CHLORIDE 9 MG/ML
125 INJECTION, SOLUTION INTRAVENOUS CONTINUOUS
Status: DISPENSED | OUTPATIENT
Start: 2018-07-30 | End: 2018-07-31

## 2018-07-30 RX ORDER — ALLOPURINOL 100 MG/1
300 TABLET ORAL DAILY
Status: DISCONTINUED | OUTPATIENT
Start: 2018-07-31 | End: 2018-07-31

## 2018-07-30 RX ORDER — ACETAMINOPHEN 500 MG
1000 TABLET ORAL ONCE
Status: DISCONTINUED | OUTPATIENT
Start: 2018-07-30 | End: 2018-07-30 | Stop reason: HOSPADM

## 2018-07-30 RX ORDER — OXYCODONE HYDROCHLORIDE 5 MG/1
5-10 TABLET ORAL
Status: DISCONTINUED | OUTPATIENT
Start: 2018-07-30 | End: 2018-08-03 | Stop reason: HOSPADM

## 2018-07-30 RX ADMIN — ACETAMINOPHEN 1000 MG: 500 TABLET, FILM COATED ORAL at 20:25

## 2018-07-30 RX ADMIN — FENTANYL CITRATE 50 MCG: 50 INJECTION, SOLUTION INTRAMUSCULAR; INTRAVENOUS at 15:08

## 2018-07-30 RX ADMIN — PROPOFOL 70 MCG/KG/MIN: 10 INJECTION, EMULSION INTRAVENOUS at 16:06

## 2018-07-30 RX ADMIN — SODIUM CHLORIDE 125 ML/HR: 900 INJECTION, SOLUTION INTRAVENOUS at 18:59

## 2018-07-30 RX ADMIN — GABAPENTIN 300 MG: 300 CAPSULE ORAL at 20:25

## 2018-07-30 RX ADMIN — BUPIVACAINE HYDROCHLORIDE 10 MG: 5 INJECTION, SOLUTION EPIDURAL; INTRACAUDAL at 16:04

## 2018-07-30 RX ADMIN — Medication 2 G: at 23:31

## 2018-07-30 RX ADMIN — SODIUM CHLORIDE, SODIUM LACTATE, POTASSIUM CHLORIDE, AND CALCIUM CHLORIDE 50 ML/HR: 600; 310; 30; 20 INJECTION, SOLUTION INTRAVENOUS at 14:39

## 2018-07-30 RX ADMIN — PROPOFOL 40 MG: 10 INJECTION, EMULSION INTRAVENOUS at 16:06

## 2018-07-30 RX ADMIN — MIDAZOLAM HYDROCHLORIDE 1 MG: 1 INJECTION, SOLUTION INTRAMUSCULAR; INTRAVENOUS at 15:08

## 2018-07-30 RX ADMIN — CEFAZOLIN SODIUM 2 G: 1 INJECTION, POWDER, FOR SOLUTION INTRAMUSCULAR; INTRAVENOUS at 16:16

## 2018-07-30 RX ADMIN — OXYCODONE HYDROCHLORIDE 5 MG: 5 TABLET ORAL at 23:28

## 2018-07-30 NOTE — IP AVS SNAPSHOT
[address] 
[phone] Patient: Arianne Mora MRN: [id number] JBL:85/98/9200 A check ahmet indicates which time of day the medication should be taken. My Medications START taking these medications Instructions Each Dose to Equal  
 Morning Noon Evening Bedtime  
 oxyCODONE IR 5 mg immediate release tablet Commonly known as:  Adriana Knox Your last dose was: Your next dose is: Take 1-2 Tabs by mouth every four (4) hours as needed. Max Daily Amount: 60 mg.  
 5-10 mg CONTINUE taking these medications Instructions Each Dose to Equal  
 Morning Noon Evening Bedtime  
 allopurinol 300 mg tablet Commonly known as:  Brianne Whitaker Your last dose was: Your next dose is: Take 1/2 tablet by mouth daily. For tophaceous gout  
     
   
   
   
  
 aspirin delayed-release 81 mg tablet Your last dose was: Your next dose is: Take 81 mg by mouth daily. 81 mg  
    
   
   
   
  
 atorvastatin 20 mg tablet Commonly known as:  LIPITOR Your last dose was: Your next dose is: Take 20 mg by mouth nightly. 20 mg  
    
   
   
   
  
 bumetanide 2 mg tablet Commonly known as:  Gia Oas Your last dose was: Your next dose is: Take 2 mg by mouth daily. 2 mg  
    
   
   
   
  
 gabapentin 300 mg capsule Commonly known as:  NEURONTIN Your last dose was: Your next dose is: TAKE 1 CAPSULE BY MOUTH THREE TIMES A DAY  
     
   
   
   
  
 lisinopril 10 mg tablet Commonly known as:  Ora Bee Your last dose was: Your next dose is: Take 10 mg by mouth. 10 mg  
    
   
   
   
  
 metoprolol succinate 50 mg XL tablet Commonly known as:  TOPROL-XL Your last dose was: Your next dose is: TAKE 1&1/2 TABLETS BY MOUTH EVERY HS  
     
   
   
   
  
 multivitamin tablet Commonly known as:  ONE A DAY Your last dose was: Your next dose is: Take 1 Tab by mouth daily. 1 Tab  
    
   
   
   
  
 nitroglycerin 0.4 mg SL tablet Commonly known as:  NITROSTAT Your last dose was: Your next dose is:    
   
   
 every five (5) minutes as needed. PLAVIX 75 mg Tab Generic drug:  clopidogrel Your last dose was: Your next dose is: Take 75 mg by mouth daily. 75 mg  
    
   
   
   
  
 triamcinolone acetonide 0.025 % topical cream  
Commonly known as:  KENALOG Your last dose was: Your next dose is:    
   
   
 Apply  to affected area. STOP taking these medications   
 traMADol 50 mg tablet Commonly known as:  ULTRAM  
   
  
 VITAMIN D3 1,000 unit Cap Generic drug:  cholecalciferol Where to Get Your Medications Information on where to get these meds will be given to you by the nurse or doctor. ! Ask your nurse or doctor about these medications  
  oxyCODONE IR 5 mg immediate release tablet

## 2018-07-30 NOTE — ANESTHESIA PROCEDURE NOTES
Spinal Block Performed by: Pearl Ayala Authorized by: Jasvir Bhat Pre-procedure: Indications: at surgeon's request and primary anesthetic  Preanesthetic Checklist: patient identified, risks and benefits discussed, anesthesia consent, site marked, patient being monitored and timeout performed Spinal Block:  
Patient Position:  Seated Prep Region:  Lumbar Prep: chlorhexidine Location:  L2-3 Technique:  Single shot Local:  Lidocaine 1% Local Dose (mL):  2 Needle:  
Needle Type:  Pencan Needle Gauge:  24 G Attempts:  2 Events: CSF confirmed, no blood with aspiration and no paresthesia Assessment: 
Insertion:  Uncomplicated Patient tolerance:  Patient tolerated the procedure well with no immediate complications

## 2018-07-30 NOTE — ANESTHESIA PROCEDURE NOTES
Peripheral Block Start time: 7/30/2018 3:00 PM 
End time: 7/30/2018 3:09 PM 
Performed by: Mikey Good Authorized by: Mikey Good Pre-procedure: Indications: at surgeon's request, post-op pain management and procedure for pain Preanesthetic Checklist: patient identified, risks and benefits discussed, site marked, timeout performed, anesthesia consent given and patient being monitored Timeout Time: 15:00 Block Type:  
Block Type: Adductor canal 
Laterality:  Right Monitoring:  Standard ASA monitoring, responsive to questions, oxygen, continuous pulse ox, frequent vital sign checks and heart rate Injection Technique:  Single shot Procedures: ultrasound guided Patient Position: right lateral decubitus Prep: chlorhexidine Location:  Mid thigh Needle Type:  Stimuplex Needle Gauge:  21 G Needle Localization:  Ultrasound guidance Medication Injected:  0.5% 
ropivacaine Volume (mL):  30 
 
Assessment: 
Number of attempts:  1 Injection Assessment:  Incremental injection every 5 mL, negative aspiration for CSF, no paresthesia, ultrasound image on chart, negative aspiration for blood, no intravascular symptoms and local visualized surrounding nerve on ultrasound

## 2018-07-30 NOTE — OP NOTES
Name: Dell Rodrigues  MRN:  [id number]  : 1933  Age:  80 y.o. Surgery Date: 2018      OPERATIVE REPORT - RIGHT TOTAL KNEE REPLACEMENT    PREOPERATIVE DIAGNOSIS: Osteoarthritis, right knee. POSTOPERATIVE DIAGNOSIS: Osteoarthritis, right knee. PROCEDURE PERFORMED: Right total knee arthroplasty. SURGEON: Phil Bryson MD    FIRST ASSISTANT: Emmanuel Kennedy PA-C    ANESTHESIA: Spinal    PRE-OP ANTIBIOTIC: Ancef 2g    COMPLICATIONS: None. ESTIMATED BLOOD LOSS: 50 mL. SPECIMENS REMOVED: None. COMPONENTS IMPLANTED:   Implant Name Type Inv. Item Serial No.  Lot No. LRB No. Used Action   CEMENT BNE Danita Leyland 40GM -- SMARTSET - [id number]  CEMENT  [facility] -- SMARTSET  [employer] [facility] [employer] [id number] Right 1 Implanted   CEMENT BNE [employer] GHV 40GM -- SMARTSET - [id number]  CEMENT  [address] V 40GM -- SMARTSET  [facility] [id number] Right 1 Implanted   SIZE 3 STEMMED TIBIAL COMPONENT      [employer] [id number] Right 1 Implanted   PLUG STEM TAPR NEXGEN --  - [id number]  PLUG STEM TAPR NEXGEN --   [employer] [id number] Right 1 Implanted   PAT NXGN 35X9.0 POLYETH --  - [id number]  PAT NXGN 35X9.0 POLYETH --   [employer] [id number] Right 1 Implanted   INSERT TIB ANTR AC SZ 3-4 14MM -- NEXGEN YEL - [id number]  INSERT TIB ANTR AC SZ 3-4 14MM -- NEXGEN YEL  Thurl Orick [id number] Right 1 Implanted   COMPNT FEM PC NXGN CR-FLX SZ E --  - [id number]   COMPNT FEM PC NXGN CR-FLX SZ E --    [employer] [id number] Right 1 Implanted       INDICATIONS: The patient is an 80 yrs female with progressive debilitating right knee pain due to severe osteoarthritis. Symptoms have progressed despite comprehensive conservative treatment and the patient presents for right total knee replacement. Risks, benefits, alternatives of the procedure were reviewed  in detail and the patient elects to proceed. The patient understands the risk for perioperative medical complications. DESCRIPTION OF PROCEDURE: Spinal anesthesia was initiated. Preoperative dose of antibiotic was given. Cartwright catheter was placed. The right lower extremity was prepped and draped in the usual sterile fashion. The skin was covered with Ioban occlusive dressing. The right lower extremity was exsanguinated. A medial parapatellar incision was made to the left knee. The right knee was exposed and the distal femur was cut at 5 degrees distal femoral valgus. Femur was sized for a size E. An AP cutting block was placed, rotated based on bony landmarks. Femoral cuts were completed for a size E. The tibia was subluxed and a neutral varus/valgus proximal tibial cut was made matching the patient's slope. The meniscal remnants were removed. The posterior osteophytes were removed from the femur. Gaps were examined. The flexion and extension gaps were 14 mm. The femur was finished for a E, tibia for a 3. Trials were placed. Patella was cut and a 35 mm trial was fitted . The knee tracked well with all trial implants. The trials were then removed. Bony surfaces were drilled, lavaged, and dried. All components were cemented. Excess cement was removed. A 14 polyethylene component was placed. After the cement had fully cured, the knee was ranged and  irrigated copiously with pulsatile lavage. All surrounding soft tissues were infiltrated with local anesthetic. The arthrotomy was closed with #2 Vicryl sutures and 0 Vicryl sutures. Skin and subcutaneous tissues were irrigated and closed in standard fashion. Sterile dressing was applied. There were no complications. The procedure was a RIGHT TOTAL KNEE REPLACEMENT. A Clotilde total knee construct was utilized. No specimens were obtained/sent. Ashlee Le PA-C was of vital assisted throughout the duration of the procedure. The patient was transferred to the recovery room in stable condition. Varus Release.       Juan Perez MD

## 2018-07-30 NOTE — PERIOP NOTES
TRANSFER - OUT REPORT: 
 
Verbal report given to Stefani Burns RN on Aida Zabala  being transferred to [phone] for routine post - op Report consisted of patients Situation, Background, Assessment and  
Recommendations(SBAR). Time Pre op antibiotic given:1616 Anesthesia Stop time: 2581 Cartwright Present on Transfer to floor:yes Order for Cartwright on Chart:yes Discharge Prescriptions with Chart:no Information from the following report(s) SBAR, OR Summary, Intake/Output and MAR was reviewed with the receiving nurse. Opportunity for questions and clarification was provided. Is the patient on 02? NO 
     L/Min 0 Other 0 Is the patient on a monitor? NO Is the nurse transporting with the patient? NO Surgical Waiting Area notified of patient's transfer from PACU? YES The following personal items collected during your admission accompanied patient upon transfer:  
Dental Appliance:   
Vision:   
Hearing Aid:   
Jewelry:   
Clothing: Clothing:  (bag of clothes returned to pt inpacu) Other Valuables:   
Valuables sent to safe:

## 2018-07-30 NOTE — PROGRESS NOTES
TRANSFER - IN REPORT: 
 
AN AMAZING Verbal report received from Margarita(name) on Rossy Aldridge  being received from [facility]) for routine post - op Report consisted of patients Situation, Background, Assessment and  
Recommendations(SBAR). Information from the following report(s) SBAR, OR Summary, Procedure Summary and Intake/Output was reviewed with the receiving nurse. Opportunity for questions and clarification was provided. Assessment completed upon patients arrival to unit and care assumed. Primary Nurse Ney Li and Tru Lambert RN performed a dual skin assessment on this patient Impairment noted- see wound doc flow sheet Esau score is 21 Bedside shift change report given to [place] (oncoming nurse) by Mitch Weiss (offgoing nurse). Report included the following information SBAR, Kardex, Intake/Output and MAR.

## 2018-07-30 NOTE — ANESTHESIA PREPROCEDURE EVALUATION
Anesthetic History Review of Systems / Medical History Patient summary reviewed, nursing notes reviewed and pertinent labs reviewed Pulmonary Neuro/Psych CVA TIA Cardiovascular Hypertension CHF: NYHA Classification II 
 
CAD and cardiac stents Comments: EF 35-40% GI/Hepatic/Renal 
  
 
 
 
 
 
 Endo/Other Arthritis Other Findings Physical Exam 
 
Airway Mallampati: II 
TM Distance: > 6 cm Neck ROM: normal range of motion Mouth opening: Normal 
 
 Cardiovascular Rhythm: regular Rate: normal 
 
 
 
 Dental 
No notable dental hx Pulmonary Breath sounds clear to auscultation Abdominal 
 
 
 
 Other Findings Anesthetic Plan ASA: 2 Anesthesia type: spinal 
 
 
 
 
Induction: Intravenous Anesthetic plan and risks discussed with: Patient

## 2018-07-30 NOTE — IP AVS SNAPSHOT
1111 [facility] [address] [place] [address] 
[phone] Patient: Black Martin MRN: [id number] About your hospitalization You were admitted on:  July 30, 2018 You last received care in the:  [facility] You were discharged on:  August 3, 2018 Why you were hospitalized Your primary diagnosis was:  Osteoarthritis Of Right Knee Follow-up Information Follow up With Details Comments Contact Info The [address] [place] [address]   309 [facility] [address] [phone] [place] Grad, [address] 
[phone] Discharge Orders None A check ahmet indicates which time of day the medication should be taken. My Medications START taking these medications Instructions Each Dose to Equal  
 Morning Noon Evening Bedtime  
 oxyCODONE IR 5 mg immediate release tablet Commonly known as:  Keagan Nguyễn Your last dose was: Your next dose is: Take 1-2 Tabs by mouth every four (4) hours as needed. Max Daily Amount: 60 mg.  
 5-10 mg CONTINUE taking these medications Instructions Each Dose to Equal  
 Morning Noon Evening Bedtime  
 allopurinol 300 mg tablet Commonly known as:  Alexandra Irving Your last dose was: Your next dose is: Take 1/2 tablet by mouth daily. For tophaceous gout  
     
   
   
   
  
 aspirin delayed-release 81 mg tablet Your last dose was: Your next dose is: Take 81 mg by mouth daily. 81 mg  
    
   
   
   
  
 atorvastatin 20 mg tablet Commonly known as:  LIPITOR Your last dose was: Your next dose is: Take 20 mg by mouth nightly. 20 mg  
    
   
   
   
  
 bumetanide 2 mg tablet Commonly known as:  Ashley Townsend Your last dose was: Your next dose is: Take 2 mg by mouth daily. 2 mg  
    
   
   
   
  
 gabapentin 300 mg capsule Commonly known as:  NEURONTIN Your last dose was: Your next dose is: TAKE 1 CAPSULE BY MOUTH THREE TIMES A DAY  
     
   
   
   
  
 lisinopril 10 mg tablet Commonly known as:  Ora Bee Your last dose was: Your next dose is: Take 10 mg by mouth. 10 mg  
    
   
   
   
  
 metoprolol succinate 50 mg XL tablet Commonly known as:  TOPROL-XL Your last dose was: Your next dose is: TAKE 1&1/2 TABLETS BY MOUTH EVERY HS  
     
   
   
   
  
 multivitamin tablet Commonly known as:  ONE A DAY Your last dose was: Your next dose is: Take 1 Tab by mouth daily. 1 Tab  
    
   
   
   
  
 nitroglycerin 0.4 mg SL tablet Commonly known as:  NITROSTAT Your last dose was: Your next dose is:    
   
   
 every five (5) minutes as needed. PLAVIX 75 mg Tab Generic drug:  clopidogrel Your last dose was: Your next dose is: Take 75 mg by mouth daily. 75 mg  
    
   
   
   
  
 triamcinolone acetonide 0.025 % topical cream  
Commonly known as:  KENALOG Your last dose was: Your next dose is:    
   
   
 Apply  to affected area. STOP taking these medications   
 traMADol 50 mg tablet Commonly known as:  ULTRAM  
   
  
 VITAMIN D3 1,000 unit Cap Generic drug:  cholecalciferol Where to Get Your Medications Information on where to get these meds will be given to you by the nurse or doctor. ! Ask your nurse or doctor about these medications  
  oxyCODONE IR 5 mg immediate release tablet Opioid Education  Prescription Opioids: What You Need to Know: 
 
Prescription opioids can be used to help relieve moderate-to-severe pain and are often prescribed following a surgery or injury, or for certain health conditions. These medications can be an important part of treatment but also come with serious risks. Opioids are strong pain medicines. Examples include hydrocodone, oxycodone, fentanyl, and morphine. Heroin is an example of an illegal opioid. It is important to work with your health care provider to make sure you are getting the safest, most effective care. WHAT ARE THE RISKS AND SIDE EFFECTS OF OPIOID USE? Prescription opioids carry serious risks of addiction and overdose, especially with prolonged use. An opioid overdose, often marked by slow breathing, can cause sudden death. The use of prescription opioids can have a number of side effects as well, even when taken as directed. · Tolerance-meaning you might need to take more of a medication for the same pain relief · Physical dependence-meaning you have symptoms of withdrawal when the medication is stopped. Withdrawal symptoms can include nausea, sweating, chills, diarrhea, stomach cramps, and muscle aches. Withdrawal can last up to several weeks, depending on which drug you took and how long you took it. · Increased sensitivity to pain · Constipation · Nausea, vomiting, and dry mouth · Sleepiness and dizziness · Confusion · Depression · Low levels of testosterone that can result in lower sex drive, energy, and strength · Itching and sweating RISKS ARE GREATER WITH:      
· History of drug misuse, substance use disorder, or overdose · Mental health conditions (such as depression or anxiety) · Sleep apnea · Older age (72 years or older) · Pregnancy Avoid alcohol while taking prescription opioids. Also, unless specifically advised by your health care provider, medications to avoid include: · Benzodiazepines (such as Xanax or Valium) · Muscle relaxants (such as Soma or Flexeril) · Hypnotics (such as Ambien or Lunesta) · Other prescription opioids KNOW YOUR OPTIONS Talk to your health care provider about ways to manage your pain that don't involve prescription opioids. Some of these options may actually work better and have fewer risks and side effects. Options may include: 
· Pain relievers such as acetaminophen, ibuprofen, and naproxen · Some medications that are also used for depression or seizures · Physical therapy and exercise · Counseling to help patients learn how to cope better with triggers of pain and stress. · Application of heat or cold compress · Massage therapy · Relaxation techniques Be Informed Make sure you know the name of your medication, how much and how often to take it, and its potential risks & side effects. IF YOU ARE PRESCRIBED OPIOIDS FOR PAIN: 
· Never take opioids in greater amounts or more often than prescribed. Remember the goal is not to be pain-free but to manage your pain at a tolerable level. · Follow up with your primary care provider to: · Work together to create a plan on how to manage your pain. · Talk about ways to help manage your pain that don't involve prescription opioids. · Talk about any and all concerns and side effects. · Help prevent misuse and abuse. · Never sell or share prescription opioids · Help prevent misuse and abuse. · Store prescription opioids in a secure place and out of reach of others (this may include visitors, children, friends, and family). · Safely dispose of unused/unwanted prescription opioids: Find your community drug take-back program or your pharmacy mail-back program, or flush them down the toilet, following guidance from the Food and Drug Administration (www.fda.gov/Drugs/ResourcesForYou). · Visit www.cdc.gov/drugoverdose to learn about the risks of opioid abuse and overdose. · If you believe you may be struggling with addiction, tell your health care provider and ask for guidance or call [employer] at 2-633-464-ZHVO. Discharge Instructions Discharge Instructions Knee Replacement Dr. Brandin Luna Patient Name: Marzena Padilla Date of procedure: 7/30/2018 Procedure: Procedure(s): RIGHT TOTAL KNEE REPLACEMENT Surgeon: Surgeon(s) and Role: Eleanor Jimenez MD - Primary PCP: Talmage Bosworth, MD 
Date of discharge: No discharge date for patient encounter. Follow up appointments ? Follow up with Dr. Brandin Luna in 3 weeks. Call [phone] to make an appointment. ? If home health has been arranged for you the agency will contact you to arrange dates/times for visits. Please call them if you do not hear from them within 24 hours after you are discharged When to call your Orthopaedic Surgeon: Call [phone]. If you call after 5pm or on a weekend, the on call physician will be contacted ? Unrelieved pain ? Signs of infection-if your incision is red; continues to have drainage; drainage has a foul odor or if you have a persistent fever over 101 degrees ? Signs of a blood clot in your leg-calf pain, tenderness, redness, swelling of lower leg When to call your Primary Care Physician: 
? Concerns about medical conditions such as diabetes, high blood pressure, asthma, congestive heart failure ? Call if blood sugars are elevated, persistent headache or dizziness, coughing or congestion, constipation or diarrhea, burning with urination, abnormal heart rate When to call 596cnd go to the nearest emergency room ? Acute onset of chest pain, shortness of breath, difficulty breathing Activity ? Weight bearing as tolerated with walker or crutches. Refer to pages 23-31 of your handbook for instructions and pictures ? Complete your Home Exercise Program daily as instructed by your therapist.  Refer to pages 33-41 of your handbook for instructions and pictures ? Get up every one hour and walk (except at night when sleeping) ? Do not drive or operate heavy machinery Incision Care ? The Aquacel (brown, waterproof) surgical dressing is to remain on your knee for 7 days. On the 7th day have someone gently peel the dressing off by carefully lifting the edge and stretching it slightly to break the adhesive seal and leave incision open to air. You may take a shower with the Aquacel dressing in place. ? You do have staples in your knee incision; if present they will be removed by the [address] [place] [address] [employer] [facility] staff in 10-14 days and steri-strips will be applied. Leave the steri-strips on until they fall off Preventing blood clots ? Take Aspirin and Plavix as prescribed. Pain management ? Take pain medication as prescribed; decrease the amount you use as your pain lessens ? Avoid alcoholic beverages while taking pain medication ? Do not take any over-the-counter medication for pain except Tylenol (acetaminophen) ? Please be aware that many medications contain Tylenol. We do not want you to over medicate so please read the information below as a guide. Do not take more than 3 Grams of Tylenol in a 24 hour period. (There are 1000 milligrams in one Gram) 
o 325 mg of Tylenol per tablet (do not take more than 9 tablets in 24 hours) 
o 500 mg of Tylenol per tablet (do not take more than 6 tablets in 24 hours) o . 
? You may place an ice bag on your knee for 15-20 minutes after exercising and as needed throughout the day and night Diet ? Resume usual diet; drink plenty of fluids; eat foods high in fiber ? You may want to take a stool softener (such as Senokot-S or Colace) to prevent constipation while you are taking pain medication. If constipation occurs, take a laxative (such as Dulcolax tablets, Milk of Magnesia, or a suppository) Patient meets criteria for BUNDLED PAYMENT  
for Care Improvement Initiative Criteria Contact Information for Orthopedic Nurse Navigator:     
JOHN Tamez, RN-BC 
Q:[phone] O:[phone] D:[phone] Travark Announcement We are excited to announce that we are making your provider's discharge notes available to you in Travark. You will see these notes when they are completed and signed by the physician that discharged you from your recent hospital stay. If you have any questions or concerns about any information you see in Travark, please call the Health Information Department where you were seen or reach out to your Primary Care Provider for more information about your plan of care. Introducing [facility]! [employer] introduces Travark patient portal. Now you can access parts of your medical record, email your doctor's office, and request medication refills online. 1. In your internet browser, go to [email or web address] 2. Click on the First Time User? Click Here link in the Sign In box. You will see the New Member Sign Up page. 3. Enter your Travark Access Code exactly as it appears below. You will not need to use this code after youve completed the sign-up process. If you do not sign up before the expiration date, you must request a new code. · Travark Access Code: [email or web address] Expires: 10/17/2018  1:37 PM 
 
4. Enter the last four digits of your Social Security Number (xxxx) and Date of Birth (mm/dd/yyyy) as indicated and click Submit. You will be taken to the next sign-up page. 5. Create a Travark ID. This will be your Travark login ID and cannot be changed, so think of one that is secure and easy to remember. 6. Create a Travark password. You can change your password at any time. 7. Enter your Password Reset Question and Answer. This can be used at a later time if you forget your password. 8. Enter your e-mail address. You will receive e-mail notification when new information is available in [address] 9. Click Sign Up. You can now view and download portions of your medical record. 10. Click the Download Summary menu link to download a portable copy of your medical information. If you have questions, please visit the Frequently Asked Questions section of the [employer] website. Remember, [employer] is NOT to be used for urgent needs. For medical emergencies, dial 911. Now available from your iPhone and Android! Introducing Branden Han As a [employer] patient, I wanted to make you aware of our electronic visit tool called Branden Han. [employer] 24/7 allows you to connect within minutes with a medical provider 24 hours a day, seven days a week via a mobile device or tablet or logging into a secure website from your computer. You can access Branden Han from anywhere in the [place]. A virtual visit might be right for you when you have a simple condition and feel like you just dont want to get out of bed, or cant get away from work for an appointment, when your regular [employer] provider is not available (evenings, weekends or holidays), or when youre out of town and need minor care. Electronic visits cost only $49 and if the [employer] 24/7 provider determines a prescription is needed to treat your condition, one can be electronically transmitted to a nearby pharmacy*. Please take a moment to enroll today if you have not already done so. The enrollment process is free and takes just a few minutes. To enroll, please download the [employer] 24/7 sandra to your tablet or phone, or visit [email or web address] to enroll on your computer. And, as an [address] [place] [address] patient with a [employer] account, the results of your visits will be scanned into your electronic medical record and your primary care provider will be able to view the scanned results. We urge you to continue to see your regular [employer] provider for your ongoing medical care.   And while your primary care provider may not be the one available when you seek a Branden Han virtual visit, the peace of mind you get from getting a real diagnosis real time can be priceless. For more information on [employer], view our Frequently Asked Questions (FAQs) at [email or web address]. Sincerely, 
 
Desiree Turner MD 
Chief Medical Officer Leandro Wyatt *:  certain medications cannot be prescribed via Branden Emely Providers Seen During Your Hospitalization Provider Specialty Primary office phone Emmy Rodriguez MD Orthopedic Surgery [phone] Your Primary Care Physician (PCP) Primary Care Physician Office Phone Office Fax Dali Manual [phone] You are allergic to the following Allergen Reactions Amoxicillin-Pot Clavulanate Nausea and Vomiting Cephalexin Nausea and Vomiting Doxycycline Nausea and Vomiting Neomycin-Polymyxin-Hc Nausea and Vomiting Rocephin (Ceftriaxone) Nausea and Vomiting  
    
 Sulfamethoxazole-Trimethoprim Nausea and Vomiting Recent Documentation Height Weight BMI OB Status Smoking Status 1.524 m 68 kg 29.28 kg/m2 Hysterectomy Never Smoker Emergency Contacts Name Discharge Info Relation Home Work Mobile [address] CAREGIVER [3] Child [2] [phone] Patient Belongings The following personal items are in your possession at time of discharge: 
     Visual Aid: Glasses             Clothing:  (bag of clothes returned to pt inpacu) Please provide this summary of care documentation to your next provider. Signatures-by signing, you are acknowledging that this After Visit Summary has been reviewed with you and you have received a copy. Patient Signature:  ____________________________________________________________  Date:  ____________________________________________________________  
  
Mary Owusu    
 Provider Signature:  ____________________________________________________________ Date:  ____________________________________________________________

## 2018-07-31 LAB
ANION GAP SERPL CALC-SCNC: 7 MMOL/L (ref 5–15)
BUN SERPL-MCNC: 23 MG/DL (ref 6–20)
BUN/CREAT SERPL: 18 (ref 12–20)
CALCIUM SERPL-MCNC: 8.2 MG/DL (ref 8.5–10.1)
CHLORIDE SERPL-SCNC: 107 MMOL/L (ref 97–108)
CO2 SERPL-SCNC: 26 MMOL/L (ref 21–32)
CREAT SERPL-MCNC: 1.25 MG/DL (ref 0.55–1.02)
GLUCOSE SERPL-MCNC: 169 MG/DL (ref 65–100)
HGB BLD-MCNC: 9.1 G/DL (ref 11.5–16)
POTASSIUM SERPL-SCNC: 3.9 MMOL/L (ref 3.5–5.1)
SODIUM SERPL-SCNC: 140 MMOL/L (ref 136–145)

## 2018-07-31 PROCEDURE — 80048 BASIC METABOLIC PNL TOTAL CA: CPT | Performed by: PHYSICIAN ASSISTANT

## 2018-07-31 PROCEDURE — G8987 SELF CARE CURRENT STATUS: HCPCS

## 2018-07-31 PROCEDURE — 74011250637 HC RX REV CODE- 250/637: Performed by: NURSE PRACTITIONER

## 2018-07-31 PROCEDURE — 97165 OT EVAL LOW COMPLEX 30 MIN: CPT

## 2018-07-31 PROCEDURE — 74011250636 HC RX REV CODE- 250/636: Performed by: PHYSICIAN ASSISTANT

## 2018-07-31 PROCEDURE — G8979 MOBILITY GOAL STATUS: HCPCS

## 2018-07-31 PROCEDURE — 74011250637 HC RX REV CODE- 250/637: Performed by: PHYSICIAN ASSISTANT

## 2018-07-31 PROCEDURE — 97116 GAIT TRAINING THERAPY: CPT

## 2018-07-31 PROCEDURE — G8988 SELF CARE GOAL STATUS: HCPCS

## 2018-07-31 PROCEDURE — G8978 MOBILITY CURRENT STATUS: HCPCS

## 2018-07-31 PROCEDURE — 97161 PT EVAL LOW COMPLEX 20 MIN: CPT

## 2018-07-31 PROCEDURE — 97535 SELF CARE MNGMENT TRAINING: CPT

## 2018-07-31 PROCEDURE — 97530 THERAPEUTIC ACTIVITIES: CPT

## 2018-07-31 PROCEDURE — 65270000029 HC RM PRIVATE

## 2018-07-31 PROCEDURE — 85018 HEMOGLOBIN: CPT | Performed by: PHYSICIAN ASSISTANT

## 2018-07-31 PROCEDURE — 36415 COLL VENOUS BLD VENIPUNCTURE: CPT | Performed by: PHYSICIAN ASSISTANT

## 2018-07-31 RX ORDER — METOPROLOL SUCCINATE 25 MG/1
25 TABLET, EXTENDED RELEASE ORAL DAILY
Status: DISCONTINUED | OUTPATIENT
Start: 2018-07-31 | End: 2018-08-03 | Stop reason: HOSPADM

## 2018-07-31 RX ORDER — ALLOPURINOL 100 MG/1
150 TABLET ORAL DAILY
Status: DISCONTINUED | OUTPATIENT
Start: 2018-07-31 | End: 2018-08-03 | Stop reason: HOSPADM

## 2018-07-31 RX ORDER — ONDANSETRON 4 MG/1
4 TABLET, ORALLY DISINTEGRATING ORAL
Status: DISCONTINUED | OUTPATIENT
Start: 2018-07-31 | End: 2018-08-03 | Stop reason: HOSPADM

## 2018-07-31 RX ORDER — BUMETANIDE 1 MG/1
2 TABLET ORAL DAILY
Status: DISCONTINUED | OUTPATIENT
Start: 2018-07-31 | End: 2018-08-03 | Stop reason: HOSPADM

## 2018-07-31 RX ADMIN — ACETAMINOPHEN 1000 MG: 500 TABLET, FILM COATED ORAL at 00:55

## 2018-07-31 RX ADMIN — OXYCODONE HYDROCHLORIDE 5 MG: 5 TABLET ORAL at 17:20

## 2018-07-31 RX ADMIN — DOCUSATE SODIUM AND SENNOSIDES 1 TABLET: 8.6; 5 TABLET, FILM COATED ORAL at 17:20

## 2018-07-31 RX ADMIN — ASPIRIN 81 MG: 81 TABLET, DELAYED RELEASE ORAL at 10:03

## 2018-07-31 RX ADMIN — Medication 10 ML: at 07:30

## 2018-07-31 RX ADMIN — GABAPENTIN 300 MG: 300 CAPSULE ORAL at 17:21

## 2018-07-31 RX ADMIN — OXYCODONE HYDROCHLORIDE 5 MG: 5 TABLET ORAL at 13:04

## 2018-07-31 RX ADMIN — SODIUM CHLORIDE 125 ML/HR: 900 INJECTION, SOLUTION INTRAVENOUS at 02:51

## 2018-07-31 RX ADMIN — BUMETANIDE 2 MG: 1 TABLET ORAL at 17:20

## 2018-07-31 RX ADMIN — ACETAMINOPHEN 1000 MG: 500 TABLET, FILM COATED ORAL at 13:03

## 2018-07-31 RX ADMIN — TRAMADOL HYDROCHLORIDE 50 MG: 50 TABLET, FILM COATED ORAL at 00:55

## 2018-07-31 RX ADMIN — CLOPIDOGREL BISULFATE 75 MG: 75 TABLET ORAL at 10:03

## 2018-07-31 RX ADMIN — Medication 2 G: at 07:30

## 2018-07-31 RX ADMIN — GABAPENTIN 300 MG: 300 CAPSULE ORAL at 10:02

## 2018-07-31 RX ADMIN — ACETAMINOPHEN 1000 MG: 500 TABLET, FILM COATED ORAL at 07:30

## 2018-07-31 RX ADMIN — POLYETHYLENE GLYCOL 3350 17 G: 17 POWDER, FOR SOLUTION ORAL at 10:03

## 2018-07-31 RX ADMIN — METOPROLOL SUCCINATE 25 MG: 25 TABLET, EXTENDED RELEASE ORAL at 17:20

## 2018-07-31 RX ADMIN — OXYCODONE HYDROCHLORIDE 5 MG: 5 TABLET ORAL at 02:32

## 2018-07-31 RX ADMIN — TRAMADOL HYDROCHLORIDE 50 MG: 50 TABLET, FILM COATED ORAL at 10:03

## 2018-07-31 RX ADMIN — ALLOPURINOL 150 MG: 100 TABLET ORAL at 10:02

## 2018-07-31 RX ADMIN — DOCUSATE SODIUM AND SENNOSIDES 1 TABLET: 8.6; 5 TABLET, FILM COATED ORAL at 10:03

## 2018-07-31 NOTE — ANESTHESIA POSTPROCEDURE EVALUATION
Post-Anesthesia Evaluation and Assessment Patient: Roya Dias MRN: [id number]  SSN: [id number] YOB: 1933  Age: 80 y.o. Sex: female Cardiovascular Function/Vital Signs Visit Vitals  BP 99/62 (BP 1 Location: Right arm, BP Patient Position: At rest)  Pulse 78  Temp 36.7 °C (98 °F)  Resp 16  
 Ht 5' (1.524 m)  Wt 68 kg (150 lb)  SpO2 94%  BMI 29.29 kg/m2 Patient is status post spinal anesthesia for Procedure(s): RIGHT TOTAL KNEE REPLACEMENT. Nausea/Vomiting: None Postoperative hydration reviewed and adequate. Pain: 
Pain Scale 1: Numeric (0 - 10) (07/31/18 0230) Pain Intensity 1: 6 (07/31/18 0230) Managed Neurological Status:  
Neuro (WDL): Within Defined Limits (07/30/18 1901) Neuro Neurologic State: Alert (07/30/18 2000) LUE Motor Response: Purposeful (07/30/18 2000) LLE Motor Response: Purposeful (07/30/18 2000) RUE Motor Response: Purposeful (07/30/18 2000) RLE Motor Response: Purposeful (07/30/18 2000) At baseline Mental Status and Level of Consciousness: Arousable Pulmonary Status:  
O2 Device: Room air (07/30/18 1929) Adequate oxygenation and airway patent Complications related to anesthesia: None Post-anesthesia assessment completed. No concerns Signed By: Deisy Torres MD   
 July 31, 2018

## 2018-07-31 NOTE — PROGRESS NOTES
Asked patient if she wanted to ambulate. Patient refused. Explained to patient how important it is to get people up. Patient still declined.

## 2018-07-31 NOTE — PROGRESS NOTES
Spiritual Care Partner Volunteer visited patient in [facility] on 7/31/2018. Documented by: 
Chaplain Ingram MDiv, MS, [place] [address] PRA ([id number])

## 2018-07-31 NOTE — PROGRESS NOTES
Complaints: pain Events: none GEN:  NAD. AOx3 ABD:  S/NT/ND  
RLE:  Dressing C/D/I  
 5/5 motor Calf nttp (Bilat) Sensate all distribution to light touch 1+ dp/pt pulses, foot perfused Lab Results Component Value Date/Time HGB 9.1 (L) 07/31/2018 02:46 AM  
 INR 1.0 07/19/2018 02:23 PM  
 
 
Lab Results Component Value Date/Time Sodium 140 07/31/2018 02:46 AM  
 Potassium 3.9 07/31/2018 02:46 AM  
 Chloride 107 07/31/2018 02:46 AM  
 CO2 26 07/31/2018 02:46 AM  
 BUN 23 (H) 07/31/2018 02:46 AM  
 Creatinine 1.25 (H) 07/31/2018 02:46 AM  
 Calcium 8.2 (L) 07/31/2018 02:46 AM  
 Magnesium 1.8 09/08/2014 12:05 PM  
 
 
 
  
POD #1 RIGHT TOTAL KNEE REPLACEMENT. Satisfactory progress. Patient is having moderate to severe pain that is keeping her from completing PT. She had one episode of orthostatic hypotension which has resolved. Patient would like to go to the [facility] upon discharge, she went there after her last joint replacement surgery. ABX: Complete today PATHWAY: D/C Gabbie per protocol DVT Prophylaxis: ASA and Plavix Weight Bearing: WBAT RLE Pain Control: PRN oral narcotics, celebrex Anticipated Discharge Date: pending Disposition: The [facility] ([facility])

## 2018-07-31 NOTE — PROGRESS NOTES
4:09 PM 
Bedside and Verbal shift change report given to [employer], RN (oncoming nurse) by Meagan Jessica RN (offgoing nurse). Report included the following information SBAR, Kardex, OR Summary, Intake/Output, MAR and Recent Results.

## 2018-07-31 NOTE — PROGRESS NOTES
Problem: Discharge Planning Goal: *Discharge to safe environment Outcome: Progressing Towards Goal 
SNF

## 2018-07-31 NOTE — PROGRESS NOTES
Problem: Mobility Impaired (Adult and Pediatric) Goal: *Acute Goals and Plan of Care (Insert Text) Physical Therapy Goals Initiated 7/31/2018 1. Patient will move from supine to sit and sit to supine , scoot up and down and roll side to side in bed with minimal assistance within 4 days. 2. Patient will perform sit to stand with minimal assistance within 4 days. 3. Patient will ambulate with minimal assistance for 50 feet with the least restrictive device within 4 days. 4. Patient will perform home exercise program per protocol with independence within 4 days. 5. Patient will demonstrate AROM 0-90 degrees in operative joint within 4 days. physical Therapy EVALUATION Patient: Arnoldo Oliva (46 y.o. female) Date: 7/31/2018 Primary Diagnosis: OSTEOARTHRITIS RIGHT KNEE Osteoarthritis of right knee Procedure(s) (LRB): 
RIGHT TOTAL KNEE REPLACEMENT (Right) 1 Day Post-Op Precautions:   Fall, WBAT (hx of falls and orthostatic hypotension) ASSESSMENT : 
Based on the objective data described below, the patient presents with hx of falls and orthostatic hypotension (fell two days ago and now has bruising on face), decreased ability to bear weight on R LE due to pain, poor standing posture during both static and dynamic standing, shuffling gait, and decline from baseline following R TKR POD 1. Patient received slumped down in bed, son feeding her, and patient stating she felt \"horrible\" and that her \"leg was hurting. \" Noted patient did not mobilize yesterday evening. Agreeable to attempting mobility. Limited by pain and overall max Ax1 for bed mobility with fluctuating assist mod-max Ax1-2 for sit<>stand over the course of many trials. Patient able to take only ~4 shuffling steps before needing chair brought behind her. She was orthostatic (110/69 supine to 89/55 in sitting post activity) but was able to recover somewhat to 100/61 with ankle pumps. Patient pale and nauseas.  Following transfer to [facility], patient's BP continued to drop and she was ultimately placed back in bed via max-total Ax2 for transfer due to severely forward flexed posture and patient essentially \"giving up\" during stand pivot. Required max cues throughout for posture during all standing activities. BP stable upon return to bed. She is very weak and appears that she was prior to surgery with long history of falls. Highly recommend d/c to SNF. Patient will benefit from skilled intervention to address the above impairments. Patients rehabilitation potential is considered to be Fair Factors which may influence rehabilitation potential include:  
[]         None noted 
[]         Mental ability/status [x]         Medical condition 
[]         Home/family situation and support systems 
[]         Safety awareness [x]         Pain tolerance/management 
[x]         Other: very weak before surgery PLAN : 
Recommendations and Planned Interventions: 
[x]           Bed Mobility Training             [x]    Neuromuscular Re-Education 
[x]           Transfer Training                   []    Orthotic/Prosthetic Training 
[x]           Gait Training                         []    Modalities [x]           Therapeutic Exercises           [x]    Edema Management/Control 
[x]           Therapeutic Activities            [x]    Patient and Family Training/Education 
[]           Other (comment): Frequency/Duration: Patient will be followed by physical therapy  twice daily to address goals. Discharge Recommendations: [facility] Jade Further Equipment Recommendations for Discharge: TBD at SNF SUBJECTIVE:  
Patient stated ohhh I'm going to vomit.  OBJECTIVE DATA SUMMARY:  
HISTORY:   
Past Medical History:  
Diagnosis Date  Breast cancer ([facility] [address] 1997  
 lumpectomy, RADIATION  
 Cancer [facility]) 2002  
 dcis left breast 2002  Cardiomyopathy ([facility] [address]  Chronic pain  Coronary artery disease involving native coronary artery of native heart without angina pectoris 12/16/2016  
 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  
Procedure Laterality Date  CARDIAC SURG PROCEDURE UNLIST  2015 STENT PLACEMENTS X2  
 HX APPENDECTOMY    
 YRS AGO PT DON`T REMEMBER   
 HX BREAST BIOPSY Left 1980`S  HX BREAST LUMPECTOMY  1988  
 dcis lelf breast  
 HX CATARACT REMOVAL Bilateral 1980`S  HX COLONOSCOPY    
 HX CORONARY STENT PLACEMENT  2015 X6  
 HX GI    
 COLONOSCOPY  
 HX HYSTERECTOMY  1960  HX OTHER SURGICAL    
 ORAL  
 HX OTHER SURGICAL  07/2018 SQUAMOUS CELL CA REMOVED FROM RIGHT FOREARM   
 TOTAL HIP ARTHROPLASTY Left 2018  VASCULAR SURGERY PROCEDURE UNLIST    
 varicose veins Prior Level of Function/Home Situation: Lives alone, was using rollator, history of falls (fell two days ago and bruised her face-- referenced this was due to her low blood pressure which she says she fights at home daily), supportive son, recent hip replacement in April and d/c'ed to [facility] at that time Personal factors and/or comorbidities impacting plan of care: PMH, pain, weak with poor baseline prior to surgery Home Situation Home Environment: Private residence # Steps to Enter: 1 One/Two Story Residence: Two story, live on 1st floor Living Alone: Yes Support Systems: Child(garcia) Patient Expects to be Discharged to[de-identified] Rehabilitation facility Current DME Used/Available at Home: Dary Ferrer, tirso, Issac Locket, rollator EXAMINATION/PRESENTATION/DECISION MAKING:  
Critical Behavior: 
Neurologic State: Alert Range Of Motion: 
AROM: Generally decreased, functional 
Strength:   
Strength: Generally decreased, functional 
Tone & Sensation:  
Tone: Normal 
Sensation: Intact Coordination: 
Coordination: Generally decreased, functional 
Functional Mobility: 
Bed Mobility: 
Supine to Sit: Maximum assistance;Assist x1 Sit to Supine: Total assistance;Assist x2 Scooting: Maximum assistance;Assist x1 Transfers: 
Sit to Stand: Minimum assistance;Maximum assistance;Assist x1;Assist x2 (fluctuating through trials) Stand to Sit: Moderate assistance Stand Pivot Transfers: Assist x2 Bed to Chair: Maximum assistance;Assist x2 Balance:  
Sitting: Intact Standing: Impaired; With support Standing - Static: Fair;Poor Standing - Dynamic : Poor Ambulation/Gait Training: 
Distance (ft): 3 Feet (ft) (x2) Assistive Device: Walker, rolling;Gait belt Ambulation - Level of Assistance: Moderate assistance;Maximum assistance;Assist x1;Assist x2 (fluctuating) Gait Abnormalities: Antalgic;Decreased step clearance;Shuffling gait (forward flexed posture) Right Side Weight Bearing: As tolerated Left Side Weight Bearing: Full Base of Support: Widened Stance: Right decreased Speed/Juliana: Shuffled; Slow Step Length: Right shortened;Left shortened Swing Pattern: Right asymmetrical 
 
Therapeutic Exercises:  
Attempted quad set x10, extremely weak contraction- only trace Functional Measure: 
Tinetti test: 
 
Sitting Balance: 1 Arises: 0 Attempts to Rise: 0 Immediate Standing Balance: 0 Standing Balance: 0 Nudged: 0 Eyes Closed: 0 Turn 360 Degrees - Continuous/Discontinuous: 0 Turn 360 Degrees - Steady/Unsteady: 0 Sitting Down: 0 Balance Score: 1 Indication of Gait: 0 
R Step Length/Height: 0 
L Step Length/Height: 0 
R Foot Clearance: 0 
L Foot Clearance: 0 Step Symmetry: 0 Step Continuity: 0 Path: 0 Trunk: 0 Walking Time: 0 Gait Score: 0 Total Score: 1 Tinetti Test and G-code impairment scale: 
Percentage of Impairment CH 
 
0% 
 CI 
 
1-19% CJ 
 
20-39% CK 
 
40-59% CL 
 
60-79% CM 
 
80-99% CN  
 
100% Tinetti Score 0-28 28 23-27 17-22 12-16 6-11 1-5 0 Tinetti Tool Score Risk of Falls 
<19 = High Fall Risk 19-24 = Moderate Fall Risk 25-28 = Low Fall Risk Tinetti ME. Performance-Oriented Assessment of Mobility Problems in Elderly Patients. [facility] 66; [id number]. (Scoring Description: PT Bulletin Feb. 10, 1993) Older adults: Nayely Lazcano et al, 2009; n = [address] elderly evaluated with ABC, MONTEZ, ADL, and IADL) · Mean MONTEZ score for males aged 69-68 years = 26.21(3.40) · Mean MONTEZ score for females age 69-68 years = 25.16(4.30) · Mean MONTEZ score for males over 80 years = 23.29(6.02) · Mean MONTEZ score for females over 80 years = 17.20(8.32) G codes: In compliance with CMSs Claims Based Outcome Reporting, the following G-code set was chosen for this patient based on their primary functional limitation being treated: The outcome measure chosen to determine the severity of the functional limitation was the Tinetti with a score of 1/28 which was correlated with the impairment scale. ? Mobility - Walking and Moving Around:  
  - CURRENT STATUS: CM - 80%-99% impaired, limited or restricted  - GOAL STATUS: CL - 60%-79% impaired, limited or restricted  - D/C STATUS:  ---------------To be determined--------------- Physical Therapy Evaluation Charge Determination History Examination Presentation Decision-Making HIGH Complexity :3+ comorbidities / personal factors will impact the outcome/ POC  MEDIUM Complexity : 3 Standardized tests and measures addressing body structure, function, activity limitation and / or participation in recreation  LOW Complexity : Stable, uncomplicated  Other outcome measures Tinetti 1/28  HIGH Based on the above components, the patient evaluation is determined to be of the following complexity level: LOW Pain: 
Pain Scale 1: Numeric (0 - 10) Pain Intensity 1: 6 Pain Location 1: Knee Pain Orientation 1: Right Pain Description 1: Aching Pain Intervention(s) 1: Medication (see MAR) 
Activity Tolerance:  
Limited by orthostatic hypotension and pain Please refer to the flowsheet for vital signs taken during this treatment. After treatment:  
[]         Patient left in no apparent distress sitting up in chair 
[x]         Patient left in no apparent distress in bed 
[x]         Call bell left within reach [x]         Nursing notified 
[]         Caregiver present 
[]         Bed alarm activated COMMUNICATION/EDUCATION:  
The patients plan of care was discussed with: Occupational Therapist, Registered Nurse and . [x]         Fall prevention education was provided and the patient/caregiver indicated understanding. [x]         Patient/family have participated as able in goal setting and plan of care. [x]         Patient/family agree to work toward stated goals and plan of care. []         Patient understands intent and goals of therapy, but is neutral about his/her participation. []         Patient is unable to participate in goal setting and plan of care. Thank you for this referral. 
Rodriguez Goddard, PT, DPT Time Calculation: 44 mins

## 2018-07-31 NOTE — PROGRESS NOTES
Care Management Interventions PCP Verified by CM: Yes (Dr. Stephenie Landeros) Palliative Care Criteria Met (RRAT>21 & CHF Dx)?: No 
Mode of Transport at Discharge: BLS Transition of Care Consult (CM Consult): SNF (Referral sent to [facility]) Partner SNF: No 
Reason Why Partner SNF Not Chosen: Positive previous encounter MyChart Signup: No 
Discharge Durable Medical Equipment: No 
Health Maintenance Reviewed: Yes Physical Therapy Consult: Yes Occupational Therapy Consult: Yes Speech Therapy Consult: No 
Current Support Network: Own Home, Lives Alone Confirm Follow Up Transport: Family Plan discussed with Pt/Family/Caregiver: Yes Freedom of Choice Offered: Yes [facility] [employer] Information Provided?: No 
Discharge Location Discharge Placement: Skilled nursing facility Reason for Admission:   Right Total Knee, Medicare Bundle RRAT Score:   18 Do you (patient/family) have any concerns for transition/discharge?     none Plan for utilizing home health:     No, SNF Referral to The [address] [place] [address] Likelihood of readmission?   moderate Transition of Care Plan:      SNF [facility] has accepted the patient for SNF. CRM will plan on discharge on Thursday.  DONNY

## 2018-07-31 NOTE — PROGRESS NOTES
Bedside shift change report given to Trisha (oncoming nurse) by Lisandra López (offgoing nurse). Report included the following information SBAR, Kardex, Intake/Output, MAR and Recent Results.

## 2018-07-31 NOTE — PROGRESS NOTES
Problem: Self Care Deficits Care Plan (Adult) Goal: *Acute Goals and Plan of Care (Insert Text) Occupational Therapy Goals Initiated 7/31/2018 1. Patient will perform grooming with supervision/set-up sitting in chair within 7 day(s). 2.  Patient will perform bathing with mod A from chair within 7 day(s). 3.  Patient will perform upper body dressing and lower body dressing with mod A within 7 day(s). 4.  Patient will perform toilet transfers with mod A within 7 day(s). 5.  Patient will perform all aspects of toileting with min A within 7 day(s). Occupational Therapy EVALUATION Patient: Marzena Padilla (53 y.o. female) Date: 7/31/2018 Primary Diagnosis: OSTEOARTHRITIS RIGHT KNEE Osteoarthritis of right knee Procedure(s) (LRB): 
RIGHT TOTAL KNEE REPLACEMENT (Right) 1 Day Post-Op Precautions:   Fall, WBAT 
 
ASSESSMENT : 
Based on the objective data described below, the patient presents with decreased independence with self care and functional mobility following admission for R TKR. Pt was able to progress OOB with assistance of PT/OT as pt required additional skilled intervention due to orthostatic BP. Pt did well progressing to EOB but was resistant to movement. Encouraged her to progress mobility to help with pain control as well and was able to come to standing and take a few steps. She was unable to transfer to chair due to drop in BP. Pt unsafe to return home at current level. Recommend discharge to rehab setting. Patient will benefit from skilled intervention to address the above impairments. Patients rehabilitation potential is considered to be Good Factors which may influence rehabilitation potential include:  
[x]             None noted []             Mental ability/status []             Medical condition []             Home/family situation and support systems []             Safety awareness []             Pain tolerance/management 
[]             Other: PLAN : 
Recommendations and Planned Interventions: 
[x]               Self Care Training                  [x]        Therapeutic Activities [x]               Functional Mobility Training    []        Cognitive Retraining 
[x]               Therapeutic Exercises           [x]        Endurance Activities [x]               Balance Training                   []        Neuromuscular Re-Education []               Visual/Perceptual Training     [x]   Home Safety Training 
[x]               Patient Education                 [x]        Family Training/Education []               Other (comment): Frequency/Duration: Patient will be followed by occupational therapy 5 times a week to address goals. Discharge Recommendations: rehab Further Equipment Recommendations for Discharge: none SUBJECTIVE:  
Patient stated I am feeling alright.  OBJECTIVE DATA SUMMARY:  
HISTORY:  
Past Medical History:  
Diagnosis Date  Breast cancer ([facility] [address] 1997  
 lumpectomy, RADIATION  
 Cancer [facility]) 2002  
 dcis left breast 2002  Cardiomyopathy ([facility] [address]  Chronic pain  Coronary artery disease involving native coronary artery of native heart without angina pectoris 12/16/2016  
 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  
Procedure Laterality Date  CARDIAC SURG PROCEDURE UNLIST  2015 STENT PLACEMENTS X2  
 HX APPENDECTOMY    
 YRS AGO PT DON`T REMEMBER   
 HX BREAST BIOPSY Left 1980`S  HX BREAST LUMPECTOMY  1988  
 dcis lelf breast  
 HX CATARACT REMOVAL Bilateral 1980`S  HX COLONOSCOPY    
 HX CORONARY STENT PLACEMENT  2015 X6  
 HX GI    
 COLONOSCOPY  
 HX HYSTERECTOMY  1960  HX OTHER SURGICAL    
 ORAL  
 HX OTHER SURGICAL  07/2018 SQUAMOUS CELL CA REMOVED FROM RIGHT FOREARM   
 TOTAL HIP ARTHROPLASTY Left 2018  VASCULAR SURGERY PROCEDURE UNLIST    
 varicose veins Prior Level of Function/Environment/Context: pt was independent at home. She had a hip replacement in April and has done well with this per her report. However, dicussed in ID rounds this date, pt with multiple falls at home. Occupations in which the patient is/was successful, what are the barriers preventing that success:  
Performance Patterns (routines, roles, habits, and rituals):  
Personal Interests and/or values:  
Expanded or extensive additional review of patient history:  
 
Home Situation Home Environment: Private residence # Steps to Enter: 1 One/Two Story Residence: Two story, live on 1st floor # of Interior Steps: 0 Lift Chair Available: No 
Living Alone: Yes Support Systems: Child(garcia) Patient Expects to be Discharged to[de-identified] Rehabilitation facility Current DME Used/Available at Home: Cane, straight, Walker, rolling Tub or Shower Type: Shower Hand dominance: Right EXAMINATION OF PERFORMANCE DEFICITS: 
Cognitive/Behavioral Status: 
Neurologic State: Alert Orientation Level: Oriented X4 Cognition: Appropriate for age attention/concentration Perception: Appears intact Perseveration: No perseveration noted Safety/Judgement: Good awareness of safety precautions Skin: bruising noted on nose and right orbital region. Edema: RLE elevated at end of session. Hearing: Auditory Auditory Impairment: None Vision/Perceptual:   
    
    
    
  
    
Acuity: Within Defined Limits Range of Motion: 
 
AROM: Within functional limits PROM: Within functional limits Strength: 
 
Strength: Within functional limits Coordination: 
Coordination: Within functional limits Fine Motor Skills-Upper: Right Intact; Left Intact Gross Motor Skills-Upper: Right Intact; Left Intact Tone & Sensation: 
 
Tone: Normal 
Sensation: Intact Balance: 
Sitting: Intact Standing: Impaired; With support Standing - Static: Fair;Poor Standing - Dynamic : Poor Functional Mobility and Transfers for ADLs: 
Bed Mobility: 
Supine to Sit: Moderate assistance;Assist x2 Sit to Supine: Total assistance;Assist x2 Scooting: Minimum assistance Transfers: 
Sit to Stand: Minimum assistance; Moderate assistance;Assist x2 Stand to Sit: Moderate assistance;Assist x2 Bed to Chair:  (unable to progress to chair due to orthostatic BP) Toilet Transfer :  (unable to progress to chair due to orthostatic BP) ADL Assessment: 
Feeding: Supervision Oral Facial Hygiene/Grooming: Supervision Bathing: Total assistance Upper Body Dressing: Total assistance Lower Body Dressing: Total assistance Toileting: Total assistance ADL Intervention and task modifications: 
 Pt was able to progress from supine in bed to EOB and then to attempt standing x 2 attempts. Cognitive Retraining Safety/Judgement: Good awareness of safety precautions Functional Measure: 
Barthel Index: 
 
Bathin Bladder: 0 Bowels: 10 
Groomin Dressin Feeding: 10 Mobility: 5 Stairs: 0 Toilet Use: 5 Transfer (Bed to Chair and Back): 5 Total: 45 Barthel and G-code impairment scale: 
Percentage of impairment CH 
0% CI 
1-19% CJ 
20-39% CK 
40-59% CL 
60-79% CM 
80-99% CN 
100% Barthel Score 0-100 100 99-80 79-60 59-40 20-39 1-19 
 0 Barthel Score 0-20 20 17-19 13-16 9-12 5-8 1-4 0 The Barthel ADL Index: Guidelines 1. The index should be used as a record of what a patient does, not as a record of what a patient could do. 2. The main aim is to establish degree of independence from any help, physical or verbal, however minor and for whatever reason. 3. The need for supervision renders the patient not independent.  
4. A patient's performance should be established using the best available evidence. Asking the patient, friends/relatives and nurses are the usual sources, but direct observation and common sense are also important. However direct testing is not needed. 5. Usually the patient's performance over the preceding 24-48 hours is important, but occasionally longer periods will be relevant. 6. Middle categories imply that the patient supplies over 50 per cent of the effort. 7. Use of aids to be independent is allowed. Fatemeh Matos., Barthel, D.W. (3159). Functional evaluation: the Barthel Index. [address] [facility] (14)2. Jones Carrow alphonso JOSELO Vera, Mandi Spears., Luci Duncan., Jordana, [address] (1999). Measuring the change indisability after inpatient rehabilitation; comparison of the responsiveness of the Barthel Index and Functional [place] Measure. Journal of Neurology, Neurosurgery, and Psychiatry, 66(4), 071-282. HEAVENLY Jarrett.A, OSCAR Greer, & Caitlin Gaitan MJorge AA. (2004.) Assessment of post-stroke quality of life in cost-effectiveness studies: The usefulness of the Barthel Index and the EuroQoL-5D. [facility], 13, 700-84 G codes: In compliance with CMSs Claims Based Outcome Reporting, the following G-code set was chosen for this patient based on their primary functional limitation being treated: The outcome measure chosen to determine the severity of the functional limitation was the Barthel Index with a score of 45/100 which was correlated with the impairment scale. ? Self Care:  
  - CURRENT STATUS: CK - 40%-59% impaired, limited or restricted  - GOAL STATUS: CI - 1%-19% impaired, limited or restricted  - D/C STATUS:  ---------------To be determined--------------- Occupational Therapy Evaluation Charge Determination History Examination Decision-Making LOW Complexity : Brief history review  MEDIUM Complexity : 3-5 performance deficits relating to physical, cognitive , or psychosocial skils that result in activity limitations and / or participation restrictions MEDIUM Complexity : Patient may present with comorbidities that affect occupational performnce. Miniml to moderate modification of tasks or assistance (eg, physical or verbal ) with assesment(s) is necessary to enable patient to complete evaluation Based on the above components, the patient evaluation is determined to be of the following complexity level: MEDIUM Pain: 
Pain Scale 1: Numeric (0 - 10) Pain Intensity 1: 8 Pain Location 1: Knee Pain Orientation 1: Right Pain Description 1: Aching Pain Intervention(s) 1: Medication (see MAR); Cold pack; Distraction; Emotional support Activity Tolerance: VSS throughout session. Patient Vitals for the past 4 hrs: 
 Temp Pulse Resp BP SpO2  
07/31/18 1432 -supine following prolonged recovery 74 - 132/77 -  
07/31/18 1428 -supine 77 - 102/61 -  
07/31/18 1425 -sitting EOB following 2nd standing attempt 96 - (!) 80/51 -  
07/31/18 1420 -sitting after standing - - 116/75 -  
07/31/18 1418 -standing 95 - 104/80 -  
07/31/18 1415 -sitting 77 - 136/78 -  
07/31/18 1410 supine- 74 - 124/73 - After treatment:  
[] Patient left in no apparent distress sitting up in chair 
[x] Patient left in no apparent distress in bed 
[x] Call bell left within reach [x] Nursing notified 
[] Caregiver present 
[] Bed alarm activated COMMUNICATION/EDUCATION:  
The patients plan of care was discussed with: Physical Therapist and Registered Nurse. [x] Home safety education was provided and the patient/caregiver indicated understanding. [x] Patient/family have participated as able in goal setting and plan of care. [] Patient/family agree to work toward stated goals and plan of care. [] Patient understands intent and goals of therapy, but is neutral about his/her participation. [] Patient is unable to participate in goal setting and plan of care.  
This patients plan of care is appropriate for delegation to BLAIR. 
 
Thank you for this referral. 
Dominique Salazar, OT Time Calculation: 28 mins

## 2018-07-31 NOTE — PROGRESS NOTES
Problem: Mobility Impaired (Adult and Pediatric) Goal: *Acute Goals and Plan of Care (Insert Text) Physical Therapy Goals Initiated 7/31/2018 1. Patient will move from supine to sit and sit to supine , scoot up and down and roll side to side in bed with minimal assistance within 4 days. 2. Patient will perform sit to stand with minimal assistance within 4 days. 3. Patient will ambulate with minimal assistance for 50 feet with the least restrictive device within 4 days. 4. Patient will perform home exercise program per protocol with independence within 4 days. 5. Patient will demonstrate AROM 0-90 degrees in operative joint within 4 days. physical Therapy TREATMENT Patient: Hermes Cali (35 y.o. female) Date: 7/31/2018 Diagnosis: OSTEOARTHRITIS RIGHT KNEE Osteoarthritis of right knee Osteoarthritis of right knee Procedure(s) (LRB): 
RIGHT TOTAL KNEE REPLACEMENT (Right) 1 Day Post-Op Precautions: Fall, WBAT (orthostatic hypotension) Chart, physical therapy assessment, plan of care and goals were reviewed. ASSESSMENT: 
Patient received supine in bed, requiring increased encouragement to participate in PT/OT co-treatment session. Still c/o increased pain with all R LE movement. Improved ability to participate actively in bed mobility with assist and cues from OT. VSS with supine to sit (sitting 136/78) however dropped to 104/80 and patient requesting to return to sitting. BP recovered with seated rest but after second standing trial with one shuffled step, patient returned to sitting and endorsed nausea, BP 80/51. Returned to supine and BP stable at 132/77 at end of session. Continues to be limited by unstable blood pressure, significantly forward flexed posture in standing requiring max cues, pain, and generalized weakness. Highly recommend SNF at d/c. Progression toward goals: 
[]      Improving appropriately and progressing toward goals [x]      Improving slowly and progressing toward goals 
[]      Not making progress toward goals and plan of care will be adjusted PLAN: 
Patient continues to benefit from skilled intervention to address the above impairments. Continue treatment per established plan of care. Discharge Recommendations:  Nicholas Jade Further Equipment Recommendations for Discharge:  TBD at SNF SUBJECTIVE:  
Patient stated I need to lay down.  OBJECTIVE DATA SUMMARY:  
Critical Behavior: 
Neurologic State: Alert, Appropriate for age Range of Motion: 
AROM: Generally decreased, functional 
Functional Mobility Training: 
Bed Mobility: 
Supine to Sit: Moderate assistance;Assist x2 Sit to Supine: Total assistance;Assist x2 Scooting: Minimum assistance Transfers: 
Sit to Stand: Minimum assistance; Moderate assistance;Assist x2 Stand to Sit: Moderate assistance;Assist x2 Stand Pivot Transfers: Assist x2 Bed to Chair: Maximum assistance;Assist x2 Balance: 
Sitting: Intact Standing: Impaired; With support Standing - Static: Fair;Poor Standing - Dynamic : Poor Ambulation/Gait Training: 
Distance (ft): 2 Feet (ft) Assistive Device: Walker, rolling;Gait belt Ambulation - Level of Assistance: Maximum assistance;Assist x2 Gait Abnormalities: Antalgic;Decreased step clearance;Shuffling gait (forward flexed posture) Right Side Weight Bearing: As tolerated Left Side Weight Bearing: Full Base of Support: Widened Stance: Right decreased Speed/Juliana: Shuffled Step Length: Right shortened;Left shortened Swing Pattern: Right asymmetrical 
Pain: 
Pain Scale 1: Numeric (0 - 10) Pain Intensity 1: 8 Pain Location 1: Knee Pain Orientation 1: Right Pain Description 1: Aching Pain Intervention(s) 1: Medication (see MAR); Cold pack; Distraction; Emotional support Activity Tolerance:  
Limited by orthostatic hypotension, unable to transfer to chair Please refer to the flowsheet for vital signs taken during this treatment.  
After treatment:  
[] Patient left in no apparent distress sitting up in chair 
[x] Patient left in no apparent distress in bed 
[x] Call bell left within reach [x] Nursing notified 
[] Caregiver present 
[] Bed alarm activated COMMUNICATION/COLLABORATION:  
The patients plan of care was discussed with: Registered Nurse and OT Madeline Sidhu, PT, DPT Time Calculation: 25 mins

## 2018-08-01 LAB — HGB BLD-MCNC: 7.9 G/DL (ref 11.5–16)

## 2018-08-01 PROCEDURE — 97110 THERAPEUTIC EXERCISES: CPT

## 2018-08-01 PROCEDURE — 36430 TRANSFUSION BLD/BLD COMPNT: CPT

## 2018-08-01 PROCEDURE — 77030011256 HC DRSG MEPILEX <16IN NO BORD MOLN -A

## 2018-08-01 PROCEDURE — 51798 US URINE CAPACITY MEASURE: CPT

## 2018-08-01 PROCEDURE — 65270000029 HC RM PRIVATE

## 2018-08-01 PROCEDURE — 97530 THERAPEUTIC ACTIVITIES: CPT

## 2018-08-01 PROCEDURE — 36415 COLL VENOUS BLD VENIPUNCTURE: CPT | Performed by: PHYSICIAN ASSISTANT

## 2018-08-01 PROCEDURE — P9016 RBC LEUKOCYTES REDUCED: HCPCS | Performed by: ORTHOPAEDIC SURGERY

## 2018-08-01 PROCEDURE — 74011250637 HC RX REV CODE- 250/637: Performed by: PHYSICIAN ASSISTANT

## 2018-08-01 PROCEDURE — 85018 HEMOGLOBIN: CPT | Performed by: PHYSICIAN ASSISTANT

## 2018-08-01 PROCEDURE — 74011250637 HC RX REV CODE- 250/637: Performed by: NURSE PRACTITIONER

## 2018-08-01 RX ORDER — SODIUM CHLORIDE 9 MG/ML
250 INJECTION, SOLUTION INTRAVENOUS AS NEEDED
Status: DISCONTINUED | OUTPATIENT
Start: 2018-08-01 | End: 2018-08-03 | Stop reason: HOSPADM

## 2018-08-01 RX ADMIN — OXYCODONE HYDROCHLORIDE 5 MG: 5 TABLET ORAL at 18:57

## 2018-08-01 RX ADMIN — OXYCODONE HYDROCHLORIDE 5 MG: 5 TABLET ORAL at 09:23

## 2018-08-01 RX ADMIN — Medication 10 ML: at 22:00

## 2018-08-01 RX ADMIN — Medication 10 ML: at 06:00

## 2018-08-01 RX ADMIN — ASPIRIN 81 MG: 81 TABLET, DELAYED RELEASE ORAL at 09:22

## 2018-08-01 RX ADMIN — ACETAMINOPHEN 1000 MG: 500 TABLET, FILM COATED ORAL at 00:00

## 2018-08-01 RX ADMIN — ACETAMINOPHEN 1000 MG: 500 TABLET, FILM COATED ORAL at 12:32

## 2018-08-01 RX ADMIN — GABAPENTIN 300 MG: 300 CAPSULE ORAL at 09:22

## 2018-08-01 RX ADMIN — Medication 10 ML: at 13:48

## 2018-08-01 RX ADMIN — METOPROLOL SUCCINATE 25 MG: 25 TABLET, EXTENDED RELEASE ORAL at 17:36

## 2018-08-01 RX ADMIN — ACETAMINOPHEN 1000 MG: 500 TABLET, FILM COATED ORAL at 17:36

## 2018-08-01 RX ADMIN — ALLOPURINOL 150 MG: 100 TABLET ORAL at 09:23

## 2018-08-01 RX ADMIN — DOCUSATE SODIUM AND SENNOSIDES 1 TABLET: 8.6; 5 TABLET, FILM COATED ORAL at 17:36

## 2018-08-01 RX ADMIN — GABAPENTIN 300 MG: 300 CAPSULE ORAL at 17:36

## 2018-08-01 RX ADMIN — OXYCODONE HYDROCHLORIDE 5 MG: 5 TABLET ORAL at 12:32

## 2018-08-01 RX ADMIN — CLOPIDOGREL BISULFATE 75 MG: 75 TABLET ORAL at 09:23

## 2018-08-01 NOTE — PROGRESS NOTES
Bedside shift change report given to Rupinder Browne RN (oncoming nurse) by KIKE Simon RN (offgoing nurse). Report included the following information SBAR, Kardex and Recent Results.

## 2018-08-01 NOTE — PROGRESS NOTES
4:28 PM 
Bedside and Verbal shift change report given to Madie Sauceda, [facility] (oncoming nurse) by Ross Serrato RN (offgoing nurse). Report included the following information SBAR, Kardex, OR Summary, Intake/Output, MAR and Recent Results.

## 2018-08-01 NOTE — PROGRESS NOTES
Occupational Therapy Note: 
 
Chart reviewed, spoke with pt's nurse who reported pt currently receiving blood transfusion. Will defer and attempt again tomorrow. Thank you.  
 
Vishal Monson OTR/L

## 2018-08-01 NOTE — ROUTINE PROCESS
Bedside shift change report given to Trisha (oncoming nurse) by Deric Ochoa (offgoing nurse). Report included the following information SBAR.

## 2018-08-01 NOTE — PROGRESS NOTES
Problem: Mobility Impaired (Adult and Pediatric) Goal: *Acute Goals and Plan of Care (Insert Text) Physical Therapy Goals Initiated 7/31/2018 1. Patient will move from supine to sit and sit to supine , scoot up and down and roll side to side in bed with minimal assistance within 4 days. 2. Patient will perform sit to stand with minimal assistance within 4 days. 3. Patient will ambulate with minimal assistance for 50 feet with the least restrictive device within 4 days. 4. Patient will perform home exercise program per protocol with independence within 4 days. 5. Patient will demonstrate AROM 0-90 degrees in operative joint within 4 days. physical Therapy TREATMENT Patient: Henrietta Espinosa (13 y.o. female) Date: 8/1/2018 Diagnosis: OSTEOARTHRITIS RIGHT KNEE Osteoarthritis of right knee Osteoarthritis of right knee Procedure(s) (LRB): 
RIGHT TOTAL KNEE REPLACEMENT (Right) 2 Days Post-Op Precautions: Fall, WBAT Chart, physical therapy assessment, plan of care and goals were reviewed. ASSESSMENT: 
Pt received supine in bed, semifowlers, advising less nausea today. Monitored BP throughout session due to orthostatic symptoms (limiting activity) observed during therapy yesterday. Performed supine exercises as per protocol demonstrating limited quad activation and decreased knee flexion. Supine to sit EOB, requiring mod-max A x2 at LEs and trunk. (Pt later demonstrated increased participation when moving to L side of bed). Sit to stand, using RW and mod Ax2, VC to decrease posterior hip translation and maintain more upright posture. Impaired standing balance due to decreased tolerance for WB on RLE. Pt requested visual assessment of R posterior knee due to possible damage to superficial integument. Nothing of note upon observation. Activity limited by pain and pt requested to return to bed.  Pt unable to side step and slid feet along floor toward head of bed, with RW and step to, requiring max cues for sequencing. Pt returned to supine to finish bathing with PCT, call bell within reach. Recommend discharge to SNF due to RLE weakness and limitations in functional mobility. Progression toward goals: 
[]      Improving appropriately and progressing toward goals [x]      Improving slowly and progressing toward goals 
[]      Not making progress toward goals and plan of care will be adjusted PLAN: 
Patient continues to benefit from skilled intervention to address the above impairments. Continue treatment per established plan of care. Discharge Recommendations:  Nicholas Jade Further Equipment Recommendations for Discharge:  TBD, pt owns rollator SUBJECTIVE:  
Patient stated I feel better than yesterday. I got some sleep.  OBJECTIVE DATA SUMMARY:  
Critical Behavior: 
Neurologic State: Alert Orientation Level: Oriented X4 Cognition: Follows commands, Appropriate decision making, Appropriate for age attention/concentration, Appropriate safety awareness Safety/Judgement: Good awareness of safety precautions Range of Motion: 
  
  
  
  
RLE PROM 
R Knee Extension: -11 Functional Mobility Training: 
Bed Mobility: 
  
Supine to Sit: Moderate assistance;Assist x2 Sit to Supine: Total assistance;Assist x2 Scooting: Minimum assistance; Moderate assistance; Additional time Transfers: 
Sit to Stand: Moderate assistance;Assist x2 Stand to Sit: Minimum assistance Balance: 
Sitting: Intact Standing: Impaired; With support Standing - Static: Fair;Poor Standing - Dynamic : Poor Ambulation/Gait Training: 
  
  
  
  
  
  
  
  
  
  
  
  
  
  
  
  
  
  
  
  
Therapeutic Exercises:  
 
EXERCISE Sets Reps Active Active Assist  
Passive Self ROM Comments Ankle Pumps  10 [x]                                        []                                        []                                        [] Quad Sets  10 []                                        []                                        []                                        []                                        Trace Hamstring Sets   []                                        []                                        []                                        []                                          
Short Arc Quads   []                                        []                                        []                                        []                                          
Knee Extension Stretch    
[]                                         
[]                                        1 min (ankle elevated on pillow) Heel Slides  10 []                                        []                                        [x]                                        []                                          
Long Arc Quads   []                                        []                                        []                                        []                                          
Knee Flexion Stretch   []                                        []                                        []                                        []                                          
Straight Leg Raises   []                                        []                                        []                                        []                                          
 
Pain: 
Pain Scale 1: Numeric (0 - 10) Pain Intensity 1: unable to quantify Pain Location 1: Knee Pain Orientation 1: Right Pain Description 1: Aching Pain Intervention(s) 1: Medication (see MAR); Emotional support Activity Tolerance:  
Limited by pain Please refer to the flowsheet for vital signs taken during this treatment.  
After treatment:  
[] Patient left in no apparent distress sitting up in chair 
[x] Patient left in no apparent distress in bed 
[x] Call bell left within reach [x] Nursing notified 
[] Caregiver present 
[] Bed alarm activated COMMUNICATION/COLLABORATION:  
The patients plan of care was discussed with: Registered Nurse Nazia Granda Start: 10:00 End: 10:35 Regarding student involvement in patient care: A student participated in this treatment session. Per CMS Medicare statements and APTA guidelines I certify that the following was true: 1. I was present and directly observed the entire session. 2. I made all skilled judgments and clinical decisions regarding care. 3. I am the practitioner responsible for assessment, treatment, and documentation.

## 2018-08-01 NOTE — WOUND CARE
Wound Care Note:  
 
New consult placed by nurse request for painful abrasion to posterior knee from fall Chart shows: 
Admitted for osteoarthritis of right knee s/p right total knee replacement Past Medical History:  
Diagnosis Date  Breast cancer ([facility] [address] 1997  
 lumpectomy, RADIATION  
 Cancer [facility]) 2002  
 dcis left breast 2002  Cardiomyopathy ([facility] [address]  Chronic pain  Coronary artery disease involving native coronary artery of native heart without angina pectoris 12/16/2016  
 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  
Patient is A&O x 4, communicative, continent with some assistance needed in repositioning. Bed: Advance Diet: Regular Bilateral buttocks, and sacral skin intact and without erythema. Bilateral heels skin intact with blanchable erythema 1. Right lateral lower leg has some serous blisters to the lateral aspect of the incision, serous blisters are intact, area measures 10 cm x 3 cm with approximately 6 blisters, there is intact skin in the measurements, no drainage, wound edges are closed, jak-wound intact. There appears to be two blisters under the dressing as well. Xeroform gauze and foam dressings applied. 2. No skin breakdown noted behind right knee. Dr. Efren Goldmann in surgery; will obtain orders tomorrow. Patient repositioned supine. Heels offloaded on pillow. Recommendations:   
Right lateral lower leg- Every other day gently cleanse, apply Xeroform gauze that has been folded in half and cover with foam dressings. Skin Care & Pressure Prevention: 
Minimize layers of linen/pads under patient to optimize support surface. Turn/reposition approximately every 2 hours and offload heels. Manage incontinence / promote continence Discussed above plan with patient & Dyana Schumacher RN Transition of Care: Plan to follow as needed while admitted to hospital. 
 
JOHN Nowak, RN, [facility] 
office [phone] 
pager [phone] or call  to page

## 2018-08-01 NOTE — PROGRESS NOTES
Problem: Mobility Impaired (Adult and Pediatric) Goal: *Acute Goals and Plan of Care (Insert Text) Physical Therapy Goals Initiated 7/31/2018 1. Patient will move from supine to sit and sit to supine , scoot up and down and roll side to side in bed with minimal assistance within 4 days. 2. Patient will perform sit to stand with minimal assistance within 4 days. 3. Patient will ambulate with minimal assistance for 50 feet with the least restrictive device within 4 days. 4. Patient will perform home exercise program per protocol with independence within 4 days. 5. Patient will demonstrate AROM 0-90 degrees in operative joint within 4 days. physical Therapy TREATMENT Patient: John Huber (87 y.o. female) Date: 8/1/2018 Diagnosis: OSTEOARTHRITIS RIGHT KNEE Osteoarthritis of right knee Osteoarthritis of right knee Procedure(s) (LRB): 
RIGHT TOTAL KNEE REPLACEMENT (Right) 2 Days Post-Op Precautions: Fall, WBAT Chart, physical therapy assessment, plan of care and goals were reviewed. ASSESSMENT: 
Pt received supine in bed, HOB elevated, receiving 2 units of blood due to drop in Hgb levels. Performed supine exercises in bed as per protocol, HS set substituted for heelslides due to c/o of pain and weakness in attempting R knee flexion. Pt deferred knee extension stretch this afternoon due to pain. Pt positioned to comfort, all needs within reach, ice applied to R knee. Will resume bed mobility, transfer, and gait training tomorrow. Recommend discharge to SNF to address RLE weakness and limitations in functional mobility. Progression toward goals: 
[]      Improving appropriately and progressing toward goals [x]      Improving slowly and progressing toward goals 
[]      Not making progress toward goals and plan of care will be adjusted PLAN: 
Patient continues to benefit from skilled intervention to address the above impairments. Continue treatment per established plan of care.  
Discharge Recommendations:  Nicholas Jade Further Equipment Recommendations for Discharge:  TBD SUBJECTIVE:  
Patient stated I feel that in the back of the fatty part of my leg.  [regarding HS sets] OBJECTIVE DATA SUMMARY:  
Critical Behavior: 
Neurologic State: Alert Orientation Level: Oriented X4 Cognition: Follows commands, Appropriate decision making, Appropriate for age attention/concentration, Appropriate safety awareness Safety/Judgement: Good awareness of safety precautions Range of Motion: 
  
  
  
  
RLE PROM 
R Knee Extension: -11 Functional Mobility Training: 
Bed Mobility: 
  
  
  
Transfers: 
     
  
     
  
  
  
  
Balance: 
 
Ambulation/Gait Training: 
  
  
  
  
  
  
  
  
  
  
  
  
  
  
  
  
  
  
  
  
Therapeutic Exercises:  
 
EXERCISE Sets Reps Active Active Assist  
Passive Self ROM Comments Ankle Pumps  10 [x]                                        []                                        []                                        []                                          
Quad Sets  10 [x]                                        []                                        []                                        []                                          
Hamstring Sets  10 [x]                                        []                                        []                                        []                                          
Short Arc Quads   []                                        []                                        []                                        []                                          
Knee Extension Stretch    
[]                                         
Heel Slides   []                                        []                                        [] []                                          
Long Arc Quads   []                                        []                                        []                                        []                                          
Knee Flexion Stretch   []                                        []                                        []                                        []                                          
Straight Leg Raises   []                                        []                                        []                                        []                                          
 
Pain: 
Pain Scale 1: Numeric (0 - 10) Pain Intensity 1: unable to quantify Pain Location 1: Knee Pain Orientation 1: Right Pain Description 1: Aching Pain Intervention(s) 1: Medication (see MAR); Ice;Family Support; Rest 
Activity Tolerance:  
Limited by pain; pt was receiving 2 units of blood (supine exercises only) Please refer to the flowsheet for vital signs taken during this treatment. After treatment:  
[] Patient left in no apparent distress sitting up in chair 
[x] Patient left in no apparent distress in bed 
[x] Call bell left within reach [x] Nursing notified 
[] Caregiver present 
[] Bed alarm activated COMMUNICATION/COLLABORATION:  
The patients plan of care was discussed with: Registered Nurse Abimael Coleman Start: 12:43 End: 13:01 Regarding student involvement in patient care: A student participated in this treatment session. Per CMS Medicare statements and APTA guidelines I certify that the following was true: 1. I was present and directly observed the entire session. 2. I made all skilled judgments and clinical decisions regarding care. 3. I am the practitioner responsible for assessment, treatment, and documentation.

## 2018-08-01 NOTE — PROGRESS NOTES
Complaints: dizziness, fatigue Events: none GEN:  NAD. AOx3 ABD:  S/NT/ND  
RLE:  Dressing C/D/I  
 5/5 motor Calf nttp (Bilat) Sensate all distribution to light touch 1+ dp/pt pulses, foot perfused Lab Results Component Value Date/Time HGB 7.9 (L) 08/01/2018 06:04 AM  
 INR 1.0 07/19/2018 02:23 PM  
 
 
Lab Results Component Value Date/Time Sodium 140 07/31/2018 02:46 AM  
 Potassium 3.9 07/31/2018 02:46 AM  
 Chloride 107 07/31/2018 02:46 AM  
 CO2 26 07/31/2018 02:46 AM  
 BUN 23 (H) 07/31/2018 02:46 AM  
 Creatinine 1.25 (H) 07/31/2018 02:46 AM  
 Calcium 8.2 (L) 07/31/2018 02:46 AM  
 Magnesium 1.8 09/08/2014 12:05 PM  
 
 
 
  
POD#2 RIGHT TOTAL KNEE REPLACEMENT. Doing Well today. Patient states she slept better last and is feeling a little better today. Hgb has dropped; 2 units of blood ordered now. Blood pressure medications have been restarted. We will see how PT goes today and make a plan discharge plan based off that. Likely discharge Friday. ABX: Complete today PATHWAY: D/C [facility] per protocol DVT Prophylaxis: ASA & Plavix Weight Bearing: WBAT RLE Pain Control: PRN oral narcotics, celebrex Anticipated Discharge Date: pending [facility]

## 2018-08-02 LAB
ABO + RH BLD: NORMAL
BLD PROD TYP BPU: NORMAL
BLD PROD TYP BPU: NORMAL
BLOOD GROUP ANTIBODIES SERPL: NORMAL
BPU ID: NORMAL
BPU ID: NORMAL
CROSSMATCH RESULT,%XM: NORMAL
CROSSMATCH RESULT,%XM: NORMAL
GLUCOSE BLD STRIP.AUTO-MCNC: 224 MG/DL (ref 65–100)
HGB BLD-MCNC: 9.7 G/DL (ref 11.5–16)
SERVICE CMNT-IMP: ABNORMAL
SPECIMEN EXP DATE BLD: NORMAL
STATUS OF UNIT,%ST: NORMAL
STATUS OF UNIT,%ST: NORMAL
UNIT DIVISION, %UDIV: 0
UNIT DIVISION, %UDIV: 0

## 2018-08-02 PROCEDURE — 74011250637 HC RX REV CODE- 250/637: Performed by: NURSE PRACTITIONER

## 2018-08-02 PROCEDURE — 82962 GLUCOSE BLOOD TEST: CPT

## 2018-08-02 PROCEDURE — 36415 COLL VENOUS BLD VENIPUNCTURE: CPT | Performed by: NURSE PRACTITIONER

## 2018-08-02 PROCEDURE — 85018 HEMOGLOBIN: CPT | Performed by: NURSE PRACTITIONER

## 2018-08-02 PROCEDURE — 97530 THERAPEUTIC ACTIVITIES: CPT

## 2018-08-02 PROCEDURE — 65270000029 HC RM PRIVATE

## 2018-08-02 PROCEDURE — 74011250637 HC RX REV CODE- 250/637: Performed by: PHYSICIAN ASSISTANT

## 2018-08-02 RX ORDER — OXYCODONE HYDROCHLORIDE 5 MG/1
5-10 TABLET ORAL
Qty: 80 TAB | Refills: 0 | Status: SHIPPED | OUTPATIENT
Start: 2018-08-02 | End: 2018-09-21

## 2018-08-02 RX ORDER — ADHESIVE BANDAGE
30 BANDAGE TOPICAL ONCE
Status: COMPLETED | OUTPATIENT
Start: 2018-08-02 | End: 2018-08-02

## 2018-08-02 RX ADMIN — DOCUSATE SODIUM AND SENNOSIDES 1 TABLET: 8.6; 5 TABLET, FILM COATED ORAL at 09:38

## 2018-08-02 RX ADMIN — Medication 10 ML: at 22:26

## 2018-08-02 RX ADMIN — Medication 10 ML: at 14:00

## 2018-08-02 RX ADMIN — ACETAMINOPHEN 1000 MG: 500 TABLET, FILM COATED ORAL at 03:12

## 2018-08-02 RX ADMIN — MAGNESIUM HYDROXIDE 30 ML: 400 SUSPENSION ORAL at 22:20

## 2018-08-02 RX ADMIN — BUMETANIDE 2 MG: 1 TABLET ORAL at 09:37

## 2018-08-02 RX ADMIN — OXYCODONE HYDROCHLORIDE 5 MG: 5 TABLET ORAL at 02:14

## 2018-08-02 RX ADMIN — OXYCODONE HYDROCHLORIDE 5 MG: 5 TABLET ORAL at 07:01

## 2018-08-02 RX ADMIN — ACETAMINOPHEN 1000 MG: 500 TABLET, FILM COATED ORAL at 18:43

## 2018-08-02 RX ADMIN — ASPIRIN 81 MG: 81 TABLET, DELAYED RELEASE ORAL at 09:37

## 2018-08-02 RX ADMIN — METOPROLOL SUCCINATE 25 MG: 25 TABLET, EXTENDED RELEASE ORAL at 17:16

## 2018-08-02 RX ADMIN — DOCUSATE SODIUM AND SENNOSIDES 1 TABLET: 8.6; 5 TABLET, FILM COATED ORAL at 17:16

## 2018-08-02 RX ADMIN — GABAPENTIN 300 MG: 300 CAPSULE ORAL at 17:16

## 2018-08-02 RX ADMIN — GABAPENTIN 300 MG: 300 CAPSULE ORAL at 09:39

## 2018-08-02 RX ADMIN — ACETAMINOPHEN 1000 MG: 500 TABLET, FILM COATED ORAL at 12:55

## 2018-08-02 RX ADMIN — ACETAMINOPHEN 1000 MG: 500 TABLET, FILM COATED ORAL at 07:01

## 2018-08-02 RX ADMIN — ALLOPURINOL 150 MG: 100 TABLET ORAL at 09:38

## 2018-08-02 RX ADMIN — CLOPIDOGREL BISULFATE 75 MG: 75 TABLET ORAL at 09:39

## 2018-08-02 RX ADMIN — OXYCODONE HYDROCHLORIDE 5 MG: 5 TABLET ORAL at 09:48

## 2018-08-02 RX ADMIN — TRAMADOL HYDROCHLORIDE 50 MG: 50 TABLET, FILM COATED ORAL at 18:50

## 2018-08-02 NOTE — PROGRESS NOTES
The patient will discharge today to [facility] [address] [place] [address] via [facility] at 1pm. The discharge folder on the chart with report #. [id number] 
 
9:41AM CRM noted that the discharge order is form tomorrow 8/3. [facility] transfer is scheduled for tomorrow at 1pm. The patient and facility are aware.  KJT

## 2018-08-02 NOTE — PROGRESS NOTES
Bedside and Verbal shift change report given to Maura Macdonald, RN (oncoming nurse) by Roxann Ugarte RN (offgoing nurse). Report included the following information SBAR, Kardex, Procedure Summary, Intake/Output, MAR, Accordion and Recent Results.

## 2018-08-02 NOTE — PROGRESS NOTES
12:53 PM 
Pt c/o dizziness and blurry vision. BP 1127/86. Pt ate 50% of lunch. Will continue to monitor patient.

## 2018-08-02 NOTE — PROGRESS NOTES
Problem: Mobility Impaired (Adult and Pediatric) Goal: *Acute Goals and Plan of Care (Insert Text) Physical Therapy Goals Initiated 7/31/2018 1. Patient will move from supine to sit and sit to supine , scoot up and down and roll side to side in bed with minimal assistance within 4 days. 2. Patient will perform sit to stand with minimal assistance within 4 days. 3. Patient will ambulate with minimal assistance for 50 feet with the least restrictive device within 4 days. 4. Patient will perform home exercise program per protocol with independence within 4 days. 5. Patient will demonstrate AROM 0-90 degrees in operative joint within 4 days. physical Therapy TREATMENT Patient: Vipul Fowler (36 y.o. female) Date: 8/2/2018 Diagnosis: OSTEOARTHRITIS RIGHT KNEE Osteoarthritis of right knee Osteoarthritis of right knee Procedure(s) (LRB): 
RIGHT TOTAL KNEE REPLACEMENT (Right) 3 Days Post-Op Precautions: Fall, WBAT Chart, physical therapy assessment, plan of care and goals were reviewed. ASSESSMENT: 
Pt received supine in bed, HOB elevated, c/o pain in R knee. Pt reports feeling better following transfusion yesterday. Pt requested to use bedpan, but was agreeable to attempt transfer to bedside commode. Pt able to initiate bed mobility but requires mod-max Ax2 at legs and trunk for supine to sit (as limited by pain and weakness). Performed sit to stand from EOB, VC to decrease excessive forward flexion upon standing. Pt transferred from bed to [facility] (positioned adjacent to EOB) with min-modAx2, VC for postioning of UE on armrests to ensure safe descent. Pt able to transfer from [facility] to chair positioned approx 2 feet away. Pt demonstrated greater step length and increased WB on RLE with ambulation to chair. Pt returned to chair and positioned to comfort, all needs within reach, ice applied to R knee. Recommend SNF upon discharge to progress ambulation and functional mobility.   
 
Progression toward goals: 
[]      Improving appropriately and progressing toward goals [x]      Improving slowly and progressing toward goals 
[]      Not making progress toward goals and plan of care will be adjusted PLAN: 
Patient continues to benefit from skilled intervention to address the above impairments. Continue treatment per established plan of care. Discharge Recommendations:  Nicholas Jade Further Equipment Recommendations for Discharge:  TBD SUBJECTIVE:  
Patient stated Let me do what I can.  OBJECTIVE DATA SUMMARY:  
Critical Behavior: 
Neurologic State: Alert Orientation Level: Oriented X4 Cognition: Appropriate decision making, Follows commands Safety/Judgement: Good awareness of safety precautions Range of Motion: 
  
  
  
  
  
  
  
  
Functional Mobility Training: 
Bed Mobility: 
  
Supine to Sit: Moderate assistance; Additional time;Assist x2 Sit to Supine:  (NT) Scooting: Moderate assistance; Additional time Transfers: 
Sit to Stand: Moderate assistance; Additional time;Assist x2 Stand to Sit: Moderate assistance; Additional time Bed to Chair: Minimum assistance; Adaptive equipment;Assist x2 Balance: 
Sitting: Intact Standing: Impaired; With support Standing - Static: Fair Standing - Dynamic : Poor Ambulation/Gait Training: 
Distance (ft): 3 Feet (ft) Assistive Device: Gait belt;Walker, rolling Ambulation - Level of Assistance: Moderate assistance; Adaptive equipment;Assist x2 Gait Abnormalities: Antalgic;Decreased step clearance Base of Support: Widened Stance: Right decreased Speed/Juliana: Shuffled; Slow Step Length: Left shortened;Right shortened Therapeutic Exercises:  
 
EXERCISE Sets Reps Active Active Assist  
Passive Self ROM Comments Ankle Pumps   []                                        []                                        []                                        [] Quad Sets   []                                        []                                        []                                        []                                          
Hamstring Sets   []                                        []                                        []                                        []                                          
Short Arc Quads   []                                        []                                        []                                        []                                          
Knee Extension Stretch    
[]                                         
Heel Slides   []                                        []                                        []                                        []                                          
Long Arc Quads   []                                        []                                        []                                        []                                          
Knee Flexion Stretch   []                                        []                                        []                                        []                                          
Straight Leg Raises   []                                        []                                        []                                        []                                          
 
Pain: 
Pain Scale 1: Numeric (0 - 10) Pain Intensity 1: 5 Pain Location 1: Knee Pain Orientation 1: Anterior Pain Description 1: Aching Pain Intervention(s) 1: Medication (see MAR); Rest 
Activity Tolerance:  
Limited by pain Please refer to the flowsheet for vital signs taken during this treatment.  
After treatment:  
[x] Patient left in no apparent distress sitting up in chair 
[] Patient left in no apparent distress in bed 
[x] Call bell left within reach [x] Nursing notified 
[] Caregiver present 
[] Bed alarm activated COMMUNICATION/COLLABORATION:  
The patients plan of care was discussed with: Registered Nurse Jaky Hanna Start: 09:46 End: 10:14 Regarding student involvement in patient care: A student participated in this treatment session. Per CMS Medicare statements and APTA guidelines I certify that the following was true: 1. I was present and directly observed the entire session. 2. I made all skilled judgments and clinical decisions regarding care. 3. I am the practitioner responsible for assessment, treatment, and documentation.

## 2018-08-02 NOTE — PROGRESS NOTES
1251 
Pt reports feeling dizziness and blurry vision. /86, RR 16, temp wdl, HR 80. Dr. Hernandez Rolling informed of pt's complaints, Dr assessed pt and advised RN to continue monitoring. Pt returned to bed from chair at that time.

## 2018-08-02 NOTE — PROGRESS NOTES
Problem: Mobility Impaired (Adult and Pediatric) Goal: *Acute Goals and Plan of Care (Insert Text) Physical Therapy Goals Initiated 7/31/2018 1. Patient will move from supine to sit and sit to supine , scoot up and down and roll side to side in bed with minimal assistance within 4 days. 2. Patient will perform sit to stand with minimal assistance within 4 days. 3. Patient will ambulate with minimal assistance for 50 feet with the least restrictive device within 4 days. 4. Patient will perform home exercise program per protocol with independence within 4 days. 5. Patient will demonstrate AROM 0-90 degrees in operative joint within 4 days. physical Therapy TREATMENT Patient: Darlin Marcus (48 y.o. female) Date: 8/2/2018 Diagnosis: OSTEOARTHRITIS RIGHT KNEE Osteoarthritis of right knee Osteoarthritis of right knee Procedure(s) (LRB): 
RIGHT TOTAL KNEE REPLACEMENT (Right) 3 Days Post-Op Precautions: Fall, WBAT Chart, physical therapy assessment, plan of care and goals were reviewed. ASSESSMENT: 
Pt requesting to use BSC on arrival. Pt was able to stand and have BSC place behind. Pt attempted gait post toileting. Pt with a difficult time weightbearing through right LE and unable to advance LE. Pt was able to pivot to bed with scooting foot. Pt limited by pain. Pt will need SNF at discharge. Progression toward goals: 
[x]    Improving appropriately and progressing toward goals 
[]    Improving slowly and progressing toward goals 
[]    Not making progress toward goals and plan of care will be adjusted PLAN: 
Patient continues to benefit from skilled intervention to address the above impairments. Continue treatment per established plan of care. Discharge Recommendations:  Nicholas Jade Further Equipment Recommendations for Discharge:  TBD SUBJECTIVE:  
Patient stated I need to go slow .  OBJECTIVE DATA SUMMARY:  
Critical Behavior: 
Neurologic State: Alert Orientation Level: Oriented X4 Cognition: Appropriate decision making, Follows commands Safety/Judgement: Good awareness of safety precautions Functional Mobility Training: 
Bed Mobility: 
  
Supine to Sit: Moderate assistance; Additional time;Assist x2 Sit to Supine:  (NT) Scooting: Moderate assistance; Additional time Transfers: 
Sit to Stand: Moderate assistance; Additional time;Assist x2 Stand to Sit: Moderate assistance; Additional time Bed to Chair: Minimum assistance; Adaptive equipment;Assist x2 Balance: 
Sitting: Intact Standing: Impaired; With support Standing - Static: Fair Standing - Dynamic : Poor Ambulation/Gait Training: 
Distance (ft): 3 Feet (ft) Assistive Device: Gait belt;Walker, rolling Ambulation - Level of Assistance: Moderate assistance; Adaptive equipment;Assist x2 Gait Abnormalities: Antalgic;Decreased step clearance Base of Support: Widened Stance: Right decreased Speed/Juliana: Shuffled; Slow Step Length: Left shortened;Right shortened Stairs: 
  
  
   
 
Neuro Re-Education: 
 
Therapeutic Exercises:  
 
Pain: 
Pain Scale 1: Numeric (0 - 10) Pain Intensity 1: 5 Pain Location 1: Knee Pain Orientation 1: Anterior Pain Description 1: Aching Pain Intervention(s) 1: Medication (see MAR); Rest 
Activity Tolerance:  
limited Please refer to the flowsheet for vital signs taken during this treatment. After treatment:  
[]    Patient left in no apparent distress sitting up in chair 
[x]    Patient left in no apparent distress in bed 
[x]    Call bell left within reach [x]    Nursing notified 
[x]    Caregiver present 
[]    Bed alarm activated COMMUNICATION/COLLABORATION:  
The patients plan of care was discussed with: Registered Nurse Joan Lane PTA Time Calculation: 20 mins

## 2018-08-02 NOTE — PROGRESS NOTES
Complaints: none Events: none GEN:  NAD. AOx3 ABD:  S/NT/ND  
RLE:  Dressing C/D/I  
 5/5 motor Calf nttp (Bilat) Sensate all distribution to light touch 1+ dp/pt pulses, foot perfused Lab Results Component Value Date/Time HGB 9.7 (L) 08/02/2018 03:17 AM  
 INR 1.0 07/19/2018 02:23 PM  
 
 
Lab Results Component Value Date/Time Sodium 140 07/31/2018 02:46 AM  
 Potassium 3.9 07/31/2018 02:46 AM  
 Chloride 107 07/31/2018 02:46 AM  
 CO2 26 07/31/2018 02:46 AM  
 BUN 23 (H) 07/31/2018 02:46 AM  
 Creatinine 1.25 (H) 07/31/2018 02:46 AM  
 Calcium 8.2 (L) 07/31/2018 02:46 AM  
 Magnesium 1.8 09/08/2014 12:05 PM  
 
 
 
  
POD #3 RIGHT TOTAL KNEE REPLACEMENT. Satisfactory progress. Patient is feeling much better today after transfusion. She continues to struggle to get out of bed due to pain. While in bed, pain is well-controlled. ABX: Complete PATHWAY: D/C Gabbie per protocol DVT Prophylaxis: ASA & Plavix Weight Bearing: WBAT RLE Pain Control: PRN oral narcotics, celebrex Anticipated Discharge Date: Flynn Ireland Disposition: [facility]

## 2018-08-02 NOTE — PROGRESS NOTES
6:16 PM 
Bedside and Verbal shift change report given to Richard Gama RN (oncoming nurse) by Dyana Schumacher RN (offgoing nurse). Report included the following information SBAR, Kardex, OR Summary, Intake/Output, MAR and Recent Results.

## 2018-08-02 NOTE — PROGRESS NOTES
Bedside and Verbal shift change report given to Amee Mayfield RN (oncoming nurse) by Kathy Jama RN (offgoing nurse). Report included the following information SBAR, Kardex, Procedure Summary, Intake/Output, MAR and Recent Results.

## 2018-08-03 VITALS
BODY MASS INDEX: 29.43 KG/M2 | TEMPERATURE: 98.5 F | SYSTOLIC BLOOD PRESSURE: 109 MMHG | OXYGEN SATURATION: 97 % | HEIGHT: 60 IN | HEART RATE: 66 BPM | DIASTOLIC BLOOD PRESSURE: 68 MMHG | RESPIRATION RATE: 14 BRPM | WEIGHT: 149.91 LBS

## 2018-08-03 PROCEDURE — 94760 N-INVAS EAR/PLS OXIMETRY 1: CPT

## 2018-08-03 PROCEDURE — 74011250637 HC RX REV CODE- 250/637: Performed by: PHYSICIAN ASSISTANT

## 2018-08-03 PROCEDURE — 74011250637 HC RX REV CODE- 250/637: Performed by: NURSE PRACTITIONER

## 2018-08-03 RX ADMIN — CLOPIDOGREL BISULFATE 75 MG: 75 TABLET ORAL at 08:35

## 2018-08-03 RX ADMIN — POLYETHYLENE GLYCOL 3350 17 G: 17 POWDER, FOR SOLUTION ORAL at 09:00

## 2018-08-03 RX ADMIN — DOCUSATE SODIUM AND SENNOSIDES 1 TABLET: 8.6; 5 TABLET, FILM COATED ORAL at 08:35

## 2018-08-03 RX ADMIN — ACETAMINOPHEN 1000 MG: 500 TABLET, FILM COATED ORAL at 13:03

## 2018-08-03 RX ADMIN — Medication 10 ML: at 05:20

## 2018-08-03 RX ADMIN — ACETAMINOPHEN 1000 MG: 500 TABLET, FILM COATED ORAL at 05:20

## 2018-08-03 RX ADMIN — GABAPENTIN 300 MG: 300 CAPSULE ORAL at 08:35

## 2018-08-03 RX ADMIN — ASPIRIN 81 MG: 81 TABLET, DELAYED RELEASE ORAL at 08:35

## 2018-08-03 RX ADMIN — ALLOPURINOL 150 MG: 100 TABLET ORAL at 08:35

## 2018-08-03 NOTE — PROGRESS NOTES
PT clearance per N/ACorry set up per CRM, DC order per [employer] I have reviewed discharge instructions with the patient. The patient verbalized understanding. All belongings packed and sent to DC; phone wallet keys glasses prescriptions, instructions. IV removed. [facility] picked up pt at approx 1325. Pt left with prescription for oxycodone

## 2018-08-03 NOTE — PROGRESS NOTES
Complaints: None Events: None GEN:  NAD. AOx3 ABD:  S/NT/ND  
RLE:  Dressing C/D/I  
 5/5 motor Calf nttp (Bilat) Sensate all distribution to light touch 1+ dp/pt pulses, foot perfused Lab Results Component Value Date/Time HGB 9.7 (L) 08/02/2018 03:17 AM  
 INR 1.0 07/19/2018 02:23 PM  
 
 
Lab Results Component Value Date/Time Sodium 140 07/31/2018 02:46 AM  
 Potassium 3.9 07/31/2018 02:46 AM  
 Chloride 107 07/31/2018 02:46 AM  
 CO2 26 07/31/2018 02:46 AM  
 BUN 23 (H) 07/31/2018 02:46 AM  
 Creatinine 1.25 (H) 07/31/2018 02:46 AM  
 Calcium 8.2 (L) 07/31/2018 02:46 AM  
 Magnesium 1.8 09/08/2014 12:05 PM  
 
 
 
  
POD #3 RIGHT TOTAL KNEE REPLACEMENT. Satisfactory progress. ABX: Complete PATHWAY: D/C Gabbie per protocol DVT Prophylaxis: ASA Plavix Weight Bearing: WBAT RLE Pain Control: PRN oral narcotics, celebrex Anticipated Discharge Date: Today Disposition: [facility]

## 2018-08-03 NOTE — ROUTINE PROCESS
Bedside shift change report given to [address] (oncoming nurse) by Natalee Romo (offgoing nurse). Report included the following information SBAR.

## 2018-08-03 NOTE — DISCHARGE SUMMARY
@8QBNP@ [address] [place] [address]    DISCHARGE SUMMARY     Patient: Marzena Padilla                             Medical Record Number: [id number]                : 1933  Age: 80 y.o. Admit Date: 2018  Discharge Date:   Admission Diagnosis: OSTEOARTHRITIS RIGHT KNEE  Osteoarthritis of right knee  Discharge Diagnosis: OSTEOARTHRITIS RIGHT KNEE  Procedures: Procedure(s):  RIGHT TOTAL KNEE REPLACEMENT  Surgeon: Brandin Luna  Assistants: Paolo  Anesthesia: spinal  Complications: None     History of Present Illness:  Marzena Padilla is a 80 y.o. female with a history of Right knee pain, swelling, and marked loss of function. Despite conservative management and after clinical and radiographic evaluation, it was determined that she suffered from end-stage osteoarthritis and would benefit from Procedure(s):  RIGHT TOTAL KNEE REPLACEMENT, which she consented to undergo after a discussion of the risks, benefits, alternatives, rehab concerns, and potential complications of surgery. Hospital Course:  Marzena Padilla tolerated the procedure well. She was transferred  to the recovery room in stable condition. After a brief stay the patient was then transferred to the Joint Replacement Unit at [address] [place] [address].  On postoperative day #1, the dressing was clean and dry, she was neurovascularly intact. The patient was afebrile and vital signs were stable. Calves were soft and non-tender bilaterally. On postoperative day  # 2, the patient was tolerating a regular diet and making satisfactory progress with physical therapy. Hemoglobin and INR prior to discharge were   Lab Results   Component Value Date/Time    HGB 9.7 (L) 2018 03:17 AM    INR 1.0 2018 02:23 PM   .  Marzena Padilla made satisfactory progress with physical therapy and was discharged to SNF in stable condition on postoperative day 3.   She was provided with routine postoperative instructions and advised to follow up in my office in 3 weeks following discharge from the hospital.  She was prescribed ASA and Plavix for DVT prophylaxis and oxycodone for post-operative pain. Discharge Medications:  Current Discharge Medication List      START taking these medications    Details   oxyCODONE IR (ROXICODONE) 5 mg immediate release tablet Take 1-2 Tabs by mouth every four (4) hours as needed. Max Daily Amount: 60 mg.  Qty: 80 Tab, Refills: 0    Associated Diagnoses: Primary osteoarthritis of right knee         CONTINUE these medications which have NOT CHANGED    Details   multivitamin (ONE A DAY) tablet Take 1 Tab by mouth daily. bumetanide (BUMEX) 2 mg tablet Take 2 mg by mouth daily. Refills: 3      metoprolol succinate (TOPROL-XL) 50 mg XL tablet TAKE 1&1/2 TABLETS BY MOUTH EVERY HS  Refills: 3      aspirin delayed-release 81 mg tablet Take 81 mg by mouth daily. gabapentin (NEURONTIN) 300 mg capsule TAKE 1 CAPSULE BY MOUTH THREE TIMES A DAY  Qty: 270 Cap, Refills: 4      allopurinol (ZYLOPRIM) 300 mg tablet Take 1/2 tablet by mouth daily. For tophaceous gout  Qty: 45 Tab, Refills: 4      atorvastatin (LIPITOR) 20 mg tablet Take 20 mg by mouth nightly. lisinopril (PRINIVIL, ZESTRIL) 10 mg tablet Take 10 mg by mouth. nitroglycerin (NITROSTAT) 0.4 mg SL tablet every five (5) minutes as needed. clopidogrel (PLAVIX) 75 mg tab Take 75 mg by mouth daily. triamcinolone acetonide (KENALOG) 0.025 % topical cream Apply  to affected area.          STOP taking these medications       cholecalciferol (VITAMIN D3) 1,000 unit cap Comments:   Reason for Stopping:         traMADol (ULTRAM) 50 mg tablet Comments:   Reason for Stopping:               Signed by: Cozette Castleman, MD  8/3/2018

## 2018-08-03 NOTE — PROGRESS NOTES
Verbal shift change report given to Nicko Conner from [address] [place] [address] (oncoming nurse) by Jostin Paul (offgoing nurse). Report included the following information SBAR, Kardex, Intake/Output and MAR.

## 2018-08-03 NOTE — NURSE NAVIGATOR
[address]  AR Bundled Payment Handoff FROM:                                TO: [facility] ([address] [place] [address] or Facility name) [address] 
[address] [address] Dept: [phone] Loc: [phone] Room#:  576/01 Nurse Navigator:  Krunal Crespo RN 
 
  
 
  SITUATION  
  
ASAScore: ASA 2 - Patient with mild systemic disease with no functional limitations Admitted:  7/30/2018  Hospital Day: 5      Attending Provider:  Rannie Goodpasture, MD    
Consultations:  None PCP:  Marisabel Little MD   [phone] Admitting Dx:  OSTEOARTHRITIS RIGHT KNEE Osteoarthritis of right knee Principal Problem: 
  Osteoarthritis of right knee (7/30/2018) 4 Days Post-Op of  
Procedure(s): RIGHT TOTAL KNEE REPLACEMENT  
BY: Rannie Goodpasture, MD             ON: 7/30/2018 Code Status: Full Code             Advance Directive? Yes Not W Pt (Send w/patient) Isolation:  There are currently no Active Isolations       MDRO: No current active infections BACKGROUND Allergies: Allergies Allergen Reactions  Amoxicillin-Pot Clavulanate Nausea and Vomiting  Cephalexin Nausea and Vomiting  Doxycycline Nausea and Vomiting  Neomycin-Polymyxin-Hc Nausea and Vomiting  Rocephin [Ceftriaxone] Nausea and Vomiting  Sulfamethoxazole-Trimethoprim Nausea and Vomiting Past Medical History:  
Diagnosis Date  Breast cancer ([facility] [address] 1997  
 lumpectomy, RADIATION  
 Cancer [facility]) 2002  
 dcis left breast 2002  Cardiomyopathy ([facility] [address]  Chronic pain  Coronary artery disease involving native coronary artery of native heart without angina pectoris 12/16/2016  
 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  
Procedure Laterality Date  CARDIAC SURG PROCEDURE UNLIST  2015 STENT PLACEMENTS X2  
 HX APPENDECTOMY    
 YRS AGO PT DON`T REMEMBER   
 HX BREAST BIOPSY Left 1980`S  HX BREAST LUMPECTOMY  1988  
 dcis lelf breast  
 HX CATARACT REMOVAL Bilateral 1980`S  HX COLONOSCOPY    
 HX CORONARY STENT PLACEMENT  2015 X6  
 HX GI    
 COLONOSCOPY  
 HX HYSTERECTOMY  1960  HX OTHER SURGICAL    
 ORAL  
 HX OTHER SURGICAL  07/2018 SQUAMOUS CELL CA REMOVED FROM RIGHT FOREARM   
 TOTAL HIP ARTHROPLASTY Left 2018  VASCULAR SURGERY PROCEDURE UNLIST    
 varicose veins Prior to Admission Medications Prescriptions Last Dose Informant Patient Reported? Taking?  
allopurinol (ZYLOPRIM) 300 mg tablet 7/29/2018 at Unknown time  No Yes Sig: Take 1/2 tablet by mouth daily. For tophaceous gout  
aspirin delayed-release 81 mg tablet 7/30/2018 at am  Yes Yes Sig: Take 81 mg by mouth daily. atorvastatin (LIPITOR) 20 mg tablet 7/30/2018 at Unknown time  Yes Yes Sig: Take 20 mg by mouth nightly. bumetanide (BUMEX) 2 mg tablet 7/30/2018 at Unknown time  Yes Yes Sig: Take 2 mg by mouth daily. cholecalciferol (VITAMIN D3) 1,000 unit cap 7/23/2018 at Unknown time  Yes Yes Sig: Take 1,000 Units by mouth daily. clopidogrel (PLAVIX) 75 mg tab 7/25/2018 at am  Yes No  
Sig: Take 75 mg by mouth daily. gabapentin (NEURONTIN) 300 mg capsule 7/30/2018 at Unknown time  No Yes Sig: TAKE 1 CAPSULE BY MOUTH THREE TIMES A DAY  
lisinopril (PRINIVIL, ZESTRIL) 10 mg tablet 7/30/2018 at Unknown time  Yes Yes Sig: Take 10 mg by mouth.  
metoprolol succinate (TOPROL-XL) 50 mg XL tablet 7/29/2018 at 1900  Yes Yes Sig: TAKE 1&1/2 TABLETS BY MOUTH EVERY HS  
multivitamin (ONE A DAY) tablet 7/23/2018 at Unknown time  Yes Yes Sig: Take 1 Tab by mouth daily. nitroglycerin (NITROSTAT) 0.4 mg SL tablet Not Taking at Unknown time  Yes No  
Sig: every five (5) minutes as needed. traMADol (ULTRAM) 50 mg tablet 7/28/2018  No No  
Sig: Take 2 Tabs by mouth every eight (8) hours as needed for Pain. Max Daily Amount: 300 mg.  
triamcinolone acetonide (KENALOG) 0.025 % topical cream Unknown at Unknown time  Yes No  
Sig: Apply  to affected area. Facility-Administered Medications: None Vaccinations:   
Immunization History Administered Date(s) Administered  Influenza High Dose Vaccine PF 10/15/2015  Influenza Vaccine 09/11/2017  Pneumococcal Conjugate (PCV-13) 12/16/2016  Pneumococcal Vaccine (Unspecified Type) 06/06/2000  Zoster Vaccine, Live 03/06/2013 ASSESSMENT Age: 80 y. o. Gender: female Height: Height: 5' (152.4 cm)                    Weight:Weight: 68 kg (149 lb 14.6 oz) Patient Vitals for the past 8 hrs: 
 Temp Pulse Resp BP SpO2  
08/03/18 0813 98.5 °F (36.9 °C) 66 14 109/68 97 % Active Orders Diet DIET REGULAR Orientation: Orientation Level: Oriented X4 Active Lines/Drains:  (Peg Tube / Cartwright / CL or S/L?):no 
 
Urinary Status: Voiding, Incontinent briefs      Last BM: Last Bowel Movement Date: 07/31/18 Skin Integrity: Incision (comment) Wound Knee Right-DRESSING STATUS: Clean, dry, and intact Wound Knee Right-DRESSING TYPE: Aquacel Mobility: Slightly limited Weight Bearing Status: WBAT (Weight Bearing as Tolerated) Gait Training Assistive Device: Gait belt, Walker, rolling Ambulation - Level of Assistance: Moderate assistance, Adaptive equipment, Assist x2 Distance (ft): 3 Feet (ft) On Anticoagulation? YES  Aspirin  And Plavix Pain Medications given:  Oxycodone Lab Results Component Value Date/Time  Glucose 169 (H) 07/31/2018 02:46 AM  
 Hemoglobin A1c 6.0 07/19/2018 02:23 PM  
 INR 1.0 07/19/2018 02:23 PM  
 INR 1.0 04/05/2018 01:01 PM  
 HGB 9.7 (L) 08/02/2018 03:17 AM  
 HGB 7.9 (L) 08/01/2018 06:04 AM  
 HGB 9.1 (L) 07/31/2018 02:46 AM  
 HGB 11.3 (L) 07/19/2018 02:23 PM  
 
 
Readmission Risks: 
Score:      
  RECOMMENDATION See After Visit Summary (AVS) for: · Discharge instructions · After [facility] · Medication Reconciliation [facility] Nurse Navigator JOHN Celestin, RN-BC Office  [phone] Cell      [phone] Fax      [phone] 
[email or web address]

## 2018-08-03 NOTE — DISCHARGE INSTRUCTIONS
Discharge Instructions Knee Replacement  Dr. Zachary Hurd    Patient Name: Tico Aiken  Date of procedure: 7/30/2018   Procedure: Procedure(s):  RIGHT TOTAL KNEE REPLACEMENT  Surgeon: Charlette Candelario) and Role:     * Keeley Mulligan MD - Primary  PCP: Bird Bruce MD  Date of discharge: No discharge date for patient encounter. Follow up appointments   Follow up with Dr. Zachary Hurd in 3 weeks. Call [phone] to make an appointment.  If home health has been arranged for you the agency will contact you to arrange dates/times for visits. Please call them if you do not hear from them within 24 hours after you are discharged    When to call your Orthopaedic Surgeon: Call [phone]. If you call after 5pm or on a weekend, the on call physician will be contacted   Unrelieved pain   Signs of infection-if your incision is red; continues to have drainage; drainage has a foul odor or if you have a persistent fever over 101 degrees   Signs of a blood clot in your leg-calf pain, tenderness, redness, swelling of lower leg    When to call your Primary Care Physician:   Concerns about medical conditions such as diabetes, high blood pressure, asthma, congestive heart failure   Call if blood sugars are elevated, persistent headache or dizziness, coughing or congestion, constipation or diarrhea, burning with urination, abnormal heart rate    When to call [id number] go to the nearest emergency room   Acute onset of chest pain, shortness of breath, difficulty breathing    Activity   Weight bearing as tolerated with walker or crutches.  Refer to pages 23-31 of your handbook for instructions and pictures   Complete your Home Exercise Program daily as instructed by your therapist.  Refer to pages 33-41 of your handbook for instructions and pictures   Get up every one hour and walk (except at night when sleeping)   Do not drive or operate heavy machinery    Incision Care   The Aquacel (brown, waterproof) surgical dressing is to remain on your knee for 7 days. On the 7th day have someone gently peel the dressing off by carefully lifting the edge and stretching it slightly to break the adhesive seal and leave incision open to air. You may take a shower with the Aquacel dressing in place.  You do have staples in your knee incision; if present they will be removed by the [facility] staff in 10-14 days and steri-strips will be applied. Leave the steri-strips on until they fall off      Preventing blood clots    Take Aspirin and Plavix as prescribed. Pain management   Take pain medication as prescribed; decrease the amount you use as your pain lessens   Avoid alcoholic beverages while taking pain medication   Do not take any over-the-counter medication for pain except Tylenol (acetaminophen)   Please be aware that many medications contain Tylenol. We do not want you to over medicate so please read the information below as a guide. Do not take more than 3 Grams of Tylenol in a 24 hour period. (There are 1000 milligrams in one Gram)  o 325 mg of Tylenol per tablet (do not take more than 9 tablets in 24 hours)  o 500 mg of Tylenol per tablet (do not take more than 6 tablets in 24 hours)  o .   You may place an ice bag on your knee for 15-20 minutes after exercising and as needed throughout the day and night    Diet   Resume usual diet; drink plenty of fluids; eat foods high in fiber   You may want to take a stool softener (such as Senokot-S or Colace) to prevent constipation while you are taking pain medication.   If constipation occurs, take a laxative (such as Dulcolax tablets, Milk of Magnesia, or a suppository)    Patient meets criteria for   BUNDLED PAYMENT   for Care Improvement Initiative Criteria    Contact Information for Orthopedic Nurse Navigator:      JOHN Membreno, RN-BC  [phone]

## 2018-08-03 NOTE — PROGRESS NOTES
Problem: Falls - Risk of 
Goal: *Absence of Falls Document Liliana Johnson Fall Risk and appropriate interventions in the flowsheet. Outcome: Progressing Towards Goal 
Fall Risk Interventions: 
Mobility Interventions: Patient to call before getting OOB Medication Interventions: Assess postural VS orthostatic hypotension, Patient to call before getting OOB, Utilize gait belt for transfers/ambulation Elimination Interventions: Patient to call for help with toileting needs History of Falls Interventions: Door open when patient unattended

## 2018-08-03 NOTE — PROGRESS NOTES
Discharge order and summary noted. The patient is ready today. 1pm [facility] to [facility] [employer].  DONNY

## 2018-09-21 ENCOUNTER — APPOINTMENT (OUTPATIENT)
Dept: GENERAL RADIOLOGY | Age: 83
DRG: 066 | End: 2018-09-21
Attending: EMERGENCY MEDICINE
Payer: MEDICARE

## 2018-09-21 ENCOUNTER — APPOINTMENT (OUTPATIENT)
Dept: CT IMAGING | Age: 83
DRG: 066 | End: 2018-09-21
Attending: EMERGENCY MEDICINE
Payer: MEDICARE

## 2018-09-21 ENCOUNTER — HOSPITAL ENCOUNTER (INPATIENT)
Age: 83
LOS: 4 days | Discharge: NURSING, REHAB OR LONG-TERM CARE | DRG: 066 | End: 2018-09-25
Attending: EMERGENCY MEDICINE | Admitting: FAMILY MEDICINE
Payer: MEDICARE

## 2018-09-21 DIAGNOSIS — R41.82 ALTERED MENTAL STATUS, UNSPECIFIED ALTERED MENTAL STATUS TYPE: ICD-10-CM

## 2018-09-21 DIAGNOSIS — G93.41 METABOLIC ENCEPHALOPATHY: ICD-10-CM

## 2018-09-21 DIAGNOSIS — I63.40 CEREBROVASCULAR ACCIDENT (CVA) DUE TO EMBOLISM OF CEREBRAL ARTERY (HCC): ICD-10-CM

## 2018-09-21 LAB
ALBUMIN SERPL-MCNC: 3.8 G/DL (ref 3.5–5)
ALBUMIN/GLOB SERPL: 0.9 {RATIO} (ref 1.1–2.2)
ALP SERPL-CCNC: 114 U/L (ref 45–117)
ALT SERPL-CCNC: 36 U/L (ref 12–78)
ANION GAP SERPL CALC-SCNC: 10 MMOL/L (ref 5–15)
APPEARANCE UR: CLEAR
APTT PPP: 24.9 SEC (ref 22.1–32)
AST SERPL-CCNC: 30 U/L (ref 15–37)
BACTERIA URNS QL MICRO: NEGATIVE /HPF
BASOPHILS # BLD: 0 K/UL (ref 0–0.1)
BASOPHILS NFR BLD: 0 % (ref 0–1)
BILIRUB SERPL-MCNC: 0.4 MG/DL (ref 0.2–1)
BILIRUB UR QL: NEGATIVE
BUN SERPL-MCNC: 21 MG/DL (ref 6–20)
BUN/CREAT SERPL: 15 (ref 12–20)
CALCIUM SERPL-MCNC: 9.3 MG/DL (ref 8.5–10.1)
CHLORIDE SERPL-SCNC: 107 MMOL/L (ref 97–108)
CO2 SERPL-SCNC: 28 MMOL/L (ref 21–32)
COLOR UR: ABNORMAL
COMMENT, HOLDF: NORMAL
CREAT SERPL-MCNC: 1.37 MG/DL (ref 0.55–1.02)
DIFFERENTIAL METHOD BLD: ABNORMAL
EOSINOPHIL # BLD: 0 K/UL (ref 0–0.4)
EOSINOPHIL NFR BLD: 1 % (ref 0–7)
EPITH CASTS URNS QL MICRO: ABNORMAL /LPF
ERYTHROCYTE [DISTWIDTH] IN BLOOD BY AUTOMATED COUNT: 15.5 % (ref 11.5–14.5)
GLOBULIN SER CALC-MCNC: 4.1 G/DL (ref 2–4)
GLUCOSE SERPL-MCNC: 132 MG/DL (ref 65–100)
GLUCOSE UR STRIP.AUTO-MCNC: NEGATIVE MG/DL
HCT VFR BLD AUTO: 43.3 % (ref 35–47)
HGB BLD-MCNC: 13.7 G/DL (ref 11.5–16)
HGB UR QL STRIP: NEGATIVE
HYALINE CASTS URNS QL MICRO: ABNORMAL /LPF (ref 0–5)
IMM GRANULOCYTES # BLD: 0 K/UL (ref 0–0.04)
IMM GRANULOCYTES NFR BLD AUTO: 0 % (ref 0–0.5)
INR PPP: 1 (ref 0.9–1.1)
KETONES UR QL STRIP.AUTO: NEGATIVE MG/DL
LEUKOCYTE ESTERASE UR QL STRIP.AUTO: ABNORMAL
LYMPHOCYTES # BLD: 2.6 K/UL (ref 0.8–3.5)
LYMPHOCYTES NFR BLD: 29 % (ref 12–49)
MCH RBC QN AUTO: 31.8 PG (ref 26–34)
MCHC RBC AUTO-ENTMCNC: 31.6 G/DL (ref 30–36.5)
MCV RBC AUTO: 100.5 FL (ref 80–99)
MONOCYTES # BLD: 0.6 K/UL (ref 0–1)
MONOCYTES NFR BLD: 7 % (ref 5–13)
NEUTS SEG # BLD: 5.5 K/UL (ref 1.8–8)
NEUTS SEG NFR BLD: 63 % (ref 32–75)
NITRITE UR QL STRIP.AUTO: NEGATIVE
NRBC # BLD: 0 K/UL (ref 0–0.01)
NRBC BLD-RTO: 0 PER 100 WBC
PH UR STRIP: 5 [PH] (ref 5–8)
PLATELET # BLD AUTO: 326 K/UL (ref 150–400)
PMV BLD AUTO: 9.8 FL (ref 8.9–12.9)
POTASSIUM SERPL-SCNC: 3.6 MMOL/L (ref 3.5–5.1)
PROT SERPL-MCNC: 7.9 G/DL (ref 6.4–8.2)
PROT UR STRIP-MCNC: NEGATIVE MG/DL
PROTHROMBIN TIME: 10 SEC (ref 9–11.1)
RBC # BLD AUTO: 4.31 M/UL (ref 3.8–5.2)
RBC #/AREA URNS HPF: ABNORMAL /HPF (ref 0–5)
SAMPLES BEING HELD,HOLD: NORMAL
SODIUM SERPL-SCNC: 145 MMOL/L (ref 136–145)
SP GR UR REFRACTOMETRY: 1.02 (ref 1–1.03)
THERAPEUTIC RANGE,PTTT: NORMAL SECS (ref 58–77)
TROPONIN I SERPL-MCNC: <0.05 NG/ML
TSH SERPL DL<=0.05 MIU/L-ACNC: 0.74 UIU/ML (ref 0.36–3.74)
UR CULT HOLD, URHOLD: NORMAL
UROBILINOGEN UR QL STRIP.AUTO: 0.2 EU/DL (ref 0.2–1)
WBC # BLD AUTO: 8.7 K/UL (ref 3.6–11)
WBC URNS QL MICRO: ABNORMAL /HPF (ref 0–4)

## 2018-09-21 PROCEDURE — 74011250636 HC RX REV CODE- 250/636: Performed by: FAMILY MEDICINE

## 2018-09-21 PROCEDURE — 80053 COMPREHEN METABOLIC PANEL: CPT | Performed by: EMERGENCY MEDICINE

## 2018-09-21 PROCEDURE — 85730 THROMBOPLASTIN TIME PARTIAL: CPT | Performed by: EMERGENCY MEDICINE

## 2018-09-21 PROCEDURE — 85610 PROTHROMBIN TIME: CPT | Performed by: EMERGENCY MEDICINE

## 2018-09-21 PROCEDURE — 65660000000 HC RM CCU STEPDOWN

## 2018-09-21 PROCEDURE — 85025 COMPLETE CBC W/AUTO DIFF WBC: CPT | Performed by: EMERGENCY MEDICINE

## 2018-09-21 PROCEDURE — 84484 ASSAY OF TROPONIN QUANT: CPT | Performed by: EMERGENCY MEDICINE

## 2018-09-21 PROCEDURE — 99285 EMERGENCY DEPT VISIT HI MDM: CPT

## 2018-09-21 PROCEDURE — 71045 X-RAY EXAM CHEST 1 VIEW: CPT

## 2018-09-21 PROCEDURE — 84443 ASSAY THYROID STIM HORMONE: CPT | Performed by: FAMILY MEDICINE

## 2018-09-21 PROCEDURE — 81001 URINALYSIS AUTO W/SCOPE: CPT | Performed by: EMERGENCY MEDICINE

## 2018-09-21 PROCEDURE — 93005 ELECTROCARDIOGRAM TRACING: CPT

## 2018-09-21 PROCEDURE — 36415 COLL VENOUS BLD VENIPUNCTURE: CPT | Performed by: EMERGENCY MEDICINE

## 2018-09-21 PROCEDURE — 70450 CT HEAD/BRAIN W/O DYE: CPT

## 2018-09-21 RX ORDER — ACETAMINOPHEN 500 MG
650 TABLET ORAL
COMMUNITY
End: 2020-12-28

## 2018-09-21 RX ORDER — SODIUM CHLORIDE 0.9 % (FLUSH) 0.9 %
5-10 SYRINGE (ML) INJECTION EVERY 8 HOURS
Status: DISCONTINUED | OUTPATIENT
Start: 2018-09-21 | End: 2018-09-25 | Stop reason: HOSPADM

## 2018-09-21 RX ORDER — SODIUM CHLORIDE 9 MG/ML
100 INJECTION, SOLUTION INTRAVENOUS CONTINUOUS
Status: DISCONTINUED | OUTPATIENT
Start: 2018-09-21 | End: 2018-09-22

## 2018-09-21 RX ORDER — SODIUM CHLORIDE 0.9 % (FLUSH) 0.9 %
5-10 SYRINGE (ML) INJECTION AS NEEDED
Status: DISCONTINUED | OUTPATIENT
Start: 2018-09-21 | End: 2018-09-25 | Stop reason: HOSPADM

## 2018-09-21 RX ORDER — CETIRIZINE HCL 10 MG
10 TABLET ORAL DAILY
COMMUNITY

## 2018-09-21 RX ORDER — TRAMADOL HYDROCHLORIDE 50 MG/1
50 TABLET ORAL 2 TIMES DAILY
COMMUNITY
End: 2019-06-21

## 2018-09-21 RX ADMIN — SODIUM CHLORIDE 100 ML/HR: 900 INJECTION, SOLUTION INTRAVENOUS at 21:59

## 2018-09-21 RX ADMIN — Medication 5 ML: at 22:00

## 2018-09-21 NOTE — ED PROVIDER NOTES
HPI Comments: 80 y.o. female with past medical history significant for tophaceous gout, HTN, dyslipidemia, cardiomyopathy, CVA, breast cancer, hypercholesterolemia, and CAD who presents from home with chief complaint of confusion. Patient previously had a left total hip replacement on 4/17/18 and right total knee replacement on 7/30/18. Patient was noted to have done well at [facility] without any confusion and was sent home 4 days ago. Since coming home, the patient has been fatigued and has had \"trouble with her vision,\" memory loss, and confusion. Yesterday, relatives report that the patent was sleeping later than usual and appeared confused and \"not herself. \"  They also mention that her lip was twitching intermittently throughout the day. The patient visited an eye doctor yesterday for her vision changes, who suggested this may be \"due to anesthesia. \"  Today, relatives report that the patient was found on the couch with her phone, still on a call, lying on the floor. They add that the patient seemed to have hallucinations today as she called several family members to report that her \"room and basement were flooded\" and that she was \"walking in water. \"  Per relatives, patient had hypotension and appeared pale earlier this afternoon. Of note, patient reportedly had a stroke 11 months ago and has had some slight trouble speaking and \"finding words\" since the incident. Patient currently lives alone and is aware of her location, the month, and the current year. Patient denies fever, chills, nausea, vomiting, diarrhea, and headache. There are no other acute medical concerns at this time. Social hx: PCP: Jericho Mathis MD 
 
Note written by Philly Downs, as dictated by Teofilo Galindo MD 4:59 PM 
 
 
 
The history is provided by the patient and a relative. No  was used. Past Medical History:  
Diagnosis Date  Breast cancer ([facility] [address] 1997 lumpectomy, RADIATION  
 Cancer [facility]) 2002  
 dcis left breast 2002  Cardiomyopathy ([facility] [address]  Chronic pain  Coronary artery disease involving native coronary artery of native heart without angina pectoris 12/16/2016  
 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  
Procedure Laterality Date  CARDIAC SURG PROCEDURE UNLIST  2015 STENT PLACEMENTS X2  
 HX APPENDECTOMY    
 YRS AGO PT DON`T REMEMBER   
 HX BREAST BIOPSY Left 1980`S  HX BREAST LUMPECTOMY  1988  
 dcis lelf breast  
 HX CATARACT REMOVAL Bilateral 1980`S  HX COLONOSCOPY    
 HX CORONARY STENT PLACEMENT  2015 X6  
 HX GI    
 COLONOSCOPY  
 HX HYSTERECTOMY  1960  HX OTHER SURGICAL    
 ORAL  
 HX OTHER SURGICAL  07/2018 SQUAMOUS CELL CA REMOVED FROM RIGHT FOREARM   
 TOTAL HIP ARTHROPLASTY Left 2018  VASCULAR SURGERY PROCEDURE UNLIST    
 varicose veins Family History:  
Problem Relation Age of Onset  Cancer Sister   
  breast cancer  Breast Cancer Sister 61  Cancer Mother PANCREAS   
 Diabetes Father  Other Father PVD- AMPUTATION BL LE  
 Heart Disease Brother  Other Brother ALS  
 Heart Failure Son   
 Heart Disease Son   
[address] [facility] Hypertension Son  Anesth Problems Neg Hx Social History Social History  Marital status:  Spouse name: N/A  
 Number of children: N/A  
 Years of education: N/A Occupational History  Not on file. Social History Main Topics  Smoking status: Never Smoker  Smokeless tobacco: Never Used  Alcohol use No  
 Drug use: No  
 Sexual activity: Not on file Other Topics Concern  Not on file Social History Narrative ALLERGIES: Amoxicillin-pot clavulanate; Cephalexin; Doxycycline; Neomycin-polymyxin-hc; Rocephin [ceftriaxone]; and Sulfamethoxazole-trimethoprim Review of Systems Constitutional: Positive for activity change and fatigue. Negative for chills and fever. Eyes: Positive for visual disturbance. Respiratory: Negative for chest tightness and shortness of breath. Cardiovascular: Negative for chest pain. Gastrointestinal: Negative for abdominal pain, blood in stool, diarrhea, nausea and vomiting. Genitourinary: Negative for urgency. Neurological: Negative for dizziness. Psychiatric/Behavioral: Positive for hallucinations. All other systems reviewed and are negative. Vitals:  
 09/21/18 1516 09/21/18 1659 BP: 121/79 Pulse: 94 Resp: 18 Temp: 98.1 °F (36.7 °C) SpO2: 92% 99% Weight: 68 kg (150 lb) Height: 5' (1.524 m) Physical Exam  
Constitutional: She is oriented to person, place, and time. She appears well-developed and well-nourished. No distress. HENT:  
Head: Normocephalic and atraumatic. Eyes: Conjunctivae are normal. No scleral icterus. Neck: Neck supple. No tracheal deviation present. Cardiovascular: Normal rate, regular rhythm, normal heart sounds and intact distal pulses. Exam reveals no gallop and no friction rub. No murmur heard. Pulmonary/Chest: Effort normal and breath sounds normal. She has no wheezes. She has no rales. Abdominal: Soft. She exhibits no distension. There is no tenderness. There is no rebound and no guarding. Musculoskeletal: She exhibits no edema. Neurological: She is alert and oriented to person, place, and time. Skin: Skin is warm and dry. No rash noted. Psychiatric: She has a normal mood and affect. Nursing note and vitals reviewed. Note written by Philly Dailey, as dictated by Gerber Peñaloza MD 4:59 PM 
 
 
[facility] 
 
 
ED Course Procedures ED EKG interpretation: Rhythm: normal sinus rhythm; Rate (approx.): 89 bpm; Inferior Q Waves; Anteroseptal Q Waves. Note written by Amna Lyle, as dictated by Angela Clay MD 4:59 PM 
 
Consult note: Dr. Marco Ramos - will arrange admission. Angela Clay MD 
 
A/P: confusion and episode today where she could not be aroused for 15 min; unclear etiology; labs, UA, CT head ok but I feel like admission indicated considering abrupt change in mental status.   Angela Clay MD

## 2018-09-21 NOTE — IP AVS SNAPSHOT
[address] 
[phone] Patient: Jailene Maxwell MRN: [id number] IFRAH:[id number] A check ahmet indicates which time of day the medication should be taken. My Medications START taking these medications Instructions Each Dose to Equal  
 Morning Noon Evening Bedtime  
 apixaban 5 mg tablet Commonly known as:  Russell Guzman Your last dose was: Your next dose is: Take 1 Tab by mouth two (2) times a day. 5 mg CONTINUE taking these medications Instructions Each Dose to Equal  
 Morning Noon Evening Bedtime  
 acetaminophen 500 mg tablet Commonly known as:  TYLENOL Your last dose was: Your next dose is: Take 500 mg by mouth as needed for Pain. 500 mg  
    
   
   
   
  
 allopurinol 300 mg tablet Commonly known as:  Evette Minor Your last dose was: Your next dose is: Take 1/2 tablet by mouth daily. For tophaceous gout  
     
   
   
   
  
 aspirin delayed-release 81 mg tablet Your last dose was: Your next dose is: Take 81 mg by mouth daily. 81 mg  
    
   
   
   
  
 atorvastatin 20 mg tablet Commonly known as:  LIPITOR Your last dose was: Your next dose is: Take 20 mg by mouth nightly. 20 mg  
    
   
   
   
  
 cetirizine 10 mg tablet Commonly known as:  ZYRTEC Your last dose was: Your next dose is: Take 10 mg by mouth daily. 10 mg  
    
   
   
   
  
 gabapentin 300 mg capsule Commonly known as:  NEURONTIN Your last dose was: Your next dose is: TAKE 1 CAPSULE BY MOUTH THREE TIMES A DAY  
     
   
   
   
  
 lisinopril 10 mg tablet Commonly known as:  Joan Love Your last dose was: Your next dose is: Take 10 mg by mouth daily.   
 10 mg  
    
 metoprolol succinate 50 mg XL tablet Commonly known as:  TOPROL-XL Your last dose was: Your next dose is: TAKE 1&1/2 TABLETS BY MOUTH EVERY HS  
     
   
   
   
  
 multivitamin tablet Commonly known as:  ONE A DAY Your last dose was: Your next dose is: Take 1 Tab by mouth daily. 1 Tab  
    
   
   
   
  
 nitroglycerin 0.4 mg SL tablet Commonly known as:  NITROSTAT Your last dose was: Your next dose is:    
   
   
 every five (5) minutes as needed. traMADol 50 mg tablet Commonly known as:  ULTRAM  
   
Your last dose was: Your next dose is: Take 50 mg by mouth two (2) times a day. 50 mg  
    
   
   
   
  
 VITAMIN D3 PO Your last dose was: Your next dose is: Take 1 Tab by mouth daily. Unsure of dose 1 Tab STOP taking these medications   
 bumetanide 2 mg tablet Commonly known as:  Arletta Franko PLAVIX 75 mg Tab Generic drug:  clopidogrel  
   
  
 triamcinolone acetonide 0.025 % topical cream  
Commonly known as:  KENALOG Where to Get Your Medications Information on where to get these meds will be given to you by the nurse or doctor. ! Ask your nurse or doctor about these medications  
  apixaban 5 mg tablet

## 2018-09-21 NOTE — ED NOTES
Bedside report received from Maddie Saleh RN. Report was received using ED summary and verbal, whiteboards updated.

## 2018-09-21 NOTE — ED TRIAGE NOTES
Patient reports right knee replacement July 30th and discharged from the [facility] Monday September 17th after rehab. Last 2 days she has been very confused. Lethargic and sleeping a lot. Decreased appetite. Granddaughter said it took her over 15 minutes to wake her up today.

## 2018-09-21 NOTE — IP AVS SNAPSHOT
[address] [facility] Lionel Benton 13 
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 
[phone] [address] [address] 
[phone] Discharge Orders None A check ahmet indicates which time of day the medication should be taken. My Medications START taking these medications Instructions Each Dose to Equal  
 Morning Noon Evening Bedtime  
 apixaban 5 mg tablet Commonly known as:  Lyell Mola Your last dose was: Your next dose is: Take 1 Tab by mouth two (2) times a day. 5 mg CONTINUE taking these medications Instructions Each Dose to Equal  
 Morning Noon Evening Bedtime  
 acetaminophen 500 mg tablet Commonly known as:  TYLENOL Your last dose was: Your next dose is: Take 500 mg by mouth as needed for Pain. 500 mg  
    
   
   
   
  
 allopurinol 300 mg tablet Commonly known as:  Nesha Hale Your last dose was: Your next dose is: Take 1/2 tablet by mouth daily. For tophaceous gout  
     
   
   
   
  
 aspirin delayed-release 81 mg tablet Your last dose was: Your next dose is: Take 81 mg by mouth daily. 81 mg  
    
   
   
   
  
 atorvastatin 20 mg tablet Commonly known as:  LIPITOR Your last dose was: Your next dose is: Take 20 mg by mouth nightly. 20 mg  
    
   
   
   
  
 cetirizine 10 mg tablet Commonly known as:  ZYRTEC Your last dose was: Your next dose is: Take 10 mg by mouth daily. 10 mg  
    
   
   
   
  
 gabapentin 300 mg capsule Commonly known as:  NEURONTIN Your last dose was: Your next dose is: TAKE 1 CAPSULE BY MOUTH THREE TIMES A DAY  
     
   
   
   
  
 lisinopril 10 mg tablet Commonly known as:  Raquel Needy Your last dose was: Your next dose is: Take 10 mg by mouth daily. 10 mg  
    
   
   
   
  
 metoprolol succinate 50 mg XL tablet Commonly known as:  TOPROL-XL Your last dose was: Your next dose is: TAKE 1&1/2 TABLETS BY MOUTH EVERY HS  
     
   
   
   
  
 multivitamin tablet Commonly known as:  ONE A DAY Your last dose was: Your next dose is: Take 1 Tab by mouth daily. 1 Tab  
    
   
   
   
  
 nitroglycerin 0.4 mg SL tablet Commonly known as:  NITROSTAT Your last dose was: Your next dose is:    
   
   
 every five (5) minutes as needed. traMADol 50 mg tablet Commonly known as:  ULTRAM  
   
Your last dose was: Your next dose is: Take 50 mg by mouth two (2) times a day. 50 mg  
    
   
   
   
  
 VITAMIN D3 PO Your last dose was: Your next dose is: Take 1 Tab by mouth daily. Unsure of dose 1 Tab STOP taking these medications   
 bumetanide 2 mg tablet Commonly known as:  Malachy Bile PLAVIX 75 mg Tab Generic drug:  clopidogrel  
   
  
 triamcinolone acetonide 0.025 % topical cream  
 Commonly known as:  KENALOG Where to Get Your Medications Information on where to get these meds will be given to you by the nurse or doctor. ! Ask your nurse or doctor about these medications  
  apixaban 5 mg tablet Opioid Education Prescription Opioids: What You Need to Know: 
 
Prescription opioids can be used to help relieve moderate-to-severe pain and are often prescribed following a surgery or injury, or for certain health conditions. These medications can be an important part of treatment but also come with serious risks. Opioids are strong pain medicines. Examples include hydrocodone, oxycodone, fentanyl, and morphine. Heroin is an example of an illegal opioid. It is important to work with your health care provider to make sure you are getting the safest, most effective care. WHAT ARE THE RISKS AND SIDE EFFECTS OF OPIOID USE? Prescription opioids carry serious risks of addiction and overdose, especially with prolonged use. An opioid overdose, often marked by slow breathing, can cause sudden death. The use of prescription opioids can have a number of side effects as well, even when taken as directed. · Tolerance-meaning you might need to take more of a medication for the same pain relief · Physical dependence-meaning you have symptoms of withdrawal when the medication is stopped. Withdrawal symptoms can include nausea, sweating, chills, diarrhea, stomach cramps, and muscle aches. Withdrawal can last up to several weeks, depending on which drug you took and how long you took it. · Increased sensitivity to pain · Constipation · Nausea, vomiting, and dry mouth · Sleepiness and dizziness · Confusion · Depression · Low levels of testosterone that can result in lower sex drive, energy, and strength · Itching and sweating RISKS ARE GREATER WITH:      
· History of drug misuse, substance use disorder, or overdose · Mental health conditions (such as depression or anxiety) · Sleep apnea · Older age (72 years or older) · Pregnancy Avoid alcohol while taking prescription opioids. Also, unless specifically advised by your health care provider, medications to avoid include: · Benzodiazepines (such as Xanax or Valium) · Muscle relaxants (such as Soma or Flexeril) · Hypnotics (such as Ambien or Lunesta) · Other prescription opioids KNOW YOUR OPTIONS Talk to your health care provider about ways to manage your pain that don't involve prescription opioids. Some of these options may actually work better and have fewer risks and side effects. Consult your physician before adding or stopping any medications, treatments, or physical activity. Options may include: 
· Pain relievers such as acetaminophen, ibuprofen, and naproxen · Some medications that are also used for depression or seizures · Physical therapy and exercise · Counseling to help patients learn how to cope better with triggers of pain and stress. · Application of heat or cold compress · Massage therapy · Relaxation techniques Be Informed Make sure you know the name of your medication, how much and how often to take it, and its potential risks & side effects. IF YOU ARE PRESCRIBED OPIOIDS FOR PAIN: 
· Never take opioids in greater amounts or more often than prescribed. Remember the goal is not to be pain-free but to manage your pain at a tolerable level. · Follow up with your primary care provider to: · Work together to create a plan on how to manage your pain. · Talk about ways to help manage your pain that don't involve prescription opioids. · Talk about any and all concerns and side effects. · Help prevent misuse and abuse. · Never sell or share prescription opioids · Help prevent misuse and abuse.  
· Store prescription opioids in a secure place and out of reach of others (this may include visitors, children, friends, and family). · Safely dispose of unused/unwanted prescription opioids: Find your community drug take-back program or your pharmacy mail-back program, or flush them down the toilet, following guidance from the Food and Drug Administration (www.fda.gov/Drugs/ResourcesForYou). · Visit www.cdc.gov/drugoverdose to learn about the risks of opioid abuse and overdose. · If you believe you may be struggling with addiction, tell your health care provider and ask for guidance or call [employer] at 1-703-605-TQUE. Discharge Instructions Patient Discharge Instructions Marzena Padilla / [id number] : 1933 Admitted 2018 Discharged: 2018 · It is important that you take the medication exactly as they are prescribed. · Keep your medication in the bottles provided by the pharmacist and keep a list of the medication names, dosages, and times to be taken in your wallet. Recommended diet:NO ADDED SALT Recommended activity: SKILLED PT/OT Follow-up with Geremias Muir Information obtained by : 
I understand that if any problems occur once I am at home I am to contact my physician. I understand and acknowledge receipt of the instructions indicated above. Physician's or R.N.'s Signature                                                                  Date/Time Patient or Representative Signature                                                          Date/Time Bin Announcement We are excited to announce that we are making your provider's discharge notes available to you in LogicNets. You will see these notes when they are completed and signed by the physician that discharged you from your recent hospital stay. If you have any questions or concerns about any information you see in LogicNets, please call the Health Information Department where you were seen or reach out to your Primary Care Provider for more information about your plan of care. Introducing [facility]! [employer] introduces LogicNets patient portal. Now you can access parts of your medical record, email your doctor's office, and request medication refills online. 1. In your internet browser, go to [email or web address] 2. Click on the First Time User? Click Here link in the Sign In box. You will see the New Member Sign Up page. 3. Enter your LogicNets Access Code exactly as it appears below. You will not need to use this code after youve completed the sign-up process. If you do not sign up before the expiration date, you must request a new code. · LogicNets Access Code: [email or web address] Expires: 10/17/2018  1:37 PM 
 
4. Enter the last four digits of your Social Security Number (xxxx) and Date of Birth (mm/dd/yyyy) as indicated and click Submit. You will be taken to the next sign-up page. 5. Create a LogicNets ID. This will be your LogicNets login ID and cannot be changed, so think of one that is secure and easy to remember. 6. Create a LogicNets password. You can change your password at any time. 7. Enter your Password Reset Question and Answer. This can be used at a later time if you forget your password. 8. Enter your e-mail address. You will receive e-mail notification when new information is available in [address] 9. Click Sign Up. You can now view and download portions of your medical record.  
10. Click the Download Summary menu link to download a portable copy of your medical information. If you have questions, please visit the Frequently Asked Questions section of the Met[employer] website. Remember, iMemories is NOT to be used for urgent needs. For medical emergencies, dial 911. Now available from your iPhone and Android! Introducing Branden Han As a [facility] patient, I wanted to make you aware of our electronic visit tool called Branden Han. [employer] allows you to connect within minutes with a medical provider 24 hours a day, seven days a week via a mobile device or tablet or logging into a secure website from your computer. You can access Branden Han from anywhere in the [place]. A virtual visit might be right for you when you have a simple condition and feel like you just dont want to get out of bed, or cant get away from work for an appointment, when your regular [facility] provider is not available (evenings, weekends or holidays), or when youre out of town and need minor care. Electronic visits cost only $49 and if the [facility] [facility] provider determines a prescription is needed to treat your condition, one can be electronically transmitted to a nearby pharmacy*. Please take a moment to enroll today if you have not already done so. The enrollment process is free and takes just a few minutes. To enroll, please download the [employer] sandra to your tablet or phone, or visit [email or web address] to enroll on your computer. And, as an [address] [place] [address] patient with a Woldme account, the results of your visits will be scanned into your electronic medical record and your primary care provider will be able to view the scanned results. We urge you to continue to see your regular [facility] provider for your ongoing medical care.   And while your primary care provider may not be the one available when you seek a Branden Han virtual visit, the peace of mind you get from getting a real diagnosis real time can be priceless. For more information on Branden Han, view our Frequently Asked Questions (FAQs) at [email or web address]. Sincerely, 
 
Marcia Stephenson MD 
Chief Medical Officer Leandro Wyatt *:  certain medications cannot be prescribed via Branden Han Providers Seen During Your Hospitalization Provider Specialty Primary office phone Annel Elias MD Emergency Medicine [phone] Angelita Trejo MD Family Practice [phone] Eric Araujo MD Internal Medicine [phone] Denver Grad, MD Internal Medicine [phone] Immunizations Administered for This Admission Name Date Influenza Vaccine (Quad) PF 9/25/2018 Your Primary Care Physician (PCP) Primary Care Physician Office Phone Office Fax Matt Vaca [phone] You are allergic to the following Allergen Reactions Amoxicillin-Pot Clavulanate Nausea and Vomiting Cephalexin Nausea and Vomiting Doxycycline Nausea and Vomiting Neomycin-Polymyxin-Hc Nausea and Vomiting Rocephin (Ceftriaxone) Nausea and Vomiting  
    
 Sulfamethoxazole-Trimethoprim Nausea and Vomiting Recent Documentation Height Weight BMI OB Status Smoking Status 1.524 m 72.4 kg 31.17 kg/m2 Hysterectomy Never Smoker Emergency Contacts Name Discharge Info Relation Home Work Mobile [address] CAREGIVER [3] Child [2] [phone] Patient Belongings The following personal items are in your possession at time of discharge: 
  Dental Appliances: None         Home Medications: None   Jewelry: None  Clothing: At bedside    Other Valuables: None Please provide this summary of care documentation to your next provider.  
  
  
 
  
Signatures-by signing, you are acknowledging that this After Visit Summary has been reviewed with you and you have received a copy. Patient Signature:  ____________________________________________________________ Date:  ____________________________________________________________  
  
Giovanny Shove Provider Signature:  ____________________________________________________________ Date:  ____________________________________________________________

## 2018-09-22 LAB
ANION GAP SERPL CALC-SCNC: 11 MMOL/L (ref 5–15)
ATRIAL RATE: 89 BPM
BASOPHILS # BLD: 0 K/UL (ref 0–0.1)
BASOPHILS NFR BLD: 0 % (ref 0–1)
BUN SERPL-MCNC: 17 MG/DL (ref 6–20)
BUN/CREAT SERPL: 18 (ref 12–20)
CALCIUM SERPL-MCNC: 8.5 MG/DL (ref 8.5–10.1)
CALCULATED P AXIS, ECG09: 2 DEGREES
CALCULATED R AXIS, ECG10: -25 DEGREES
CALCULATED T AXIS, ECG11: 28 DEGREES
CHLORIDE SERPL-SCNC: 110 MMOL/L (ref 97–108)
CO2 SERPL-SCNC: 23 MMOL/L (ref 21–32)
CREAT SERPL-MCNC: 0.94 MG/DL (ref 0.55–1.02)
DIAGNOSIS, 93000: NORMAL
DIFFERENTIAL METHOD BLD: ABNORMAL
EOSINOPHIL # BLD: 0.1 K/UL (ref 0–0.4)
EOSINOPHIL NFR BLD: 1 % (ref 0–7)
ERYTHROCYTE [DISTWIDTH] IN BLOOD BY AUTOMATED COUNT: 15.4 % (ref 11.5–14.5)
GLUCOSE BLD STRIP.AUTO-MCNC: 93 MG/DL (ref 65–100)
GLUCOSE SERPL-MCNC: 100 MG/DL (ref 65–100)
HCT VFR BLD AUTO: 35.6 % (ref 35–47)
HGB BLD-MCNC: 11.3 G/DL (ref 11.5–16)
IMM GRANULOCYTES # BLD: 0 K/UL (ref 0–0.04)
IMM GRANULOCYTES NFR BLD AUTO: 0 % (ref 0–0.5)
LYMPHOCYTES # BLD: 2.9 K/UL (ref 0.8–3.5)
LYMPHOCYTES NFR BLD: 36 % (ref 12–49)
MAGNESIUM SERPL-MCNC: 2 MG/DL (ref 1.6–2.4)
MCH RBC QN AUTO: 31.3 PG (ref 26–34)
MCHC RBC AUTO-ENTMCNC: 31.7 G/DL (ref 30–36.5)
MCV RBC AUTO: 98.6 FL (ref 80–99)
MONOCYTES # BLD: 0.7 K/UL (ref 0–1)
MONOCYTES NFR BLD: 8 % (ref 5–13)
NEUTS SEG # BLD: 4.3 K/UL (ref 1.8–8)
NEUTS SEG NFR BLD: 54 % (ref 32–75)
NRBC # BLD: 0 K/UL (ref 0–0.01)
NRBC BLD-RTO: 0 PER 100 WBC
P-R INTERVAL, ECG05: 172 MS
PHOSPHATE SERPL-MCNC: 3.2 MG/DL (ref 2.6–4.7)
PLATELET # BLD AUTO: 271 K/UL (ref 150–400)
PMV BLD AUTO: 9.7 FL (ref 8.9–12.9)
POTASSIUM SERPL-SCNC: 3.3 MMOL/L (ref 3.5–5.1)
Q-T INTERVAL, ECG07: 356 MS
QRS DURATION, ECG06: 68 MS
QTC CALCULATION (BEZET), ECG08: 433 MS
RBC # BLD AUTO: 3.61 M/UL (ref 3.8–5.2)
SERVICE CMNT-IMP: NORMAL
SODIUM SERPL-SCNC: 144 MMOL/L (ref 136–145)
VENTRICULAR RATE, ECG03: 89 BPM
WBC # BLD AUTO: 8 K/UL (ref 3.6–11)

## 2018-09-22 PROCEDURE — 83735 ASSAY OF MAGNESIUM: CPT | Performed by: FAMILY MEDICINE

## 2018-09-22 PROCEDURE — 84100 ASSAY OF PHOSPHORUS: CPT | Performed by: FAMILY MEDICINE

## 2018-09-22 PROCEDURE — 82962 GLUCOSE BLOOD TEST: CPT

## 2018-09-22 PROCEDURE — 80048 BASIC METABOLIC PNL TOTAL CA: CPT | Performed by: FAMILY MEDICINE

## 2018-09-22 PROCEDURE — 93306 TTE W/DOPPLER COMPLETE: CPT

## 2018-09-22 PROCEDURE — 93970 EXTREMITY STUDY: CPT

## 2018-09-22 PROCEDURE — 74011250637 HC RX REV CODE- 250/637: Performed by: HOSPITALIST

## 2018-09-22 PROCEDURE — 74011250637 HC RX REV CODE- 250/637: Performed by: FAMILY MEDICINE

## 2018-09-22 PROCEDURE — 74011250636 HC RX REV CODE- 250/636: Performed by: FAMILY MEDICINE

## 2018-09-22 PROCEDURE — 65660000000 HC RM CCU STEPDOWN

## 2018-09-22 PROCEDURE — 36415 COLL VENOUS BLD VENIPUNCTURE: CPT | Performed by: FAMILY MEDICINE

## 2018-09-22 PROCEDURE — 85025 COMPLETE CBC W/AUTO DIFF WBC: CPT | Performed by: FAMILY MEDICINE

## 2018-09-22 PROCEDURE — 93880 EXTRACRANIAL BILAT STUDY: CPT

## 2018-09-22 RX ORDER — METOPROLOL SUCCINATE 50 MG/1
50 TABLET, EXTENDED RELEASE ORAL DAILY
Status: DISCONTINUED | OUTPATIENT
Start: 2018-09-22 | End: 2018-09-25 | Stop reason: HOSPADM

## 2018-09-22 RX ORDER — ALLOPURINOL 300 MG/1
300 TABLET ORAL DAILY
Status: DISCONTINUED | OUTPATIENT
Start: 2018-09-22 | End: 2018-09-25 | Stop reason: HOSPADM

## 2018-09-22 RX ORDER — GABAPENTIN 300 MG/1
300 CAPSULE ORAL 3 TIMES DAILY
Status: DISCONTINUED | OUTPATIENT
Start: 2018-09-22 | End: 2018-09-25 | Stop reason: HOSPADM

## 2018-09-22 RX ORDER — ACETAMINOPHEN 325 MG/1
650 TABLET ORAL
Status: DISCONTINUED | OUTPATIENT
Start: 2018-09-22 | End: 2018-09-25 | Stop reason: HOSPADM

## 2018-09-22 RX ORDER — ASPIRIN 81 MG/1
81 TABLET ORAL DAILY
Status: DISCONTINUED | OUTPATIENT
Start: 2018-09-22 | End: 2018-09-25 | Stop reason: HOSPADM

## 2018-09-22 RX ORDER — ATORVASTATIN CALCIUM 20 MG/1
20 TABLET, FILM COATED ORAL
Status: DISCONTINUED | OUTPATIENT
Start: 2018-09-22 | End: 2018-09-25 | Stop reason: HOSPADM

## 2018-09-22 RX ORDER — TRAMADOL HYDROCHLORIDE 50 MG/1
50 TABLET ORAL 2 TIMES DAILY
Status: DISCONTINUED | OUTPATIENT
Start: 2018-09-22 | End: 2018-09-22

## 2018-09-22 RX ORDER — CLOPIDOGREL BISULFATE 75 MG/1
75 TABLET ORAL DAILY
Status: DISCONTINUED | OUTPATIENT
Start: 2018-09-22 | End: 2018-09-24

## 2018-09-22 RX ORDER — CETIRIZINE HCL 10 MG
10 TABLET ORAL DAILY
Status: DISCONTINUED | OUTPATIENT
Start: 2018-09-22 | End: 2018-09-25 | Stop reason: HOSPADM

## 2018-09-22 RX ORDER — LISINOPRIL 10 MG/1
10 TABLET ORAL DAILY
Status: DISCONTINUED | OUTPATIENT
Start: 2018-09-22 | End: 2018-09-25 | Stop reason: HOSPADM

## 2018-09-22 RX ORDER — MELATONIN
1000 DAILY
Status: DISCONTINUED | OUTPATIENT
Start: 2018-09-23 | End: 2018-09-25 | Stop reason: HOSPADM

## 2018-09-22 RX ADMIN — ATORVASTATIN CALCIUM 20 MG: 20 TABLET, FILM COATED ORAL at 23:25

## 2018-09-22 RX ADMIN — LISINOPRIL 10 MG: 10 TABLET ORAL at 09:10

## 2018-09-22 RX ADMIN — ASPIRIN 81 MG: 81 TABLET, COATED ORAL at 12:53

## 2018-09-22 RX ADMIN — GABAPENTIN 300 MG: 300 CAPSULE ORAL at 23:25

## 2018-09-22 RX ADMIN — Medication 10 ML: at 15:58

## 2018-09-22 RX ADMIN — SODIUM CHLORIDE 100 ML/HR: 900 INJECTION, SOLUTION INTRAVENOUS at 08:05

## 2018-09-22 RX ADMIN — ACETAMINOPHEN 650 MG: 325 TABLET ORAL at 04:37

## 2018-09-22 RX ADMIN — METOPROLOL SUCCINATE 50 MG: 50 TABLET, EXTENDED RELEASE ORAL at 09:10

## 2018-09-22 RX ADMIN — ACETAMINOPHEN 650 MG: 325 TABLET ORAL at 17:41

## 2018-09-22 RX ADMIN — Medication 10 ML: at 07:23

## 2018-09-22 RX ADMIN — ALLOPURINOL 300 MG: 300 TABLET ORAL at 09:10

## 2018-09-22 RX ADMIN — CLOPIDOGREL BISULFATE 75 MG: 75 TABLET ORAL at 09:09

## 2018-09-22 RX ADMIN — Medication 10 ML: at 23:18

## 2018-09-22 RX ADMIN — GABAPENTIN 300 MG: 300 CAPSULE ORAL at 15:57

## 2018-09-22 RX ADMIN — CETIRIZINE HYDROCHLORIDE 10 MG: 10 TABLET, FILM COATED ORAL at 09:10

## 2018-09-22 NOTE — CONSULTS
[address]    Vishnu Blind.  MR#: [id number]  : 1933  ACCOUNT #: [de-identified]   DATE OF SERVICE: 2018    REQUESTING PHYSICIAN:  Dr. Whitney Juan EVALUATION:  Confusion. HISTORY OF PRESENT ILLNESS:  Patient is an 25-year-old female with history of breast cancer status post lumpectomy and radiation, cardiomyopathy, coronary artery disease status post stenting, hypertension, hyperlipidemia who had a total hip replacement in 2018 and had been in rehab and was doing quite well and was recovering quite well. She was discharged home about 4 days ago. She states that while she was in rehabilitation she would get bouts of confusion and states that her mind would feel befuddled. There is a report that she was having visual hallucinations and called her family/friends that there was water in her house. She does say that it was actually the case and she was not imagining things. She has also been having difficulty with visual focusing and saw an eye doctor recently who told her to follow up in 2 months, as she may need some corrective procedure to correct her focusing ability. She was admitted for further workup. She denied any associated headache, focal motor or sensory deficits, nausea, vomiting, chest pain, shortness of breath, palpitations. Her balance and gait have been steadily improving with rehabilitation. PAST MEDICAL HISTORY:  As mentioned above. HOME MEDICATIONS:  Allopurinol, aspirin, atorvastatin, cetirizine, gabapentin, lisinopril, tramadol, nitroglycerin. ALLERGIES:  AMOXICILLIN, CEPHALEXIN, DOXYCYCLINE, NEOSPORIN, POLYMYXIN, ROCEPHIN, BACTRIM. SOCIAL HISTORY:  No history of smoking or alcohol use. She is currently living by herself. She is . REVIEW OF SYSTEMS:  A 10-point review of systems was done and it was negative except for symptoms mentioned in the HPI.     PHYSICAL EXAMINATION:  GENERAL:  The patient is lying in bed in no acute distress. VITAL SIGNS:  Blood pressure 108/53, temperature 98.2, pulse of 61. NEUROLOGIC:  Speech is clear. Comprehension is normal.  Pupils equal, round, reactive. Extraocular movements are full. Short-term and long-term memory is intact. She did quite well on mini mental status exam and scored 28/30. Short-term recall was 2/3. Muscle tone and bulk normal.  Strength normal in both upper and lower extremities. Deep tendon reflexes 2/2 and symmetric. Toes downgoing. Sensation intact. Gait is baseline. HEART:  Regular rate and rhythm. LUNGS:  Clear. ABDOMEN:  Soft, nontender. EXTREMITIES:  No edema. LABORATORY DATA:  CBC is unremarkable. Chemistries initially showed a BUN of 21 and creatinine 1.37, which is now improved to 17 and 0.94. TSH 0.74. CTA of the head and neck in August of last year was unremarkable. CT of brain done yesterday did not show any acute abnormalities. There is an old left MCA infarct. Echocardiogram shows an ejection fraction of around 30%. Atrial septum was intact. ASSESSMENT AND PLAN:  An 80-year-old female who is admitted with symptoms of confusion and questionable hallucinations. Her workup has been negative so far except for some dehydration, which has improved. Overall, she feels better today than yesterday and I suspect that this may just be underlying metabolic encephalopathy. She does have an old infarct on her brain imaging and agree with MRI scan of the brain to rule out any new infarction, assess for white matter disease and atrophy. If this is negative, she could be discharged home from a neurological standpoint. She did quite well on her screening Cognitive Assessment, but if underlying memory disorder is a concern, she can be followed up as an outpatient. Thank you for this consultation.       MD MICHELLE Conte / Jasen Beach  D: 09/22/2018 16:04     T: 09/22/2018 17:21  JOB #: [id number]

## 2018-09-22 NOTE — CONSULTS
Consult dictated. Admitted with symptoms of confusion and questionable hallucinations. Suspect metabolic encephalopathy. Feels much better today. Workup negative so far. She did well on MMSE. Agree with MRI and if negative, may DC home.    Alysia Mejia MD

## 2018-09-22 NOTE — ROUTINE PROCESS
TRANSFER - IN REPORT: 
 
Verbal report received from Staff RN(name) on Jace Lindsey  being received from 676(unit) for routine progression of care Report consisted of patients Situation, Background, Assessment and  
Recommendations(SBAR). Information from the following report(s) SBAR, Kardex and MAR was reviewed with the receiving nurse. Opportunity for questions and clarification was provided. Assessment completed upon patients arrival to unit and care assumed.

## 2018-09-22 NOTE — PROCEDURES
[address]  *** FINAL REPORT ***    Name: Bhupendra Pak  MRN: [id number]    Inpatient  : 1933  HIS Order #: [id number]  [address] [facility] Visit #: [id number]  Date: 22 Sep 2018    TYPE OF TEST: Cerebrovascular Duplex    REASON FOR TEST  AMS/visual disturbance    Right Carotid:-             Proximal               Mid                 Distal  cm/s  Systolic  Diastolic  Systolic  Diastolic  Systolic  Diastolic  CCA:     92.3      11.0                            59.0      13.0  Bulb:  ECA:    125.0  ICA:     56.0      14.0       59.0      19.0       62.0      18.0  ICA/CCA:  0.9       1.1    ICA Stenosis:    Right Vertebral:-  Finding: Antegrade  Sys:       34.0  Shiela:    Right Subclavian:    Left Carotid:-            Proximal                Mid                 Distal  cm/s  Systolic  Diastolic  Systolic  Diastolic  Systolic  Diastolic  CCA:     45.2      18.0                            47.0       9.0  Bulb:  ECA:     78.0  ICA:     41.0      12.0       63.0      22.0       64.0      19.0  ICA/CCA:  0.9       1.3    ICA Stenosis:    Left Vertebral:-  Finding: Antegrade  Sys:       52.0  Shiela:    Left Subclavian:    INTERPRETATION/FINDINGS  PROCEDURE:  Color duplex ultrasound imaging of extracranial  cerebrovascular arteries. FINDINGS:       Right:  Internal carotid velocity is within normal limits. There   is narrowing of the internal carotid flow channel on color Doppler  imaging and calcific plaque on B-mode imaging, consistent with less  than 50 percent stenosis. The common and external carotid arteries  are patent and without evidence of hemodynamically significant  stenosis. Left:  Internal carotid velocity is within normal limits. There  is narrowing of the internal carotid flow channel on color Doppler  imaging and calcific plaque on B-mode imaging, consistent with less  than 50 percent stenosis.   The common and external carotid arteries  are patent and without evidence of hemodynamically significant  stenosis. IMPRESSION:  Consistent with less than 50% stenosis of the internal  carotids. Vertebrals are patent with antegrade flow. ADDITIONAL COMMENTS    I have personally reviewed the data relevant to the interpretation of  this  study.     TECHNOLOGIST: Simi Boucher RVT  Signed: 09/22/2018 09:59 AM    PHYSICIAN: Kristina Allen MD  Signed: 09/24/2018 06:34 AM

## 2018-09-22 NOTE — PROGRESS NOTES
1695 Dr. Andrea Lenz paged. Dr. Andrea Lenz called back and notified of patient's headache, new orders obtained.

## 2018-09-22 NOTE — PROGRESS NOTES
Bedside shift change report given Wallace (oncoming nurse) by He Pan (offgoing nurse). Report included the following information SBAR, Kardex and Cardiac Rhythm NSR.

## 2018-09-22 NOTE — PROCEDURES
[facility]  *** FINAL REPORT ***    Name: Arpita Johnston  MRN: [id number]    Inpatient  : 1933  HIS Order #: [id number]  [address] [facility] Visit #: [id number]  Date: 22 Sep 2018    TYPE OF TEST: Peripheral Venous Testing    REASON FOR TEST  Pain in limb, Limb swelling    Right Leg:-  Deep venous thrombosis:           No  Superficial venous thrombosis:    No  Deep venous insufficiency:        Not examined  Superficial venous insufficiency: Not examined    Left Leg:-  Deep venous thrombosis:           No  Superficial venous thrombosis:    No  Deep venous insufficiency:        Not examined  Superficial venous insufficiency: Not examined      INTERPRETATION/FINDINGS  PROCEDURE:  Color duplex ultrasound imaging of lower extremity veins. FINDINGS:       Right: The common femoral, deep femoral, femoral, popliteal,  posterior tibial, peroneal, and great saphenous are patent and without   evidence of thrombus;  each is fully compressible and there is no  narrowing of the flow channel on color Doppler imaging. Phasic flow  is observed in the common femoral vein. Left:   The common femoral, deep femoral, femoral, popliteal,  posterior tibial, peroneal, and great saphenous are patent and without   evidence of thrombus;  each is fully compressible and there is no  narrowing of the flow channel on color Doppler imaging. Phasic flow  is observed in the common femoral vein. IMPRESSION:  No evidence of right or left lower extremity vein  thrombosis. ADDITIONAL COMMENTS    I have personally reviewed the data relevant to the interpretation of  this  study.     TECHNOLOGIST: Rika Beach RVT  Signed: 2018 12:27 PM    PHYSICIAN: Chris Melchor MD  Signed: 2018 06:33 AM

## 2018-09-22 NOTE — PROGRESS NOTES
Problem: Falls - Risk of 
Goal: *Absence of Falls Document Jazmin Devi Fall Risk and appropriate interventions in the flowsheet. Outcome: Progressing Towards Goal 
Fall Risk Interventions: 
Mobility Interventions: Communicate number of staff needed for ambulation/transfer Medication Interventions: Patient to call before getting OOB Elimination Interventions: Call light in reach, Patient to call for help with toileting needs, Toilet paper/wipes in reach

## 2018-09-22 NOTE — H&P
[address] [facility] HISTORY AND PHYSICAL Jace Ellis 
MR#: [id number] : 1933 ACCOUNT #: [de-identified] ADMIT DATE: 2018 CHIEF COMPLAINT:  Confusion, vision changes. 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 The patient is a limited historian. She is accompanied by her granddaughter and daughter, all the family members. Granddaughter however provides majority of additional history. Remainder of history was obtained from review of ED and electronic medical records. Per these collective reports, the patient notes she has been having some confusion and vision changes since she had her right total hip replacement on 2018. She reportedly was discharged to the [facility] only recently returning home approximately 4 days ago. Patient notes she has had on and off bouts of aforementioned symptoms, which now have been more persistent without specific exacerbating or alleviating factors. Since returning home her granddaughter notes that she has noticed that the patient has been sleeping most of the day, even lethargic, worsened over the last 3 days with increased confusion and occasional bouts of visual hallucinations. She notes that the patient thought that the home was flooded and also thought that she went to the doctor's office yesterday, which had not occurred.   She reportedly has had trouble with her vision, for which she has been seen by an eye doctor and told that she had some problems with the vessels in the back of her eye, which they thought was the result of having low blood pressure as a result of anesthesia during her last surgery. Her granddaughter notes that no specific diagnosis has been given however the eye doctor thought that she would need to be evaluated in the hospital.  The patient reportedly had a stroke approximately 11 months ago and had problems at that time with finding her words. She has difficulty with speaking; however, she does not report any similar symptoms at this time. She does not report any current numbness, paresthesias, focal weakness. Her granddaughter notes the patient had had some dizziness and lightheadedness recently. The patient notes that she had been able to drive but has not since having her surgery due to her right knee. She does not report any syncope, loss of consciousness, headache, neck pain, back pain, chest pain, palpitations, abdominal pain, melena, hematuria, calf pain, swelling, edema, fever, chills or rash. Her granddaughter notes the patient had diarrhea approximately 2 days ago, which has resolved. She also notes the patient has had urinary incontinence. All were concerned the patient had \"kidney stones\" for which the patient does not have any related symptoms at this time. There are no reports of painful urination, stinging or burning. Upon arrival to the emergency department, initial recorded vital signs, blood pressure 121/79, heart rate 94, respiration rate of 18, O2 saturation 92% on room air. Workup included labs showing an elevated BUN of 21, a creatinine of 1.37 (compared to last creatinine 1.25 on 07/31/2018). CT of the head without contrast showed old left MCA infarct with small vessel ischemic changes with no acute abnormality. Chest report showed no acute cardiopulmonary process. A 12-lead EKG showed normal sinus rhythm,  ST changes in inferior leads and 89 beats per minute (which is similar to prior EKGs per review of chart records).    Patient still uses a cane for assisted mobilization. There have been no reports of falls, head or neck trauma. Patient is now seen for admission to the hospitalist service for continued cane evaluation and treatment. Lastly, the patient's granddaughter was concerned the patient may have a \"blood clot\" pointing to her right leg. The patient, however, does not complain of any  
related pain symptoms in that area at this time. PAST MEDICAL HISTORY: 
1. Stroke. 2.  Left breast carcinoma, status post lumpectomy and radiation. 3.  Cardiomyopathy, unspecified. 4.  Chronic pain. 5.  Coronary artery disease per chart records. 6.  Hypertension. 7.  Hyperlipidemia. 8.  Hives, unknown origin. 9.  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 10.  Tophaceous gout. 11.  Venous insufficiency. 12.  Degenerative joint disease. PAST SURGICAL HISTORY: 
1.  Status post left total hip replacement 04/17/2018. 2.  Right total knee replacement 07/30/2018. 3. Appendectomy. 4.  PTCA and stents in 2015. 
5.  Left breast biopsy 1980. 
6.  Left breast lumpectomy 1988. 
7.  Bilateral cataract extraction and intraocular lens implant. 8.  Colonoscopy. 9.  Hysterectomy. 10.  Oral surgery, unspecified. 11.  Removal of squamous cell carcinoma from right forearm 07/2018. 12.  Varicose vein stripping. MEDICATIONS:  Complete medication  list reviewed and noted on chart record. acetaminophen (TYLENOL) 500 mg tablet  9/21/2018  --  --  Historical Provider    
   Take 500 mg by mouth as needed for Pain.  
   allopurinol (ZYLOPRIM) 300 mg tablet  9/21/2018 01/12/18  -- Vero Kohler MD  
   Take 1/2 tablet by mouth daily. For tophaceous gout  
   aspirin delayed-release 81 mg tablet  9/21/2018  --  --  Historical Provider  
   Take 81 mg by mouth daily.  
   Notes:   ADVISED PATIENT TO CONSULT SURGEON AND/OR PRESCRIBING PHYSICIAN REGARDING WHEN TO SUSPEND PRIOR TO DAY OF SURGERY.    atorvastatin (LIPITOR) 20 mg tablet    --  --  Historical Provider  
   Take 20 mg by mouth nightly.  
   Notes:      
   bumetanide (BUMEX) 2 mg tablet  9/21/2018 01/18/18  --  Historical Provider  
   Take 2 mg by mouth daily.  
   Notes:      
   cetirizine (ZYRTEC) 10 mg tablet    --  --  Historical Provider  
   Take 10 mg by mouth daily.  
   cholecalciferol, vitamin D3, (VITAMIN D3 PO)    --  --  Historical Provider  
   Take 1 Tab by mouth daily. Unsure of dose  
   clopidogrel (PLAVIX) 75 mg tab  9/21/2018  --  --  Historical Provider  
   Take 75 mg by mouth daily.  
   Notes:   ADVISED PATIENT TO CONSULT SURGEON AND/OR PRESCRIBING PHYSICIAN REGARDING WHEN TO SUSPEND PRIOR TO DAY OF SURGERY.  
   gabapentin (NEURONTIN) 300 mg capsule  9/21/2018 03/12/18  --  ALIREZA Muniz MD  
   TAKE 1 CAPSULE BY MOUTH THREE TIMES A DAY  
   Notes:   PATIENT INSTRUCTED TO  TAKE PRIOR TO LEAVING HOME WITH A SIP OF WATER ,DAY OF SURGERY PER ANESTHESIA GUIDELINES.   
   lisinopril (PRINIVIL, ZESTRIL) 10 mg tablet  9/21/2018  --  --  Historical Provider  
   Take 10 mg by mouth daily.  
   Notes:      
   metoprolol succinate (TOPROL-XL) 50 mg XL tablet    01/24/18  --  Historical Provider  
   TAKE 1&1/2 TABLETS BY MOUTH EVERY HS  
   Notes:      
   multivitamin (ONE A DAY) tablet  9/21/2018  --  --  Historical Provider  
   Take 1 Tab by mouth daily.  
   Notes:   PER ANESTHESIA GUIDELINES, THE PATIENT WAS ADVISED TO SUSPEND TAKING THIS MEDICATION 7 DAYS PRIOR TO SURGERY.  
   nitroglycerin (NITROSTAT) 0.4 mg SL tablet    12/26/17  --  Historical Provider  
   every five (5) minutes as needed.  
   Notes:      
   traMADol (ULTRAM) 50 mg tablet    --  --  Historical Provider  
   Take 50 mg by mouth two (2) times a day.  
   triamcinolone acetonide (KENALOG) 0.025 % topical cream    --  --  Historical Provider  
   Apply  to affected area as needed.  
   Notes:      
   
 
 
ALLERGIES: 
 1. AMOXICILLIN CLAVULANATE (AUGMENTIN). 2.  CEPHALEXIN. 3.  DOXYCYCLINE. 4.  NEOSPORIN, POLYMYXIN. 5.  ROCEPHIN. 6.  TRIMETHOPRIM/SULFAMETHOXAZOLE . SOCIAL HISTORY:  No reports of smoking, alcohol or illicit drugs. FAMILY HISTORY:  Breast cancer, sister. Diabetes, father. Pancreatic cancer in mother. Peripheral vascular disease, father. Heart disease, brother. Heart failure, son and hypertension, son. REVIEW OF SYSTEMS:  Pertinent positives as noted in HPI. All other systems were reviewed and negative. PHYSICAL EXAMINATION: 
VITAL SIGNS:  Temperature is 98.1 degrees Fahrenheit, blood pressure 113/61, heart rate 85, respiration rate of 14, O2 saturation 99% on room air. Recorded weight 150 pounds (68 kg), height 5 feet 0 inches tall. GENERAL:  Overweight female in no acute respiratory distress. PSYCHIATRIC:  The patient is awake, alert, oriented x3. NEUROLOGIC:  GCS of 15. Moves extremities x4. Sensation grossly intact without slurred speech or facial droop. No pronator drift. HEENT:  Normocephalic, atraumatic. PERRLA. EOMs intact. Sclerae anicteric. Conjunctivae clear. Nares are patent. Tongue is midline, nonedematous. NECK:  Supple, without lymphadenopathy, JVD, carotid bruits or thyromegaly. LYMPH:  Negative for cervical or supraclavicular adenopathy. RESPIRATORY:  Lungs  clear to auscultation bilaterally. CARDIOVASCULAR:   Heart regular rate and rhythm. Normal S1, S2, without murmurs, rubs or gallops. GASTROINTESTINAL:  Abdomen is soft, nontender, nondistended. Normoactive bowel sounds. No rebound, guarding, rigidity. No auscultated abdominal bruits or palpable abdominal mass. BACK:  No CVA tenderness. No step-off deformity. MUSCULOSKELETAL:  No acute palpable bony deformity. Negative for calf tenderness. VASCULAR:  2+ radial, 1+ dorsalis pedis pulses without cyanosis or clubbing. There is trace lower extremity nonpitting edema. SKIN:  Warm and dry. Right knee wound is well healed. LABORATORY DATA:  Reviewed as follows:  Sodium 145, potassium 3.6, chloride 107, CO2 of 28, BUN 21, creatinine 1.37, glucose 132, anion gap 10, calcium 9.3, GFR 37, total bilirubin 0.4, total protein 7.9, albumin 3.8, ALT 36, AST of 30, alkaline phosphatase 114. Troponin I of less than 0.05. WBC 8.7, hemoglobin of 13.7, hematocrit 43.3, platelets of 743, neutrophils 53%. Urinalysis:  Leukocyte esterase small, nitrites negative, urobilinogen 0.2, bilirubin negative, blood negative, ketones negative, glucose negative, protein negative, pH 5.0, specific gravity 1.016, WBC 5-10, RBCs 0-5, bacteria negative. INR 1.0, PT 10.0, PTT 24.9. CT of the head without contrast  results were reviewed and noted in HPI. Chest x-ray, portable, no acute process. A 12-lead EKG was also reviewed and noted in HPI. IMPRESSION AND PLAN: 
1. Altered mental status. Admit to neuro/stroke floor. Rule out possible stroke. Order MRI of the brain, carotid Doppler,  2D echocardiogram.  Consult with neurologist in the a.m. Check acetaminophen, salicylates, TSH levels. Urinalysis was not definite for a urinary tract infection. Consider for metabolic encephalopathy. Treat for dehydration. 2.  Dehydration with elevated BUN and creatinine. Order IV fluids, hydration resuscitation. Repeat the renal panel in the a.m. Place on strict I's, O's, daily weights. 3.  Visual disturbance. The patient has been evaluated by ophthalmologist in regard to the same per the family's report. No new visual changes other than those reported. Continue her workup as noted above. 4.  Memory loss has also been reported. Continue workup as noted. Currently, it is oriented x3 without any diagnosed dementia. 5.  Coronary artery disease. Continue on aspirin therapy. 6.  Hyperlipidemia. Check lipid panel. Continue on atorvastatin. 7.  Hypertension. Continue current blood pressure medication. Repeat vital signs. 8.  History of stroke, currently on Plavix. 9.  Generalized weakness/debility. Place on fall precautions. 10.  Hallucinations. Continue with neurologic workup as noted. Consider for psychiatry consultation if workup is negative. 11.  Venous thromboembolism prophylaxis. Sequential compression devices to lower extremities. MD CESAR Deshpande/CLYDE 
D: 09/21/2018 21:24    
T: 09/21/2018 23:06 JOB #: [id number]

## 2018-09-22 NOTE — PROGRESS NOTES
Admission Medication Reconciliation: 
 
Information obtained from:  Visitor/RxQuery Comments/Recommendations: Updated PTA meds/reviewed patient's allergies. 1)  Spoke with visitor (family) who takes care of patient's medications. Patient took her morning medications today. 2)  Medication changes (since last review): Added 
-Tramadol 50mg po BID 
-Cholecalciferol po (unknown dose) 1 tab daily 
-Cetirizine 10mg po daily 
-Acetaminophen 500mg po as needed Adjusted 
-None Removed 
-Oxycodone IR 5mg tablet Allergies:  Amoxicillin-pot clavulanate; Cephalexin; Doxycycline; Neomycin-polymyxin-hc; Rocephin [ceftriaxone]; and Sulfamethoxazole-trimethoprim Significant PMH/Disease States:  
Past Medical History:  
Diagnosis Date  Breast cancer ([facility] 1997  
 lumpectomy, RADIATION  
 Cancer [facility]) 2002  
 dcis left breast 2002  Cardiomyopathy ([facility]  Chronic pain  Coronary artery disease involving native coronary artery of native heart without angina pectoris 12/16/2016  
 multiple stents  CVA (cerebral vascular accident) ([facility] 09/13/2017  Dyslipidemia  High cholesterol  Hives of unknown origin  Hypertension  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  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  Venous insufficiency 5/17/2011 Chief Complaint for this Admission: Chief Complaint Patient presents with  Lethargy Prior to Admission Medications:  
Prior to Admission Medications Prescriptions Last Dose Informant Patient Reported? Taking?  
acetaminophen (TYLENOL) 500 mg tablet 9/21/2018 at Unknown time  Yes Yes Sig: Take 500 mg by mouth as needed for Pain. allopurinol (ZYLOPRIM) 300 mg tablet 9/21/2018 at 0800  No Yes Sig: Take 1/2 tablet by mouth daily. For tophaceous gout  
aspirin delayed-release 81 mg tablet 9/21/2018 at 0800  Yes Yes Sig: Take 81 mg by mouth daily. atorvastatin (LIPITOR) 20 mg tablet   Yes Yes Sig: Take 20 mg by mouth nightly. bumetanide (BUMEX) 2 mg tablet 9/21/2018 at 0800  Yes Yes Sig: Take 2 mg by mouth daily. cetirizine (ZYRTEC) 10 mg tablet   Yes Yes Sig: Take 10 mg by mouth daily. cholecalciferol, vitamin D3, (VITAMIN D3 PO)   Yes Yes Sig: Take 1 Tab by mouth daily. Unsure of dose  
clopidogrel (PLAVIX) 75 mg tab 9/21/2018 at 0800  Yes Yes Sig: Take 75 mg by mouth daily. gabapentin (NEURONTIN) 300 mg capsule 9/21/2018 at 0800  No Yes Sig: TAKE 1 CAPSULE BY MOUTH THREE TIMES A DAY  
lisinopril (PRINIVIL, ZESTRIL) 10 mg tablet 9/21/2018 at 0800  Yes Yes Sig: Take 10 mg by mouth daily. metoprolol succinate (TOPROL-XL) 50 mg XL tablet   Yes Yes Sig: TAKE 1&1/2 TABLETS BY MOUTH EVERY HS  
multivitamin (ONE A DAY) tablet 9/21/2018 at 0800  Yes Yes Sig: Take 1 Tab by mouth daily. nitroglycerin (NITROSTAT) 0.4 mg SL tablet   Yes No  
Sig: every five (5) minutes as needed. traMADol (ULTRAM) 50 mg tablet   Yes Yes Sig: Take 50 mg by mouth two (2) times a day. triamcinolone acetonide (KENALOG) 0.025 % topical cream   Yes Yes Sig: Apply  to affected area as needed. Facility-Administered Medications: None

## 2018-09-23 ENCOUNTER — APPOINTMENT (OUTPATIENT)
Dept: MRI IMAGING | Age: 83
DRG: 066 | End: 2018-09-23
Attending: FAMILY MEDICINE
Payer: MEDICARE

## 2018-09-23 ENCOUNTER — APPOINTMENT (OUTPATIENT)
Dept: CT IMAGING | Age: 83
DRG: 066 | End: 2018-09-23
Attending: PSYCHIATRY & NEUROLOGY
Payer: MEDICARE

## 2018-09-23 PROBLEM — I63.40 CEREBROVASCULAR ACCIDENT (CVA) DUE TO EMBOLISM OF CEREBRAL ARTERY (HCC): Status: ACTIVE | Noted: 2018-09-23

## 2018-09-23 PROCEDURE — 97161 PT EVAL LOW COMPLEX 20 MIN: CPT

## 2018-09-23 PROCEDURE — 74011250636 HC RX REV CODE- 250/636: Performed by: INTERNAL MEDICINE

## 2018-09-23 PROCEDURE — 70553 MRI BRAIN STEM W/O & W/DYE: CPT

## 2018-09-23 PROCEDURE — 74011250637 HC RX REV CODE- 250/637: Performed by: FAMILY MEDICINE

## 2018-09-23 PROCEDURE — G8979 MOBILITY GOAL STATUS: HCPCS

## 2018-09-23 PROCEDURE — 74011250637 HC RX REV CODE- 250/637: Performed by: HOSPITALIST

## 2018-09-23 PROCEDURE — 97116 GAIT TRAINING THERAPY: CPT

## 2018-09-23 PROCEDURE — 74011250637 HC RX REV CODE- 250/637: Performed by: INTERNAL MEDICINE

## 2018-09-23 PROCEDURE — A9575 INJ GADOTERATE MEGLUMI 0.1ML: HCPCS | Performed by: INTERNAL MEDICINE

## 2018-09-23 PROCEDURE — G8978 MOBILITY CURRENT STATUS: HCPCS

## 2018-09-23 PROCEDURE — 87086 URINE CULTURE/COLONY COUNT: CPT | Performed by: INTERNAL MEDICINE

## 2018-09-23 PROCEDURE — 65660000000 HC RM CCU STEPDOWN

## 2018-09-23 RX ORDER — POTASSIUM CHLORIDE 750 MG/1
20 TABLET, FILM COATED, EXTENDED RELEASE ORAL
Status: COMPLETED | OUTPATIENT
Start: 2018-09-23 | End: 2018-09-23

## 2018-09-23 RX ORDER — GADOTERATE MEGLUMINE 376.9 MG/ML
15 INJECTION INTRAVENOUS
Status: COMPLETED | OUTPATIENT
Start: 2018-09-23 | End: 2018-09-23

## 2018-09-23 RX ORDER — SODIUM CHLORIDE 0.9 % (FLUSH) 0.9 %
10 SYRINGE (ML) INJECTION
Status: DISPENSED | OUTPATIENT
Start: 2018-09-23 | End: 2018-09-24

## 2018-09-23 RX ADMIN — Medication 10 ML: at 06:41

## 2018-09-23 RX ADMIN — GABAPENTIN 300 MG: 300 CAPSULE ORAL at 22:01

## 2018-09-23 RX ADMIN — GADOTERATE MEGLUMINE 15 ML: 376.9 INJECTION INTRAVENOUS at 11:06

## 2018-09-23 RX ADMIN — ACETAMINOPHEN 650 MG: 325 TABLET ORAL at 13:31

## 2018-09-23 RX ADMIN — POTASSIUM CHLORIDE 20 MEQ: 750 TABLET, FILM COATED, EXTENDED RELEASE ORAL at 11:45

## 2018-09-23 RX ADMIN — ALLOPURINOL 300 MG: 300 TABLET ORAL at 08:12

## 2018-09-23 RX ADMIN — LISINOPRIL 10 MG: 10 TABLET ORAL at 11:45

## 2018-09-23 RX ADMIN — CETIRIZINE HYDROCHLORIDE 10 MG: 10 TABLET, FILM COATED ORAL at 08:13

## 2018-09-23 RX ADMIN — ATORVASTATIN CALCIUM 20 MG: 20 TABLET, FILM COATED ORAL at 22:01

## 2018-09-23 RX ADMIN — ASPIRIN 81 MG: 81 TABLET, COATED ORAL at 08:12

## 2018-09-23 RX ADMIN — ACETAMINOPHEN 650 MG: 325 TABLET ORAL at 22:01

## 2018-09-23 RX ADMIN — GABAPENTIN 300 MG: 300 CAPSULE ORAL at 08:12

## 2018-09-23 RX ADMIN — CLOPIDOGREL BISULFATE 75 MG: 75 TABLET ORAL at 08:12

## 2018-09-23 RX ADMIN — Medication 10 ML: at 22:02

## 2018-09-23 RX ADMIN — VITAMIN D, TAB 1000IU (100/BT) 1000 UNITS: 25 TAB at 08:12

## 2018-09-23 RX ADMIN — GABAPENTIN 300 MG: 300 CAPSULE ORAL at 17:33

## 2018-09-23 RX ADMIN — Medication 10 ML: at 13:32

## 2018-09-23 RX ADMIN — METOPROLOL SUCCINATE 50 MG: 50 TABLET, EXTENDED RELEASE ORAL at 11:45

## 2018-09-23 NOTE — PROGRESS NOTES
ENA Mckeon is a 80 y.o. female who is admitted with symptoms of confusion and questionable hallucinations. She has also reported visual difficulties for the past few months. Clinically she is much better but still has difficulties with word finding and thought processing. MRI brain completed today shows evidence of acute infarction in the right occipital cortex. There is evidence of restricted diffusion in the left frontal lobe cortex as well. Extensive white matter disease. Denies any new complaints EXAM 
Exam: 
Visit Vitals  /78 (BP 1 Location: Left arm, BP Patient Position: At rest)  Pulse 83  Temp 98.9 °F (37.2 °C)  Resp 17  Ht 5' (1.524 m)  Wt 72.8 kg (160 lb 6.4 oz)  SpO2 96%  BMI 31.33 kg/m2 Gen: Alert CV: RRR Lungs: non labored breathing Abd: non distending Neuro: A&O x 3, no dysarthria or aphasia CN II-XII: PERRL, EOMI, face symmetric, tongue/palate midline Motor: strength 5/5 all four ext Sensory: intact to LT Gait: steady LABS/ IMAGING Echocardiogram reviewed with EF of about 30% Lab Results Component Value Date/Time Hemoglobin A1c 6.0 07/19/2018 02:23 PM  
 
MRI Results (most recent): 
 
Results from Hospital Encounter encounter on 09/21/18 MRI BRAIN W WO CONT Narrative INDICATION: AMS / VISUAL DISTURBANCE 
 
COMPARISON: CT 9/21/2018 EXAM: Sagittal T1-weighted FLAIR, and axial T2-weighted FLAIR and T2-weighted 
fast spin-echo, axial diffusion weighted echo planar, axial T1-weighted gradient 
echo, axial susceptibility weighted gradient echo, and post IV contrast-enhanced 
axial T1-weighted spin-echo and coronal T1-weighted gradient echo MR images of 
the brain are obtained. A total of 15 cc intravenous Dotarem was administered 
for the study. FINDINGS: Gyriform increased T1-weighted signal is shown in the right occipital 
lobe with T2 shine through on diffusion weighted images consistent with recent though subacute infarction. A small focus of nonspecific diffusion abnormality 
is shown in the left insular cortex. There is also a nonspecific diffusion 
abnormality in the left superior frontal lobe. Remote left temporal infarction 
is demonstrated. Abundant nonspecific foci of white matter signal alteration or 
shown in the periventricular white matter and centrum semiovale bilaterally with 
additional areas in the left greater than right insular regions bilaterally, 
nonspecific though probably on the basis of chronic small vessel ischemic 
disease of white matter. Mild to moderate prominence of the ventricles and mild 
prominence of the cortical sulci is shown consistent with atrophy. The vascular 
flow voids at the base the brain appear normal in conspicuity sella, optic 
chiasm, orbits and paranasal sinuses appear normal. 
   
 
 Impression IMPRESSION: Evidence for recent infarction involving the cortex of the right 
occipital lobe. Remote left MCA territory infarction and abundant chronic small 
vessel ischemic disease the white matter demonstrated. ASSESSMENT Hospital Problems  Date Reviewed: 9/21/2018 Codes Class Noted POA Cerebrovascular accident (CVA) due to embolism of cerebral artery ([facility] ICD-10-CM: I63.40 ICD-9-CM: 434.11  9/23/2018 Unknown PLAN There is evidence of acute cortical infarction in the right occipital lobe and left frontal lobe and I strongly suspect proximal source i.e. cardioembolic phenomena She has coronary artery disease with multiple stents and echocardiogram shows wall motion abnormalities in several areas Recommend follow-up by cardiology to see if anticoagulation would be indicated for secondary stroke prevention from a cardiac standpoint Continue aspirin +Plavix+ statin for now Repeat CTA to assess for intracranial atherosclerosis Valeria Lord MD

## 2018-09-23 NOTE — PROGRESS NOTES
Spoke with Dr. Twan Manzanares to alert him to pt admitted by hospitalists yesterday.  He will assume care this am.

## 2018-09-23 NOTE — PROGRESS NOTES
Bedside shift change report given to Cathryn (oncoming nurse) by Stephanie Granger (offgoing nurse). Report included the following information SBAR, Kardex and Cardiac Rhythm NSR.

## 2018-09-23 NOTE — PROGRESS NOTES
's Xiomy Quezada, Arthur Casas Admit Date: 9/21/2018 Subjective:  
 
Overall, pt seems to be feeling better. She says her confusion is improved. Brain MRI has not been done yet, and she has not ambulated here yet. No new complaints. Current Facility-Administered Medications Medication Dose Route Frequency  potassium chloride SR (KLOR-CON 10) tablet 20 mEq  20 mEq Oral NOW  acetaminophen (TYLENOL) tablet 650 mg  650 mg Oral Q6H PRN  
 cetirizine (ZYRTEC) tablet 10 mg  10 mg Oral DAILY  cholecalciferol (VITAMIN D3) tablet 1,000 Units  1,000 Units Oral DAILY  clopidogrel (PLAVIX) tablet 75 mg  75 mg Oral DAILY  metoprolol succinate (TOPROL-XL) XL tablet 50 mg  50 mg Oral DAILY  atorvastatin (LIPITOR) tablet 20 mg  20 mg Oral QHS  lisinopril (PRINIVIL, ZESTRIL) tablet 10 mg  10 mg Oral DAILY  allopurinol (ZYLOPRIM) tablet 300 mg  300 mg Oral DAILY  gabapentin (NEURONTIN) capsule 300 mg  300 mg Oral TID  aspirin delayed-release tablet 81 mg  81 mg Oral DAILY  influenza vaccine 2018-19 (6 mos+)(PF) (FLUARIX QUAD/FLULAVAL QUAD) injection 0.5 mL  0.5 mL IntraMUSCular PRIOR TO DISCHARGE  sodium chloride (NS) flush 5-10 mL  5-10 mL IntraVENous Q8H  
 sodium chloride (NS) flush 5-10 mL  5-10 mL IntraVENous PRN Objective:  
 
Patient Vitals for the past 8 hrs: 
 BP Temp Pulse Resp SpO2 Weight  
09/23/18 0700 110/83 98.3 °F (36.8 °C) 67 16 96 % -  
09/23/18 0300 121/54 98.3 °F (36.8 °C) 68 17 96 % 160 lb 6.4 oz (72.8 kg) 09/21 1901 - 09/23 0700 In: 901.7 [I.V.:901.7] Out: - Physical Exam: NAD. A&O. Neck -- Supple. No JVD. Heart -- RRR. Lungs -- CTA. Abd -- Benign. Ext -- No LE edema, b/l. Data Review No results found for this or any previous visit (from the past 24 hour(s)). Assessment:  
 
Principal Problem: 
  Altered mental status -- I agree this appears to be a metabolic phenomenon, but cause of that not clear -- ?possible UTI (mild pyuria on UA). Active Problems: 
  Essential hypertension (5/22/2015) Coronary artery disease involving native coronary artery of native heart without angina pectoris (12/16/2016) Plan: 1. Cont ASA. 2. For MRI brain, as was recommended by neuro. 3. I have ordered urine cx, as I cannot see that one was done. 4. PT. 
 
 
D/w son, Raghav Score -- [phone] -- He and his siblings have been concerned about pt continuing to live alone in her home. ?depression since her 's death 9 months ago -- She might benefit from a SSRI, but will defer to Dr. Jeannine Rboerto. Raghav Score feels it would be best for pt to go back to the [facility] from here, and they can look at [facility] facilities from there. Pt may be able to be transferred tomorrow. Will ask case mgmt to see.  
 
 
 
Shalom Aleman MD

## 2018-09-23 NOTE — PROGRESS NOTES
Problem: Falls - Risk of 
Goal: *Absence of Falls Document Diana Barahona Fall Risk and appropriate interventions in the flowsheet. Outcome: Progressing Towards Goal 
Fall Risk Interventions: 
Mobility Interventions: Communicate number of staff needed for ambulation/transfer, Patient to call before getting OOB, Utilize walker, cane, or other assistive device Medication Interventions: Assess postural VS orthostatic hypotension, Evaluate medications/consider consulting pharmacy, Patient to call before getting OOB, Teach patient to arise slowly, Utilize gait belt for transfers/ambulation Elimination Interventions: Call light in reach, Patient to call for help with toileting needs

## 2018-09-23 NOTE — PROGRESS NOTES
Problem: Mobility Impaired (Adult and Pediatric) Goal: *Acute Goals and Plan of Care (Insert Text) Physical Therapy Goals Initiated 9/23/2018 1. Patient will move from supine to sit and sit to supine  and scoot up and down in bed with independence within 7 day(s). 2.  Patient will transfer from bed to chair and chair to bed with modified independence using the least restrictive device within 7 day(s). 3.  Patient will perform sit to stand with modified independence within 7 day(s). 4.  Patient will ambulate with modified independence for 300 feet with the least restrictive device within 7 day(s). 5.  Patient will ascend/descend 1 stairs with handrail(s) with supervision/set-up within 7 day(s). 6.  Patient will improve Jay Balance score by 7 points within 7 days. physical Therapy EVALUATION- neuro population Patient: Reinaldo Sanchez (7 y.o. female) Date: 9/23/2018 Primary Diagnosis: Altered mental status Precautions:     
 
ASSESSMENT : 
Based on the objective data described below, the patient presents with slightly impaired functional mobility as compared to baseline level 2* decreased R knee ROM (s/p TKA in July 2018), antalgic gait pattern, impaired standing balance, and impaired vision following admission for AMS. MRI + for acute R occipital lobe CVA. Of note, pt has been in SNF rehab since TKA in July - discharged home 4 days prior to this hospitalization. She reports that she has a 4story house, however mostly stays in a basement apartment with 1 step to enter and lives alone. Has SPC and RW however was no longer using them. Denies falls. Pt was cleared for mobility by RN - received up in chair agreeable to participation in session. She reports her vision has improved and describes mostly as difficulty focusing (?impaired convergence). No field cut identified.  She was able to complete sit<>stands from chair and low toilet with CGA for safety - mildly increased postural sway on initial stance. Gait training initiated in hallway w/o AD and CGA to min A - demos increased lateral trunk excursion, antalgic pattern, decreased step lengths, and + LOBs and dizziness with integration of minor head movements for improved environmental scan. Demos good/fair standing balance while managing LB clothing during toileting task. Assisted to bed at end of session per request, NAD. May benefit from use of SPC vs RW for added stability - will trial various AD next session. Anticipate pt will be appropriate for discharge home with increased assistance, home safety eval, and possible OP neuro opthamalagy pending progress. Patient will benefit from skilled intervention to address the above impairments. Patients rehabilitation potential is considered to be Good Factors which may influence rehabilitation potential include:  
[]           None noted 
[]           Mental ability/status []           Medical condition 
[]           Home/family situation and support systems 
[]           Safety awareness 
[]           Pain tolerance/management 
[]           Other: PLAN : 
Recommendations and Planned Interventions: 
[]             Bed Mobility Training             []      Neuromuscular Re-Education []             Transfer Training                   []      Orthotic/Prosthetic Training 
[]             Gait Training                         []      Modalities []             Therapeutic Exercises           []      Edema Management/Control []             Therapeutic Activities            []      Patient and Family Training/Education []             Other (comment): Frequency/Duration: Patient will be followed by physical therapy 5 times a week to address goals. Discharge Recommendations: Home Health home safety eval and increased assist vs OP neuro opthamalagy pending progress Further Equipment Recommendations for Discharge: SUBJECTIVE:  
 Patient stated It has gotten better but I have had trouble with my vision since this surgery.  OBJECTIVE DATA SUMMARY:  
HISTORY:   
Past Medical History:  
Diagnosis Date  Breast cancer ([facility] [address] 1997  
 lumpectomy, RADIATION  
 Cancer [facility]) 2002  
 dcis left breast 2002  Cardiomyopathy ([facility] [address]  Chronic pain  Coronary artery disease involving native coronary artery of native heart without angina pectoris 12/16/2016  
 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  
Procedure Laterality Date  CARDIAC SURG PROCEDURE UNLIST  2015 STENT PLACEMENTS X2  
 HX APPENDECTOMY    
 YRS AGO PT DON`T REMEMBER   
 HX BREAST BIOPSY Left 1980`S  HX BREAST LUMPECTOMY  1988  
 dcis lelf breast  
 HX CATARACT REMOVAL Bilateral 1980`S  HX COLONOSCOPY    
 HX CORONARY STENT PLACEMENT  2015 X6  
 HX GI    
 COLONOSCOPY  
 HX HYSTERECTOMY  1960  HX OTHER SURGICAL    
 ORAL  
 HX OTHER SURGICAL  07/2018 SQUAMOUS CELL CA REMOVED FROM RIGHT FOREARM   
 TOTAL HIP ARTHROPLASTY Left 2018  VASCULAR SURGERY PROCEDURE UNLIST    
 varicose veins Prior Level of Function/Home Situation: lives at home alone - stays mostly in a basement apartment; Mod I with ADLs and mobility w/o AD Personal factors and/or comorbidities impacting plan of care: PMHx Home Situation Home Environment: Private residence # Steps to Enter: 1 One/Two Story Residence:  (4 stories but typically stays in basement apartment) Living Alone: Yes Support Systems: Family member(s) Patient Expects to be Discharged to[de-identified] Private residence Current DME Used/Available at Home: Cane, straight, Walker, rolling EXAMINATION/PRESENTATION/DECISION MAKING:  
 Critical Behavior: 
Neurologic State: Alert Orientation Level: Oriented X4 Cognition: Follows commands Safety/Judgement: Awareness of environment, Insight into deficits Hearing: Auditory Auditory Impairment: None Skin:  Intact where exposed Edema: none noted Range Of Motion: 
AROM: Within functional limits PROM: Generally decreased, functional (recent R knee replacement) Strength:   
Strength: Generally decreased, functional 
  
Tone & Sensation:  
Tone: Normal 
 Sensation: Intact Coordination: 
Coordination: Within functional limits Vision:  
Tracking: Requires cues, head turns, or add eye shifts to track Diplopia: No 
Functional Mobility: 
Bed Mobility: 
Sit to Supine: Supervision Transfers: 
Sit to Stand: Contact guard assistance Stand to Sit: Contact guard assistance Balance:  
Sitting: Intact Standing: Impaired; Without support Standing - Static: Good Standing - Dynamic : Fair Ambulation/Gait Training: 
Distance (ft): 125 Feet (ft) Assistive Device: Gait belt Ambulation - Level of Assistance: Contact guard assistance;Minimal assistance Gait Description (WDL): Exceptions to [facility] Gait Abnormalities: Antalgic;Decreased step clearance;Trunk sway increased Base of Support: Narrowed Speed/Juliana: Shuffled; Slow Step Length: Left shortened;Right shortened Functional Measure Jay Balance Test: 
 
Sitting to Standing: 3 Standing Unsupported: 3 Sitting with Back Unsupported: 4 Standing to Sitting: 3 Transfers: 2 Standing Unsupported with Eyes Closed: 2 Standing Unsupported with Feet Together: 0 Reach Forward with Outstretched Arm: 1  Object: 0 Turn to Look Over Shoulders: 3 Turn 360 Degrees: 0 Alternate Foot on Step/Stool: 0 Standing Unsupported One Foot in Front: 0 Stand on One Le Total: 21 
  
 
 
56=Maximum possible score;  
0-20=High fall risk 21-40=Moderate fall risk 41-56=Low fall risk Jay Balance Test and G-code impairment scale: Percentage of Impairment CH 
 
0% 
 CI 
 
1-19% CJ 
 
20-39% CK 
 
40-59% CL 
 
60-79% CM 
 
80-99% CN  
 
100% Westley Grills Score 0-56 56 45-55 34-44 23-33 12-22 1-11 0  
 
G codes: In compliance with CMSs Claims Based Outcome Reporting, the following G-code set was chosen for this patient based on their primary functional limitation being treated: The outcome measure chosen to determine the severity of the functional limitation was the Westley Grills with a score of 21/56 which was correlated with the impairment scale. ? Mobility - Walking and Moving Around:  
  - CURRENT STATUS: CL - 60%-79% impaired, limited or restricted  - GOAL STATUS: CK - 40%-59% impaired, limited or restricted  - D/C STATUS:  ---------------To be determined--------------- Physical Therapy Evaluation Charge Determination History Examination Presentation Decision-Making HIGH Complexity :3+ comorbidities / personal factors will impact the outcome/ POC  MEDIUM Complexity : 3 Standardized tests and measures addressing body structure, function, activity limitation and / or participation in recreation  LOW Complexity : Stable, uncomplicated  Other outcome measures velasco  HIGH Based on the above components, the patient evaluation is determined to be of the following complexity level: LOW Therapeutic Exercises:  
 
Pain: 
Pain Scale 1: Numeric (0 - 10) Pain Intensity 1: 0 Activity Tolerance: VSS Please refer to the flowsheet for vital signs taken during this treatment. After treatment:  
[]     Patient left in no apparent distress sitting up in chair 
[x]     Patient left in no apparent distress in bed 
[x]     Call bell left within reach [x]     Nursing notified 
[]     Caregiver present 
[]     Bed alarm activated COMMUNICATION/EDUCATION:  
The patients plan of care was discussed with: Registered Nurse.  
 
Patient was educated regarding her deficit(s) of impaired vision as this relates to her diagnosis of CVA. She demonstrated Fair understanding as evidenced by nodding. Patient and/or family was verbally educated on the BE FAST acronym for signs/symptoms of CVA and TIA. BE FAST was written on patient's communication board  for visual education and reinforcement. All questions answered with patient indicating fair understanding. [x]  Fall prevention education was provided and the patient/caregiver indicated understanding. [x]  Patient/family have participated as able in goal setting and plan of care. [x]  Patient/family agree to work toward stated goals and plan of care. []  Patient understands intent and goals of therapy, but is neutral about his/her participation. []  Patient is unable to participate in goal setting and plan of care. Thank you for this referral. 
Brook Chen, PT, DPT Time Calculation: 26 mins

## 2018-09-24 ENCOUNTER — APPOINTMENT (OUTPATIENT)
Dept: CT IMAGING | Age: 83
DRG: 066 | End: 2018-09-24
Attending: INTERNAL MEDICINE
Payer: MEDICARE

## 2018-09-24 PROBLEM — E87.6 HYPOKALEMIA: Status: ACTIVE | Noted: 2018-09-24

## 2018-09-24 LAB
BACTERIA SPEC CULT: NORMAL
CC UR VC: NORMAL
CHOLEST SERPL-MCNC: 124 MG/DL
HDLC SERPL-MCNC: 48 MG/DL
HDLC SERPL: 2.6 {RATIO} (ref 0–5)
LDLC SERPL CALC-MCNC: 60.2 MG/DL (ref 0–100)
LIPID PROFILE,FLP: NORMAL
SERVICE CMNT-IMP: NORMAL
TRIGL SERPL-MCNC: 79 MG/DL (ref ?–150)
VLDLC SERPL CALC-MCNC: 15.8 MG/DL

## 2018-09-24 PROCEDURE — 97165 OT EVAL LOW COMPLEX 30 MIN: CPT

## 2018-09-24 PROCEDURE — 74011000258 HC RX REV CODE- 258: Performed by: INTERNAL MEDICINE

## 2018-09-24 PROCEDURE — 74011250637 HC RX REV CODE- 250/637: Performed by: FAMILY MEDICINE

## 2018-09-24 PROCEDURE — 70498 CT ANGIOGRAPHY NECK: CPT

## 2018-09-24 PROCEDURE — 74011636320 HC RX REV CODE- 636/320: Performed by: INTERNAL MEDICINE

## 2018-09-24 PROCEDURE — 36415 COLL VENOUS BLD VENIPUNCTURE: CPT | Performed by: PSYCHIATRY & NEUROLOGY

## 2018-09-24 PROCEDURE — 74011250637 HC RX REV CODE- 250/637: Performed by: INTERNAL MEDICINE

## 2018-09-24 PROCEDURE — 70496 CT ANGIOGRAPHY HEAD: CPT

## 2018-09-24 PROCEDURE — 74011250637 HC RX REV CODE- 250/637: Performed by: HOSPITALIST

## 2018-09-24 PROCEDURE — 97535 SELF CARE MNGMENT TRAINING: CPT

## 2018-09-24 PROCEDURE — 80061 LIPID PANEL: CPT | Performed by: PSYCHIATRY & NEUROLOGY

## 2018-09-24 PROCEDURE — 65660000000 HC RM CCU STEPDOWN

## 2018-09-24 RX ORDER — POTASSIUM CHLORIDE 750 MG/1
10 TABLET, FILM COATED, EXTENDED RELEASE ORAL
Status: COMPLETED | OUTPATIENT
Start: 2018-09-24 | End: 2018-09-24

## 2018-09-24 RX ORDER — SODIUM CHLORIDE 0.9 % (FLUSH) 0.9 %
10 SYRINGE (ML) INJECTION
Status: COMPLETED | OUTPATIENT
Start: 2018-09-24 | End: 2018-09-24

## 2018-09-24 RX ADMIN — ATORVASTATIN CALCIUM 20 MG: 20 TABLET, FILM COATED ORAL at 21:38

## 2018-09-24 RX ADMIN — GABAPENTIN 300 MG: 300 CAPSULE ORAL at 18:31

## 2018-09-24 RX ADMIN — ASPIRIN 81 MG: 81 TABLET, COATED ORAL at 09:24

## 2018-09-24 RX ADMIN — POTASSIUM CHLORIDE 10 MEQ: 750 TABLET, FILM COATED, EXTENDED RELEASE ORAL at 11:50

## 2018-09-24 RX ADMIN — IOPAMIDOL 100 ML: 755 INJECTION, SOLUTION INTRAVENOUS at 13:14

## 2018-09-24 RX ADMIN — GABAPENTIN 300 MG: 300 CAPSULE ORAL at 09:24

## 2018-09-24 RX ADMIN — SODIUM CHLORIDE 100 ML: 900 INJECTION, SOLUTION INTRAVENOUS at 13:14

## 2018-09-24 RX ADMIN — ALLOPURINOL 300 MG: 300 TABLET ORAL at 09:24

## 2018-09-24 RX ADMIN — LISINOPRIL 10 MG: 10 TABLET ORAL at 09:25

## 2018-09-24 RX ADMIN — METOPROLOL SUCCINATE 50 MG: 50 TABLET, EXTENDED RELEASE ORAL at 09:25

## 2018-09-24 RX ADMIN — ACETAMINOPHEN 650 MG: 325 TABLET ORAL at 21:39

## 2018-09-24 RX ADMIN — Medication 10 ML: at 13:51

## 2018-09-24 RX ADMIN — CLOPIDOGREL BISULFATE 75 MG: 75 TABLET ORAL at 09:24

## 2018-09-24 RX ADMIN — ACETAMINOPHEN 650 MG: 325 TABLET ORAL at 13:51

## 2018-09-24 RX ADMIN — Medication 10 ML: at 13:14

## 2018-09-24 RX ADMIN — VITAMIN D, TAB 1000IU (100/BT) 1000 UNITS: 25 TAB at 09:24

## 2018-09-24 RX ADMIN — CETIRIZINE HYDROCHLORIDE 10 MG: 10 TABLET, FILM COATED ORAL at 09:24

## 2018-09-24 RX ADMIN — GABAPENTIN 300 MG: 300 CAPSULE ORAL at 21:39

## 2018-09-24 RX ADMIN — Medication 10 ML: at 21:38

## 2018-09-24 RX ADMIN — APIXABAN 5 MG: 5 TABLET, FILM COATED ORAL at 21:38

## 2018-09-24 RX ADMIN — Medication 10 ML: at 06:40

## 2018-09-24 NOTE — PROGRESS NOTES
Reason for Admission:   Altered mental status RRAT Score:    23 Resources/supports as identified by patient/family   This pt has adult children who are very supportive. Top Challenges facing patient (as identified by patient/family and CM): Finances/Medication cost?   No    
           
Transportation? Son stated that he transports pt when needed. Support system or lack thereof? See above. Living arrangements? This pt is living alone. Self-care/ADLs/Cognition? Pt was fairly independent prior to admission. Current Advanced Directive/Advance Care Plan:  Not on file Plan for utilizing home health:    TBD Likelihood of readmission: high Transition of Care Plan:        CM went to meet this pt, but she was off the floor at the time. CM called pt's son, Nataliia Ariza ([phone]) to introduce him to the role of CM and transition of care. He verbalized understanding. Apparently this pt just came home from the [facility] about 5 days ago, but did not do well at home. Per son, he would like for this pt to return to the [address] [place] [address], as she has not used all of his Medicare days. Per son, he and his siblings are currently looking at [place] [id number] facilities for this pt when she leaves rehab. He stated that she has funds for this. CM sent a referral to the [address] [place] [address] via [employer]. Saurabh Beth Care Management Interventions PCP Verified by CM: Yes 
Palliative Care Criteria Met (RRAT>21 & CHF Dx)?: No 
Transition of Care Consult (CM Consult): Discharge Planning MyChart Signup: No 
Discharge Durable Medical Equipment: No 
Physical Therapy Consult: Yes Occupational Therapy Consult: Yes Speech Therapy Consult: No 
Current Support Network: Lives Alone Confirm Follow Up Transport: Family Plan discussed with Pt/Family/Caregiver: Yes Freedom of Choice Offered: Yes [employer] Information Provided?: No

## 2018-09-24 NOTE — PROGRESS NOTES
Bedside shift change report given to Ronda (oncoming nurse) by Christina Hanna (offgoing nurse). Report included the following information SBAR, Kardex and Cardiac Rhythm NSR.

## 2018-09-24 NOTE — PROGRESS NOTES
ENA Baeza is a 80 y.o. female who is admitted with symptoms of confusion and questionable hallucinations. She has also reported visual difficulties for the past few months. Clinically she is much better but still has difficulties with word finding and thought processing. MRI brain completed today shows evidence of acute infarction in the right occipital cortex. There is evidence of restricted diffusion in the left frontal lobe cortex as well. Extensive white matter disease. Just completed CTA of the head and neck EXAM 
Exam: 
Visit Vitals  /58 (BP 1 Location: Right arm, BP Patient Position: At rest)  Pulse 72  Temp 98.2 °F (36.8 °C)  Resp 16  
 Ht 5' (1.524 m)  Wt 72.1 kg (159 lb)  SpO2 95%  BMI 31.05 kg/m2 Gen: Alert CV: RRR Lungs: non labored breathing Abd: non distending Neuro: A&O x 3, no dysarthria or aphasia CN II-XII: PERRL, EOMI, face symmetric, tongue/palate midline Motor: strength 5/5 all four ext Sensory: intact to LT Gait: steady LABS/ IMAGING Echocardiogram reviewed with EF of about 30% Lab Results Component Value Date/Time Hemoglobin A1c 6.0 07/19/2018 02:23 PM  
 
MRI Results (most recent): 
 
Results from Hospital Encounter encounter on 09/21/18 MRI BRAIN W WO CONT Narrative INDICATION: AMS / VISUAL DISTURBANCE 
 
COMPARISON: CT 9/21/2018 EXAM: Sagittal T1-weighted FLAIR, and axial T2-weighted FLAIR and T2-weighted 
fast spin-echo, axial diffusion weighted echo planar, axial T1-weighted gradient 
echo, axial susceptibility weighted gradient echo, and post IV contrast-enhanced 
axial T1-weighted spin-echo and coronal T1-weighted gradient echo MR images of 
the brain are obtained. A total of 15 cc intravenous Dotarem was administered 
for the study. FINDINGS: Gyriform increased T1-weighted signal is shown in the right occipital 
lobe with T2 shine through on diffusion weighted images consistent with recent though subacute infarction. A small focus of nonspecific diffusion abnormality 
is shown in the left insular cortex. There is also a nonspecific diffusion 
abnormality in the left superior frontal lobe. Remote left temporal infarction 
is demonstrated. Abundant nonspecific foci of white matter signal alteration or 
shown in the periventricular white matter and centrum semiovale bilaterally with 
additional areas in the left greater than right insular regions bilaterally, 
nonspecific though probably on the basis of chronic small vessel ischemic 
disease of white matter. Mild to moderate prominence of the ventricles and mild 
prominence of the cortical sulci is shown consistent with atrophy. The vascular 
flow voids at the base the brain appear normal in conspicuity sella, optic 
chiasm, orbits and paranasal sinuses appear normal. 
   
 
 Impression IMPRESSION: Evidence for recent infarction involving the cortex of the right 
occipital lobe. Remote left MCA territory infarction and abundant chronic small 
vessel ischemic disease the white matter demonstrated. ASSESSMENT Hospital Problems  Date Reviewed: 9/21/2018 Codes Class Noted POA Hypokalemia ICD-10-CM: E87.6 ICD-9-CM: 276.8  9/24/2018 No  
   
 * (Principal)Cerebrovascular accident (CVA) due to embolism of cerebral artery ([facility] [address] ICD-10-CM: I63.40 ICD-9-CM: 434.11  9/23/2018 Yes Coronary artery disease of native artery with stable angina pectoris [facility]) ICD-10-CM: I25.118 
ICD-9-CM: 414.01, 413.9  12/16/2016 Yes Essential hypertension ICD-10-CM: I10 
ICD-9-CM: 401.9  5/22/2015 Yes CKD (chronic kidney disease) stage 3, GFR 30-59 ml/min ICD-10-CM: N18.3 ICD-9-CM: 585.3  5/22/2015 Yes PLAN High suspicion that the multiple infarcts seen on current MRI brain and evidence of previous infarct is due to underlying cardioembolic phenomena Appreciate cardiology evaluation and agree with the plan to continue aspirin and Eliquis. Also on statin. Follow-up on CT results Initiate discharge planning to rehab Winnie Schumacher MD

## 2018-09-24 NOTE — CONSULTS
Consult    Patient: Jailene Maxwell MRN: [id number]  SSN: [id number]    YOB: 1933  Age: 80 y.o. Sex: female       Subjective:      Date of  Admission: 9/21/2018     Admission type: Emergency     Reason for consult: stroke, possible cardioembolic    Jailene Maxwell is a 80 y.o. female admitted for Altered mental status. Ms Michel Gamino was admitted on 9/21 having been found by her grand-dauighter with acute confusion. She had recently been discharged from rehab after a knee surgery and was back living alone. She was found unresponsive with the phone off the hook and was brought to the hospital. She was also apparently experiencing some visual disturbances including hallucinations recently. She was seen by Neurology and neuroimaging has been done. The Brain MRI showed evidence of a recent right occiptal lobe stroke and a remote left MCA territory stroke. The question has been raised about whether these may be cardioembolic and whether we should change her meds from ASA and plavix to ASA and Eliquis    She does have a history of CAD and per review of [facility] records she sees Dr Audi Galeas. Her most recent coronary intervention was complex multivessel stenting off all three coronary arteries. This was done at [facility] in 2015 and as best as I can tell she has done well since then. She has had a chronic cardiomyopathy with EFs in the 30-40% range, similar to what the current echo shows here. She has had nuclear imaging in the past that has shown evidence of prior MI. As far as I can tell she has never had a.fib.     Primary Care Provider: Denice Mesa MD  Past Medical History:   Diagnosis Date    Breast cancer [facility]) 1997    lumpectomy, RADIATION    Cancer [facility]) 2002    dcis left breast 2002    Cardiomyopathy [facility])     Chronic pain     Coronary artery disease involving native coronary artery of native heart without angina pectoris 12/16/2016    multiple stents    CVA [facility]cerebral vascular accident) ([facility] [address] 09/13/2017    Dyslipidemia     High cholesterol     Hives of unknown origin     Hypertension     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     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    Venous insufficiency 5/17/2011      Past Surgical History:   Procedure Laterality Date    CARDIAC SURG PROCEDURE UNLIST  2015    STENT PLACEMENTS X2    HX APPENDECTOMY      YRS AGO PT DON`T REMEMBER     HX BREAST BIOPSY Left 1980`S    HX BREAST LUMPECTOMY  1988    dcis lelf breast    HX CATARACT REMOVAL Bilateral 1980`S    HX COLONOSCOPY      HX CORONARY STENT PLACEMENT  2015    X6    HX GI      COLONOSCOPY    HX HYSTERECTOMY  1960    HX OTHER SURGICAL      ORAL    HX OTHER SURGICAL  07/2018    SQUAMOUS CELL CA REMOVED FROM RIGHT FOREARM     TOTAL HIP ARTHROPLASTY Left 2018    VASCULAR SURGERY PROCEDURE UNLIST      varicose veins     Family History   Problem Relation Age of Onset    Cancer Sister      breast cancer    Breast Cancer Sister 61    Cancer Mother      PANCREAS     Diabetes Father     Other Father      PVD- AMPUTATION BL LE    Heart Disease Brother     Other Brother      ALS    Heart Failure Son     Heart Disease Son     Hypertension Son     Anesth Problems Neg Hx       Social History   Substance Use Topics    Smoking status: Never Smoker    Smokeless tobacco: Never Used    Alcohol use No      Current Facility-Administered Medications   Medication Dose Route Frequency    sodium chloride 0.9 % bolus infusion 100 mL  100 mL IntraVENous RAD ONCE    iopamidol (ISOVUE-370) 76 % injection 100 mL  100 mL IntraVENous RAD ONCE    sodium chloride (NS) flush 10 mL  10 mL IntraVENous RAD ONCE    potassium chloride SR (KLOR-CON 10) tablet 10 mEq  10 mEq Oral NOW    acetaminophen (TYLENOL) tablet 650 mg  650 mg Oral Q6H PRN    cetirizine (ZYRTEC) tablet 10 mg  10 mg Oral DAILY    cholecalciferol (VITAMIN D3) tablet 1,000 Units  1,000 Units Oral DAILY    clopidogrel (PLAVIX) tablet 75 mg  75 mg Oral DAILY    metoprolol succinate (TOPROL-XL) XL tablet 50 mg  50 mg Oral DAILY    atorvastatin (LIPITOR) tablet 20 mg  20 mg Oral QHS    lisinopril (PRINIVIL, ZESTRIL) tablet 10 mg  10 mg Oral DAILY    allopurinol (ZYLOPRIM) tablet 300 mg  300 mg Oral DAILY    gabapentin (NEURONTIN) capsule 300 mg  300 mg Oral TID    aspirin delayed-release tablet 81 mg  81 mg Oral DAILY    influenza vaccine 2018-19 (6 mos+)(PF) (FLUARIX QUAD/FLULAVAL QUAD) injection 0.5 mL  0.5 mL IntraMUSCular PRIOR TO DISCHARGE    sodium chloride (NS) flush 5-10 mL  5-10 mL IntraVENous Q8H    sodium chloride (NS) flush 5-10 mL  5-10 mL IntraVENous PRN        Allergies   Allergen Reactions    Amoxicillin-Pot Clavulanate Nausea and Vomiting    Cephalexin Nausea and Vomiting    Doxycycline Nausea and Vomiting    Neomycin-Polymyxin-Hc Nausea and Vomiting    Rocephin [Ceftriaxone] Nausea and Vomiting    Sulfamethoxazole-Trimethoprim Nausea and Vomiting        Review of Systems:  A comprehensive review of systems was negative except for that written in the History of Present Illness. Subjective:       Physical Exam:  Visit Vitals    /58 (BP 1 Location: Right arm, BP Patient Position: At rest)    Pulse 72    Temp 98.2 °F (36.8 °C)    Resp 16    Ht 5' (1.524 m)    Wt 72.1 kg (159 lb)    SpO2 95%    BMI 31.05 kg/m2     General Appearance:  Well developed, well nourished,alert and oriented x 3, and individual in no acute distress. Ears/Nose/Mouth/Throat:   Hearing grossly normal.         Neck: Supple. Chest:   Lungs clear to auscultation bilaterally. Cardiovascular:  Regular rate and rhythm, S1, S2 normal, no murmur. Abdomen:   Soft, non-tender, bowel sounds are active. Extremities: No edema bilaterally. Skin: Warm and dry.                Cardiographics:  Telemetry: NSR with occasional PVCs  ECG: NSR with old anterior and inferior MI pattern    Echocardiogram:  9/22/18    SUMMARY:  Left ventricle: Systolic function was moderately reduced. Ejection  fraction was estimated to be 30 %. There was moderate diffuse hypokinesis  with distinct regional wall motion abnormalities. There was moderate  hypokinesis of the mid anteroseptal, apical septal, and apical wall(s). Data Reviewed: BMP: No results found for: NA, K, CL, CO2, AGAP, GLU, BUN, CREA, GFRAA, GFRNA  CBC: No results found for: WBC, HGB, HGBEXT, HCT, HCTEXT, PLT, PLTEXT, HGBEXT, HCTEXT, PLTEXT  All Cardiac Markers in the last 24 hours: No results found for: CPK, CK, CKMMB, CKMB, RCK3, CKMBT, CKNDX, CKND1, LEONEL, TROPT, TROIQ, VICKI, TROPT, TNIPOC, BNP, BNPP    Brain MRI: 9/22/18  FINDINGS: Gyriform increased T1-weighted signal is shown in the right occipital  lobe with T2 shine through on diffusion weighted images consistent with recent  though subacute infarction. A small focus of nonspecific diffusion abnormality  is shown in the left insular cortex. There is also a nonspecific diffusion  abnormality in the left superior frontal lobe. Remote left temporal infarction  is demonstrated. Abundant nonspecific foci of white matter signal alteration or  shown in the periventricular white matter and centrum semiovale bilaterally with  additional areas in the left greater than right insular regions bilaterally,  nonspecific though probably on the basis of chronic small vessel ischemic  disease of white matter. Mild to moderate prominence of the ventricles and mild  prominence of the cortical sulci is shown consistent with atrophy. The vascular  flow voids at the base the brain appear normal in conspicuity sella, optic  chiasm, orbits and paranasal sinuses appear normal.     IMPRESSION  IMPRESSION: Evidence for recent infarction involving the cortex of the right  occipital lobe. Remote left MCA territory infarction and abundant chronic small  vessel ischemic disease the white matter demonstrated.     Carotid duplex 9/22/18:  Mild bilateral ICA plaque        Assessment: 1) Acute stroke, Rt occipital  2) H/o remote left MCA stroke  3) CAD - multivessel, most recent stent procedure 2015, currently stable  4) Chronic cardiomyopathy: EF 30%  5) Old MI  6) HTN  7) Hyperlipidemia, on statin therapy     Plan:     Difficult management question with no clearcut definitive strategy  There is no definite evidence that this is cardioembolic in nature, but I think the presence of a significant cardiomyopathy with wall motion abnormalities, probably related to her underlying CAD and possible prior MIs, along with the pattern of recurrences are concerning for a possible cardioembolic mechanism. Anticoagulation is a reasonable step to take at this time, and I think Eliquis would be a good option for her  I don't think she should be on triple therapy, and since it has been 3 years since her last coronary intervention, I think at this time the best thing is to stop the plavix and just have her take ASA and Eliquis. She should f/u with Dr Audi Galeas about 1 week from discharge.     Signed By: Tiana Molina MD     September 24, 2018

## 2018-09-24 NOTE — PROGRESS NOTES
Occupational Therapy [phone] -  
9.24.2018 Orders acknowledged and chart reviewed in prep for OT evaluation, RN reporting patient in process of being transported [facility] for CTA. Will f/u later in PM as able and appropriate, thank you. Joe Escoto MS, OTR/L

## 2018-09-24 NOTE — CDMP QUERY
Please clarify if this patient is (was) being treated/managed for:  
 
=> Hypokalemia in the setting of diuretic PTA med (bumex) requiring supplemental potassium and lab monitoring  
=> Other explanation of clinical findings 
=> Clinically Undetermined (no explanation for clinical findings) The medical record reflects the following clinical findings, treatment, and risk factors. Risk Factors:  bumex PTA med Clinical Indicators:  Potassium: 3.3 Treatment: supplemental potassium and lab monitoring Please clarify and document your clinical opinion in the progress notes and discharge summary including the definitive and/or presumptive diagnosis, (suspected or probable), related to the above clinical findings. Please include clinical findings supporting your diagnosis. Thank you, Shantell Morales RN 
[facility] 
[phone]

## 2018-09-24 NOTE — PROGRESS NOTES
Problem: Self Care Deficits Care Plan (Adult) Goal: *Acute Goals and Plan of Care (Insert Text) Occupational Therapy Goals Initiated 9/24/2018 1. Patient will perform bathing with modified independence within 7 day(s). 2.  Patient will complete activities reaching above and below waist height with no LOB in prep for ADLs in 7 day(s). 3.  Patient will perform lower body dressing with modified independence within 7 day(s). 4.  Patient will perform toilet transfers with modified independence within 7 day(s). 5.  Patient will perform all aspects of toileting with independence within 7 day(s). 6.  Patient will participate in upper extremity therapeutic exercise/activities with independence for 5 minutes within 7 day(s). 7.  Patient will utilize energy conservation techniques during functional activities with verbal cues within 7 day(s). Occupational Therapy EVALUATION Patient: Mustapha Mantilla (74 y.o. female) Date: 9/24/2018 Primary Diagnosis: Altered mental status Precautions:  Fall ASSESSMENT : 
Based on the objective data described below, the patient presents with overall supervision for bed mobility, CGA for functional mobility, IND-s/u for upper body ADLs and min A-CGA for lower body ADLs s/p admission for AMS. Patient s/p TKA in July 2018 and has hx of CVA. MRI + for acute R occipital lobe CVA. Today, patient ADLs limited by impaired standing balance, mildly decreased functional activity tolerance and impaired vision following admission for AMS. Fugl Rogers Assessment not indicated 2* strength, ROM, coordination and sensation WFL. Patient left in bed at end of session per request, call bell in traci, RN aware. Anticipate patient will be appropriate for D/C home with increased assistance from family, [address] [place] [address] for safety eval, and possible OP neuro opthamalagy pending progress.   
 
Recommend with nursing patient to complete as able in order to maintain strength, endurance and independence: ADLs with supervision/setup, OOB to chair 3x/day and mobilizing to the bathroom for toileting with 1 assist. Thank you for your assistance. Patient will benefit from skilled intervention to address the above impairments. Patients rehabilitation potential is considered to be Good Factors which may influence rehabilitation potential include:  
[]             None noted []             Mental ability/status []             Medical condition []             Home/family situation and support systems []             Safety awareness []             Pain tolerance/management 
[]             Other: PLAN : 
Recommendations and Planned Interventions: 
[x]               Self Care Training                  [x]        Therapeutic Activities [x]               Functional Mobility Training    []        Cognitive Retraining 
[x]               Therapeutic Exercises           [x]        Endurance Activities [x]               Balance Training                   []        Neuromuscular Re-Education []               Visual/Perceptual Training     [x]   Home Safety Training 
[x]               Patient Education                 [x]        Family Training/Education []               Other (comment): Frequency/Duration: Patient will be followed by occupational therapy 3 times a week to address goals. Discharge Recommendations: Home Health OT for home safety and increased assist from family Further Equipment Recommendations for Discharge: TBD - likely none SUBJECTIVE:  
Patient stated My vision has been worse since my surgery.  OBJECTIVE DATA SUMMARY:  
HISTORY:  
Past Medical History:  
Diagnosis Date  Breast cancer ([facility] [address] 1997  
 lumpectomy, RADIATION  
 Cancer [facility]) 2002  
 dcis left breast 2002  Cardiomyopathy ([facility] [address]  Chronic pain  Coronary artery disease involving native coronary artery of native heart without angina pectoris 12/16/2016  
 multiple stents  CVA (cerebral vascular accident) (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  
Procedure Laterality Date  CARDIAC SURG PROCEDURE UNLIST  2015 STENT PLACEMENTS X2  
 HX APPENDECTOMY    
 YRS AGO PT DON`T REMEMBER   
 HX BREAST BIOPSY Left 1980`S  HX BREAST LUMPECTOMY  1988  
 dcis lelf breast  
 HX CATARACT REMOVAL Bilateral 1980`S  HX COLONOSCOPY    
 HX CORONARY STENT PLACEMENT  2015 X6  
 HX GI    
 COLONOSCOPY  
 HX HYSTERECTOMY  1960  HX OTHER SURGICAL    
 ORAL  
 HX OTHER SURGICAL  07/2018 SQUAMOUS CELL CA REMOVED FROM RIGHT FOREARM   
 TOTAL HIP ARTHROPLASTY Left 2018  VASCULAR SURGERY PROCEDURE UNLIST    
 varicose veins Prior Level of Function/Environment/Context: Patient has been in SNF rehab since TKA in July - discharged home 4 days prior to this hospitalization. She reports that she has a 4story house, however mostly stays in a basement apartment with 1 step to enter and lives alone. Paitent was mod I with self care, IADLs and functional mobility PTA. Patient has all needed DME for ADLs and has SPC and RW however was no longer using them. Denies falls. Home Situation Home Environment: Private residence # Steps to Enter: 1 One/Two Story Residence: Other (Comment) (4 level home, lives in basement apartment) Living Alone: Yes Support Systems: Family member(s) Patient Expects to be Discharged to[de-identified] Private residence Current DME Used/Available at Home: Adaptive dressing aides, [address] [facility], Ne, quad, Cane, straight, Grab bars, Raised toilet seat, Shower chair, Walker, rollator Tub or Shower Type: Shower Hand dominance: Right EXAMINATION OF PERFORMANCE DEFICITS: 
Cognitive/Behavioral Status: 
Neurologic State: Alert Orientation Level: Oriented X4 
 Cognition: Appropriate for age attention/concentration; Follows commands Perception: Appears intact Perseveration: No perseveration noted Safety/Judgement: Awareness of environment; Fall prevention Skin: Appears grossly intact Edema: none noted in BUEs Hearing: Auditory Auditory Impairment: None Vision/Perceptual:   
Tracking: Requires cues, head turns, or add eye shifts to track Diplopia: No   
Acuity: Able to read clock/calendar on wall without difficulty; Able to read employee name badge without difficulty Corrective Lenses: Reading glasses Range of Motion: In [address] [place] [address] [address] AROM: Within functional limits Strength: In [address] [place] [address]  equal 5/5 Strength: Generally decreased, functional 
 
Coordination: 
Coordination: Within functional limits Fine Motor Skills-Upper: Left Intact; Right Intact Gross Motor Skills-Upper: Left Intact; Right Intact Tone & Sensation: In [address] [place] [address] Tone: Normal 
Sensation: Intact Balance: 
Sitting: Intact Standing: Impaired; Without support Standing - Static: Good Standing - Dynamic : Fair Functional Mobility and Transfers for ADLs: 
Bed Mobility: 
Sit to Supine: Supervision Scooting: Supervision Transfers: 
Sit to Stand: Contact guard assistance Stand to Sit: Setup Bed to Chair: Contact guard assistance Toilet Transfer : Contact guard assistance ADL Assessment: 
Feeding: Independent Oral Facial Hygiene/Grooming: Setup* Bathing: Minimum assistance* Upper Body Dressing: Setup* Lower Body Dressing: Minimum assistance Toileting: Contact guard assistance *Infer per obs of functional mobility, functional reach, BUE ROM, strength, balance and cognition ADL Intervention and task modifications: 
 
Lower Body Dressing Assistance Socks: Minimum assistance Leg Crossed Method Used: No 
Position Performed: Seated in chair Cues: Don;Physical assistance Toileting Bladder Hygiene: Stand-by assistance Bowel Hygiene: Stand-by assistance Clothing Management: Minimum assistance Cues: Physical assistance for pants up Cognitive Retraining Safety/Judgement: Awareness of environment; Fall prevention Functional Measure: 
Barthel Index: 
 
Bathin Bladder: 10 Bowels: 10 
Groomin Dressin Feeding: 10 Mobility: 10 Stairs: 0 Toilet Use: 10 Transfer (Bed to Chair and Back): 10 Total: 70 Barthel and G-code impairment scale: 
Percentage of impairment CH 
0% CI 
1-19% CJ 
20-39% CK 
40-59% CL 
60-79% CM 
80-99% CN 
100% Barthel Score 0-100 100 99-80 79-60 59-40 20-39 1-19 
 0 Barthel Score 0-20 20 17-19 13-16 9-12 5-8 1-4 0 The Barthel ADL Index: Guidelines 1. The index should be used as a record of what a patient does, not as a record of what a patient could do. 2. The main aim is to establish degree of independence from any help, physical or verbal, however minor and for whatever reason. 3. The need for supervision renders the patient not independent. 4. A patient's performance should be established using the best available evidence. Asking the patient, friends/relatives and nurses are the usual sources, but direct observation and common sense are also important. However direct testing is not needed. 5. Usually the patient's performance over the preceding 24-48 hours is important, but occasionally longer periods will be relevant. 6. Middle categories imply that the patient supplies over 50 per cent of the effort. 7. Use of aids to be independent is allowed. Donna Chaudhry., Barthel, D.W. (1891). Functional evaluation: the Barthel Index. [address] [facility] (14)2. Francesca Ban alphonso [place], J.J.M.F, Kj Mccloud, Nael PuenteHCA Florida Su[facility] Emergency, [facility] (). Measuring the change indisability after inpatient rehabilitation; comparison of the responsiveness of the Barthel Index and Functional [place] Measure. Journal of Neurology, Neurosurgery, and Psychiatry, 66(4), 982-672. LES Lopez, OSCAR Greer, & Nayeli Woo M.A. (2004.) Assessment of post-stroke quality of life in cost-effectiveness studies: The usefulness of the Barthel Index and the EuroQoL-5D. [facility], 13, 113-18 G codes: In compliance with CMSs Claims Based Outcome Reporting, the following G-code set was chosen for this patient based on their primary functional limitation being treated: The outcome measure chosen to determine the severity of the functional limitation was the Barthel Index with a score of 70/100 which was correlated with the impairment scale. ? Self Care:  
  - CURRENT STATUS: CJ - 20%-39% impaired, limited or restricted  - GOAL STATUS: CI - 1%-19% impaired, limited or restricted  - D/C STATUS:  ---------------To be determined--------------- Occupational Therapy Evaluation Charge Determination History Examination Decision-Making LOW Complexity : Brief history review  LOW Complexity : 1-3 performance deficits relating to physical, cognitive , or psychosocial skils that result in activity limitations and / or participation restrictions  MEDIUM Complexity : Patient may present with comorbidities that affect occupational performnce. Miniml to moderate modification of tasks or assistance (eg, physical or verbal ) with assesment(s) is necessary to enable patient to complete evaluation Based on the above components, the patient evaluation is determined to be of the following complexity level: LOW Pain: 
Pain Scale 1: Numeric (0 - 10) Pain Intensity 1: 0 Activity Tolerance:  
Good, VSS Please refer to the flowsheet for vital signs taken during this treatment. After treatment:  
[] Patient left in no apparent distress sitting up in chair 
[x] Patient left in no apparent distress in bed 
[x] Call bell left within reach [x] Nursing notified 
[] Caregiver present 
[] Bed alarm activated COMMUNICATION/EDUCATION:  
 The patients plan of care was discussed with: Physical Therapist and Registered Nurse. [x] Home safety education was provided and the patient/caregiver indicated understanding. [x] Patient/family have participated as able in goal setting and plan of care. [] Patient/family agree to work toward stated goals and plan of care. [] Patient understands intent and goals of therapy, but is neutral about his/her participation. [] Patient is unable to participate in goal setting and plan of care. This patients plan of care is appropriate for delegation to [facility]. Thank you for this referral. 
Tanmay Braun OT Time Calculation: 31 mins

## 2018-09-24 NOTE — PROGRESS NOTES
Problem: Pressure Injury - Risk of 
Goal: *Prevention of pressure injury Document Esau Scale and appropriate interventions in the flowsheet. Outcome: Progressing Towards Goal 
Pressure Injury Interventions: Activity Interventions: Increase time out of bed, Pressure redistribution bed/mattress(bed type), PT/OT evaluation Mobility Interventions: Float heels, Pressure redistribution bed/mattress (bed type), PT/OT evaluation Nutrition Interventions: Document food/fluid/supplement intake Friction and Shear Interventions: Lift sheet, Lift team/patient mobility team, Minimize layers Problem: Falls - Risk of 
Goal: *Absence of Falls Document Shaista Carias Fall Risk and appropriate interventions in the flowsheet. Outcome: Progressing Towards Goal 
Fall Risk Interventions: 
Mobility Interventions: PT Consult for mobility concerns, PT Consult for assist device competence Medication Interventions: Bed/chair exit alarm, Teach patient to arise slowly, Patient to call before getting OOB Elimination Interventions: Bed/chair exit alarm, Call light in reach, Toileting schedule/hourly rounds

## 2018-09-24 NOTE — PROGRESS NOTES
General Daily Progress Note Plan:  
Cardiology consult with Alexis Bound partner CTA head and neck pending Continue to mobilize Discussed with family - rehab at [facility] Replete K Assessment:  
Continues to improve - She had 6 stents about four years ago and maintained on plavix/asa since with occ angina promptly relieved by NTG - not known to have a fib; best alternative may be to change to eliquis/asa Principal Problem: 
  Cerebrovascular accident (CVA) due to embolism of cerebral artery ([facility] [address] (9/23/2018) Active Problems: 
  Coronary artery disease of native artery with stable angina pectoris ([address] (12/16/2016) CKD (chronic kidney disease) stage 3, GFR 30-59 ml/min (5/22/2015) Essential hypertension (5/22/2015) Hypokalemia (9/24/2018) 9/24/2018 Admit Date: 9/21/2018 Subjective:  
Alert fluent speech good appetite some stable blurred vision She lives alone; happy with rehab at the [facility] Objective:  
 
Patient Vitals for the past 8 hrs: 
 BP Temp Pulse Resp SpO2  
09/24/18 0925 117/64 - 68 - -  
09/24/18 0800 - - - - 95 % 09/24/18 0700 118/56 97.7 °F (36.5 °C) (!) 58 15 94 % Physical Exam:neuro - motor intact Lungs -clear Heart -reg Abdomen- 
Extremities-r knee post op swell TKR Data Review Recent Results (from the past 24 hour(s)) LIPID PANEL Collection Time: 09/24/18  3:34 AM  
Result Value Ref Range LIPID PROFILE Cholesterol, total 124 <200 MG/DL Triglyceride 79 <150 MG/DL  
 HDL Cholesterol 48 MG/DL  
 LDL, calculated 60.2 0 - 100 MG/DL VLDL, calculated 15.8 MG/DL  
 CHOL/HDL Ratio 2.6 0 - 5.0    
 
 
CULTURES: 
 
No results found for: SDES Lab Results Component Value Date/Time  Culture result: MRSA NOT PRESENT 07/19/2018 02:05 PM  
 Culture result:  07/19/2018 02:05 PM  
      Screening of patient nares for MRSA is for surveillance purposes and, if positive, to facilitate isolation considerations in high risk settings. It is not intended for automatic decolonization interventions per se as regimens are not sufficiently effective to warrant routine use. Crist Dubin General Daily Progress Note Plan:  
 
 
 
Assessment:  
 
Principal Problem: 
  Cerebrovascular accident (CVA) due to embolism of cerebral artery ([facility] [address] (9/23/2018) Active Problems: 
  Coronary artery disease of native artery with stable angina pectoris ([facility] [address] (12/16/2016) CKD (chronic kidney disease) stage 3, GFR 30-59 ml/min (5/22/2015) Essential hypertension (5/22/2015) Hypokalemia (9/24/2018) 9/24/2018 Admit Date: 9/21/2018 Subjective:  
 
 
 
Objective:  
 
Patient Vitals for the past 8 hrs: 
 BP Temp Pulse Resp SpO2  
09/24/18 0925 117/64 - 68 - -  
09/24/18 0800 - - - - 95 % 09/24/18 0700 118/56 97.7 °F (36.5 °C) (!) 58 15 94 % Physical Exam: 
Lungs - Heart - Abdomen- 
Extremities- 
 
Data Review Recent Results (from the past 24 hour(s)) LIPID PANEL Collection Time: 09/24/18  3:34 AM  
Result Value Ref Range LIPID PROFILE Cholesterol, total 124 <200 MG/DL Triglyceride 79 <150 MG/DL  
 HDL Cholesterol 48 MG/DL  
 LDL, calculated 60.2 0 - 100 MG/DL VLDL, calculated 15.8 MG/DL  
 CHOL/HDL Ratio 2.6 0 - 5.0    
 
 
CULTURES: 
 
No results found for: SDES Lab Results Component Value Date/Time Culture result: MRSA NOT PRESENT 07/19/2018 02:05 PM  
 Culture result:  07/19/2018 02:05 PM  
      Screening of patient nares for MRSA is for surveillance purposes and, if positive, to facilitate isolation considerations in high risk settings. It is not intended for automatic decolonization interventions per se as regimens are not sufficiently effective to warrant routine use. Crist Dubin

## 2018-09-24 NOTE — PROGRESS NOTES
Bedside and Verbal shift change report given to ANGELINA Simon (oncoming nurse) by Juan Antonio Gustfason RN (offgoing nurse). Report included the following information SBAR, Kardex, Procedure Summary, Intake/Output, MAR, Recent Results and Cardiac Rhythm NSR.

## 2018-09-25 ENCOUNTER — DOCUMENTATION ONLY (OUTPATIENT)
Dept: CASE MANAGEMENT | Age: 83
End: 2018-09-25

## 2018-09-25 VITALS
HEART RATE: 92 BPM | RESPIRATION RATE: 14 BRPM | WEIGHT: 159.61 LBS | OXYGEN SATURATION: 96 % | BODY MASS INDEX: 31.34 KG/M2 | SYSTOLIC BLOOD PRESSURE: 105 MMHG | DIASTOLIC BLOOD PRESSURE: 72 MMHG | TEMPERATURE: 98.7 F | HEIGHT: 60 IN

## 2018-09-25 PROBLEM — E87.6 HYPOKALEMIA: Status: RESOLVED | Noted: 2018-09-24 | Resolved: 2018-09-25

## 2018-09-25 PROCEDURE — 90686 IIV4 VACC NO PRSV 0.5 ML IM: CPT | Performed by: HOSPITALIST

## 2018-09-25 PROCEDURE — 74011250637 HC RX REV CODE- 250/637: Performed by: INTERNAL MEDICINE

## 2018-09-25 PROCEDURE — 74011250636 HC RX REV CODE- 250/636: Performed by: HOSPITALIST

## 2018-09-25 PROCEDURE — 74011250637 HC RX REV CODE- 250/637: Performed by: FAMILY MEDICINE

## 2018-09-25 PROCEDURE — 74011250637 HC RX REV CODE- 250/637: Performed by: HOSPITALIST

## 2018-09-25 PROCEDURE — 90471 IMMUNIZATION ADMIN: CPT

## 2018-09-25 RX ADMIN — Medication 10 ML: at 06:22

## 2018-09-25 RX ADMIN — LISINOPRIL 10 MG: 10 TABLET ORAL at 08:58

## 2018-09-25 RX ADMIN — ASPIRIN 81 MG: 81 TABLET, COATED ORAL at 08:57

## 2018-09-25 RX ADMIN — ACETAMINOPHEN 650 MG: 325 TABLET ORAL at 12:06

## 2018-09-25 RX ADMIN — ALLOPURINOL 300 MG: 300 TABLET ORAL at 08:57

## 2018-09-25 RX ADMIN — VITAMIN D, TAB 1000IU (100/BT) 1000 UNITS: 25 TAB at 08:57

## 2018-09-25 RX ADMIN — INFLUENZA VIRUS VACCINE 0.5 ML: 15; 15; 15; 15 SUSPENSION INTRAMUSCULAR at 12:06

## 2018-09-25 RX ADMIN — CETIRIZINE HYDROCHLORIDE 10 MG: 10 TABLET, FILM COATED ORAL at 08:57

## 2018-09-25 RX ADMIN — APIXABAN 5 MG: 5 TABLET, FILM COATED ORAL at 08:57

## 2018-09-25 RX ADMIN — GABAPENTIN 300 MG: 300 CAPSULE ORAL at 08:57

## 2018-09-25 RX ADMIN — ACETAMINOPHEN 650 MG: 325 TABLET ORAL at 06:22

## 2018-09-25 RX ADMIN — METOPROLOL SUCCINATE 50 MG: 50 TABLET, EXTENDED RELEASE ORAL at 08:57

## 2018-09-25 NOTE — DISCHARGE SUMMARY
[facility]     Physician Discharge Summary     Patient ID:  Flori Santana  [id number]  50 y.o.  11/21/1933    Admit date: 9/21/2018  Discharge date: 9/25/2018    Discharge Diagnoses:  Principal Problem:    Cerebrovascular accident (CVA) due to embolism of cerebral artery ([facility] [address] (9/23/2018)    Active Problems:    Coronary artery disease of native artery with stable angina pectoris ([address] (12/16/2016)      Overview:             EF 30%      CKD (chronic kidney disease) stage 3, GFR 30-59 ml/min (5/22/2015)      Essential hypertension (5/22/2015)        Past Medical History:    Past Medical History:   Diagnosis Date    Breast cancer ([facility] [address] 1997    lumpectomy, RADIATION    Cancer ([facility] [address] 2002    dcis left breast 2002    Cardiomyopathy [facility])     Chronic pain     Coronary artery disease involving native coronary artery of native heart without angina pectoris 12/16/2016    multiple stents    CVA (cerebral vascular accident) ([facility] [address] 09/13/2017    Dyslipidemia     High cholesterol     Hives of unknown origin     Hypertension     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 SHE REMAINED IN RSR. SHE WAS CHANGED FROM PLAVIX TO ELIQUIS AND CONTINUED ON BABY ASA WITH STATIN. VISION HAD IMPROVED ON DISCHARGE WITH CLEARING MENTATION AND SHE WAS AMBULATING INDEPENDENTLY.     CTA HEAD AND NECK REPORT PENDING ON DISCHARGE    Code status FULL  Disposition:SNF AT THE [facility] - IMPROVED    Follow up as per in patient DC instructions    Patient Instructions:   Current Discharge Medication List      START taking these medications    Details   apixaban (ELIQUIS) 5 mg tablet Take 1 Tab by mouth two (2) times a day. Qty: 60 Tab, Refills: 11         CONTINUE these medications which have NOT CHANGED    Details   traMADol (ULTRAM) 50 mg tablet Take 50 mg by mouth two (2) times a day. acetaminophen (TYLENOL) 500 mg tablet Take 500 mg by mouth as needed for Pain. cholecalciferol, vitamin D3, (VITAMIN D3 PO) Take 1 Tab by mouth daily. Unsure of dose      cetirizine (ZYRTEC) 10 mg tablet Take 10 mg by mouth daily. multivitamin (ONE A DAY) tablet Take 1 Tab by mouth daily. metoprolol succinate (TOPROL-XL) 50 mg XL tablet TAKE 1&1/2 TABLETS BY MOUTH EVERY HS  Refills: 3      aspirin delayed-release 81 mg tablet Take 81 mg by mouth daily. gabapentin (NEURONTIN) 300 mg capsule TAKE 1 CAPSULE BY MOUTH THREE TIMES A DAY  Qty: 270 Cap, Refills: 4      allopurinol (ZYLOPRIM) 300 mg tablet Take 1/2 tablet by mouth daily. For tophaceous gout  Qty: 45 Tab, Refills: 4      atorvastatin (LIPITOR) 20 mg tablet Take 20 mg by mouth nightly. lisinopril (PRINIVIL, ZESTRIL) 10 mg tablet Take 10 mg by mouth daily. nitroglycerin (NITROSTAT) 0.4 mg SL tablet every five (5) minutes as needed.          STOP taking these medications       bumetanide (BUMEX) 2 mg tablet Comments:   Reason for Stopping:         clopidogrel (PLAVIX) 75 mg tab Comments:   Reason for Stopping:         triamcinolone acetonide (KENALOG) 0.025 % topical cream Comments:   Reason for Stopping:                 Signed:  Cris Ricci MD  9/25/2018  6:47 AM

## 2018-09-25 NOTE — PROGRESS NOTES
SVETA spoke with Julisa with the [facility]  and she can accept this pt today, as she has been discharged. CM met with this pt and her granddaughter to inform them and they are in agreement with this plan. The grand-daughter or pt's son will transport her to the facility. Pt's kardex, MARs and AVS will be sent with this pt to the [address] [place] [address]. Also, pt's nurse will report. Lucy Pugh

## 2018-09-25 NOTE — DISCHARGE INSTRUCTIONS
Patient Discharge Instructions    Henrietta Espinosa / [id number] : 1933    Admitted 2018 Discharged: 2018                 · It is important that you take the medication exactly as they are prescribed. · Keep your medication in the bottles provided by the pharmacist and keep a list of the medication names, dosages, and times to be taken in your wallet. Recommended diet:NO ADDED SALT    Recommended activity: SKILLED PT/OT      Follow-up with [address] [place] [address]. obtained by :  I understand that if any problems occur once I am at home I am to contact my physician. I understand and acknowledge receipt of the instructions indicated above.                                                                                                                                            Physician's or R.N.'s Signature                                                                  Date/Time                                                                                                                                              Patient or Representative Signature                                                          Date/Time

## 2018-09-25 NOTE — PROGRESS NOTES
I have reviewed discharge instructions with the patient and caregiver. The patient and caregiver verbalized understanding. PIV and telemetry discontinued. Pt discharged to [address] [place] [address] via a family member. Report called in to. .......................................... Leroy Layne

## 2018-09-25 NOTE — PROGRESS NOTES
General Daily Progress Note Plan:  
Ready to go to rehab at the [address] [facility] today F/S SUMMER Levine one week Assessment:  
Ambulating independently Report CTA head and neck pending Urine culture mixed urogenital vinny Principal Problem: 
  Cerebrovascular accident (CVA) due to embolism of cerebral artery ([facility] [address] (9/23/2018) Active Problems: 
  Coronary artery disease of native artery with stable angina pectoris ([facility] [address] (12/16/2016) Overview:  
     
    EF 30% CKD (chronic kidney disease) stage 3, GFR 30-59 ml/min (5/22/2015) Essential hypertension (5/22/2015) 9/25/2018 Admit Date: 9/21/2018 Subjective: Happy to go to the [facility]; no gu complaints RECONFIRMS DESIRE FOR FULL CPR Objective:  
 
Patient Vitals for the past 8 hrs: 
 BP Temp Pulse Resp SpO2 Weight  
09/25/18 0300 114/60 98.4 °F (36.9 °C) 66 18 96 % 159 lb 9.8 oz (72.4 kg) 09/24/18 2300 108/52 98.4 °F (36.9 °C) 77 17 95 % - Physical Exam: 
Lungs - Heart - Abdomen- 
Extremities- 
 
Data Review No results found for this or any previous visit (from the past 24 hour(s)). CULTURES: 
 
No results found for: SDES Lab Results Component Value Date/Time Culture result: MIXED UROGENITAL VINNY ISOLATED 09/23/2018 08:45 AM  
 Culture result: MRSA NOT PRESENT 07/19/2018 02:05 PM  
 Culture result:  07/19/2018 02:05 PM  
      Screening of patient nares for MRSA is for surveillance purposes and, if positive, to facilitate isolation considerations in high risk settings. It is not intended for automatic decolonization interventions per se as regimens are not sufficiently effective to warrant routine use. Melissa Soler

## 2018-09-25 NOTE — PROGRESS NOTES
EMIL NOTE:  The objective of todays visit was to review the transitional care plan with the patient and family. We discussed the importance of transitional care as it relates to reducing the likelihood of readmission. We reviewed the goals for the first 30 days following hospital discharge and discharge from SNF. The patient and I discussed wrap around services including nurse navigation, care coordination, home health, transitional care appointments with their primary care provider and specialist team and the role of dispatch health. Patient and family  education focused on readmission zones as described as  The Red Zone: High risk for readmission, days 1-21  The Yellow Zone: Moderate risk for readmission, days 22-29  The Green Zone: Lower risk for readmission, days 30 and after. Patient stated that her   recently and she is feeling lost and depressed. NN  Request that patient please inform her physicians about her grief. Patient 's granddaughter stated that she will inform patient providers as well. Patient's son Susannah Muhammad stated that the family will assist patient with admission to [facility] after discharge from SNF/rehabilitation. Patient discharged to [address] [place] [address]. Patient was given Advance Directive information but is not interested in completing the document today. She stated that Dr. Glenn Johnson discussed Advance Care planning at length with her today. The patient and family were instructed on worrisome symptoms for worsening of their medical conditions and sepsis. Learning was assessed using teach back in the form of role playing. The patient identified appropriate wrap around services during this interaction. An ambulatory NN will contact patient and family.   Thank you

## 2018-10-31 ENCOUNTER — TELEPHONE (OUTPATIENT)
Dept: NEUROLOGY | Age: 83
End: 2018-10-31

## 2018-10-31 NOTE — TELEPHONE ENCOUNTER
Pt's granddaughter states pt is having mini strokes and needs to get her in right away    Please call back

## 2018-10-31 NOTE — TELEPHONE ENCOUNTER
Spoke with granddaughter, informed that if she felt like the patient was having Mini strokes, she needed to go to the ER. Betty verbalized understanding.

## 2018-11-16 ENCOUNTER — OFFICE VISIT (OUTPATIENT)
Dept: NEUROLOGY | Age: 83
End: 2018-11-16

## 2018-11-16 VITALS
OXYGEN SATURATION: 90 % | HEART RATE: 105 BPM | HEIGHT: 60 IN | RESPIRATION RATE: 14 BRPM | SYSTOLIC BLOOD PRESSURE: 140 MMHG | DIASTOLIC BLOOD PRESSURE: 80 MMHG | BODY MASS INDEX: 31.41 KG/M2 | WEIGHT: 160 LBS

## 2018-11-16 DIAGNOSIS — R25.1 TREMOR OF LEFT HAND: ICD-10-CM

## 2018-11-16 DIAGNOSIS — I63.40 CEREBROVASCULAR ACCIDENT (CVA) DUE TO EMBOLISM OF CEREBRAL ARTERY (HCC): Primary | ICD-10-CM

## 2018-11-16 RX ORDER — GABAPENTIN 300 MG/1
CAPSULE ORAL
Qty: 30 CAP | Refills: 0 | Status: SHIPPED | OUTPATIENT
Start: 2018-11-16 | End: 2019-06-03

## 2018-11-16 NOTE — PROGRESS NOTES
Patient is here for hospital follow up Vision issues, trouble with short term memory and finding words

## 2018-11-16 NOTE — PROGRESS NOTES
Chief Complaint Patient presents with  Neurologic Problem HISTORY OF PRESENT ILLNESS Carmen Del Toro is a 80 y.o. female who was last seen by me in September 2018 when she was admitted with symptoms of confusion and questionable hallucinations. She has also reported visual difficulties for the past few months. She had difficulties with word finding and thought processing. MRI brain showed acute infarction in the right occipital cortex. There is evidence of restricted diffusion in the left frontal lobe cortex as well. Extensive white matter disease. CTA of the head and neck was negative. Cardioembolism was suspected and given her history of MI, congestive heart failure with reduced ejection fraction, she was started on aspirin and Eliquis as per recommendations from cardiology. She has been doing well. Was in rehab and is planning to go to [facility] assisted living facility. She saw ophthalmology and tells me that her eye exam was negative. Denies any residual visual field deficits. Still does not drive. She has been noticing some intentional tremor in her left hand that comes and goes She is on gabapentin primarily for pain and is wondering if she could come off She has also been complaining of short-term memory issues and forgetfulness but remains independent with activities of daily living Past Medical History:  
Diagnosis Date  Breast cancer ([address] 1997  
 lumpectomy, RADIATION  
 Cancer [facility]) 2002  
 dcis left breast 2002  Cardiomyopathy ([address]  Chronic pain  Coronary artery disease involving native coronary artery of native heart without angina pectoris 12/16/2016  
 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  
 gabapentin (NEURONTIN) 300 mg capsule TAKE 1 CAP TID X 3 days, then BID X3 days, then daily x 3 days, then stop  apixaban (ELIQUIS) 5 mg tablet Take 1 Tab by mouth two (2) times a day.  traMADol (ULTRAM) 50 mg tablet Take 50 mg by mouth two (2) times a day.  acetaminophen (TYLENOL) 500 mg tablet Take 500 mg by mouth as needed for Pain.  cholecalciferol, vitamin D3, (VITAMIN D3 PO) Take 1 Tab by mouth daily. Unsure of dose  cetirizine (ZYRTEC) 10 mg tablet Take 10 mg by mouth daily.  multivitamin (ONE A DAY) tablet Take 1 Tab by mouth daily.  metoprolol succinate (TOPROL-XL) 50 mg XL tablet TAKE 1&1/2 TABLETS BY MOUTH EVERY HS  
 nitroglycerin (NITROSTAT) 0.4 mg SL tablet every five (5) minutes as needed.  aspirin delayed-release 81 mg tablet Take 81 mg by mouth daily.  allopurinol (ZYLOPRIM) 300 mg tablet Take 1/2 tablet by mouth daily. For tophaceous gout  atorvastatin (LIPITOR) 20 mg tablet Take 20 mg by mouth nightly.  lisinopril (PRINIVIL, ZESTRIL) 10 mg tablet Take 10 mg by mouth daily. No current facility-administered medications for this visit. Allergies Allergen Reactions  Amoxicillin-Pot Clavulanate Nausea and Vomiting  Cephalexin Nausea and Vomiting  Doxycycline Nausea and Vomiting  Neomycin-Polymyxin-Hc Nausea and Vomiting  Rocephin [Ceftriaxone] Nausea and Vomiting  Sulfamethoxazole-Trimethoprim Nausea and Vomiting Family History Problem Relation Age of Onset  Cancer Sister   
     breast cancer  Breast Cancer Sister 61  Cancer Mother PANCREAS   
 Diabetes Father  Other Father PVD- AMPUTATION BL LE  
 Heart Disease Brother  Other Brother ALS  
 Heart Failure Son   
 Heart Disease Son   
[facility] Hypertension Son  Anesth Problems Neg Hx Social History Tobacco Use  Smoking status: Never Smoker  Smokeless tobacco: Never Used Substance Use Topics  Alcohol use: No  
 Drug use: No  
 
Past Surgical History:  
Procedure Laterality Date  CARDIAC SURG PROCEDURE UNLIST  2015 STENT PLACEMENTS X2  
 HX APPENDECTOMY    
 YRS AGO PT DON`T REMEMBER   
 HX BREAST BIOPSY Left 1980`S  HX BREAST LUMPECTOMY  1988  
 dcis lelf breast  
 HX CATARACT REMOVAL Bilateral 1980`S  HX COLONOSCOPY    
 HX CORONARY STENT PLACEMENT  2015 X6  
 HX GI    
 COLONOSCOPY  
 HX HYSTERECTOMY  1960  HX OTHER SURGICAL    
 ORAL  
 HX OTHER SURGICAL  07/2018 SQUAMOUS CELL CA REMOVED FROM RIGHT FOREARM   
 TOTAL HIP ARTHROPLASTY Left 2018  VASCULAR SURGERY PROCEDURE UNLIST    
 varicose veins PHYSICAL EXAMINATION:   
Visit Vitals /80 Pulse (!) 105 Resp 14 Ht 5' (1.524 m) Wt 72.6 kg (160 lb) SpO2 90% BMI 31.25 kg/m² NEUROLOGICAL EXAMINATION:    
Mental Status:   Alert and oriented to person, place, and time with recent and remote memory intact. Attention span and concentration are normal. Speech is fluent with a full fund of knowledge. Cranial Nerves:   
II, III, IV, VI:  Visual acuity grossly intact. Visual fields are normal.   
Pupils are equal, round, and reactive to light and accommodation. Extra-ocular movements are full and fluid. V-XII: Hearing is grossly intact. Facial features are symmetric, with normal sensation and strength. The palate rises symmetrically and the tongue protrudes midline. Sternocleidomastoids 5/5. Motor Examination: Normal tone, bulk, and strength. 5/5 muscle strength throughout. No cogwheel rigidity or clonus present. Sensory exam:  Normal throughout to pinprick, temperature, and vibration sense. Normal proprioception. Coordination:  Heel-to-shin was smooth and symmetrical bilaterally. Finger to nose and rapid arm movement testing was normal.  Fine postural tremor was noted in the left hand when arms were outstretched. Intermittent, high amplitude, high-frequency tremor was also noted in the left hand during movement Gait and Station:  Steady. Has difficulty with tandem walking. Normal arm swing. No Rhomberg or pronator drift. No muscle wasting or fasiculations noted. Reflexes:  DTRs 2+ throughout. Toes downgoing. LABS / IMAGING 
CT Results (most recent): 
Results from Hospital Encounter encounter on 09/21/18 CTA NECK Narrative CLINICAL HISTORY: Vision changes. EXAMINATION:  CT ANGIOGRAPHY HEAD AND NECK COMPARISON: MRI brain September 23, 2018 TECHNIQUE:  Following the uneventful administration of iodinated contrast 
material, axial CT angiography of the head and neck was performed. Delayed axial 
images through the head were also obtained. Coronal and sagittal reconstructions 
were obtained. Manual postprocessing of images was performed. 3-D  Sagittal 
maximal intensity projection images were obtained. 3-D Coronal maximal 
intensity projections were obtained. CT dose reduction was achieved through use 
of a standardized protocol tailored for this examination and automatic exposure 
control for dose modulation. Adaptive statistical iterative reconstruction 
(ASIR) was utilized. FINDINGS: 
 
Delayed contrast-enhanced head CT: 
 
Global parenchymal volume loss. Moderate periventricular and subcortical white 
matter hypodensities suggesting chronic microvascular ischemia. More confluent 
disease adjacent to the right frontal horn lateral ventricle and in the left 
anterior temporal lobe indicative of chronic infarcts. No evidence of 
hemorrhage. No midline shift. Basal cisterns are patent. CTA NECK: 
 
Great vessels: Normal arch anatomy with the origins patent. Right subclavian artery: Patent Left subclavian artery: Patent Right common carotid artery: Patent Left common carotid artery: Patent Cervical right internal carotid artery: Patent with no significant stenosis by 
 NASCET criteria. Cervical left internal carotid artery: Patent with no significant stenosis by NASCET criteria. Right vertebral artery: Patent Left vertebral artery: Patent The lung apices are clear. The thyroid is homogeneous. No cervical 
lymphadenopathy. CTA HEAD: 
 
Right cavernous internal carotid artery: Patent Left cavernous internal carotid artery: Patent Anterior cerebral arteries: Hypoplastic right A1 segment. Patent anterior 
cerebral arteries. Anterior communicating artery: Patent Right middle cerebral artery: Patent Left middle cerebral artery: Patent Posterior communicating arteries: Diminutive. Posterior cerebral arteries: Patent Basilar artery: Patent Distal vertebral arteries: Patent No evidence for intracranial aneurysm or hemodynamically significant stenosis. Impression IMPRESSION: 
1. No evidence of large vessel occlusion, intracranial aneurysm, or 
flow-limiting stenosis. 2.  Chronic cerebral atrophy, microvascular ischemia, and chronic infarcts. MRI Results (most recent): 
Results from Hospital Encounter encounter on 09/21/18 MRI BRAIN W WO CONT Narrative INDICATION: AMS / VISUAL DISTURBANCE 
 
COMPARISON: CT 9/21/2018 EXAM: Sagittal T1-weighted FLAIR, and axial T2-weighted FLAIR and T2-weighted 
fast spin-echo, axial diffusion weighted echo planar, axial T1-weighted gradient 
echo, axial susceptibility weighted gradient echo, and post IV contrast-enhanced 
axial T1-weighted spin-echo and coronal T1-weighted gradient echo MR images of 
the brain are obtained. A total of 15 cc intravenous Dotarem was administered 
for the study. FINDINGS: Gyriform increased T1-weighted signal is shown in the right occipital 
lobe with T2 shine through on diffusion weighted images consistent with recent 
though subacute infarction. A small focus of nonspecific diffusion abnormality is shown in the left insular cortex. There is also a nonspecific diffusion 
abnormality in the left superior frontal lobe. Remote left temporal infarction 
is demonstrated. Abundant nonspecific foci of white matter signal alteration or 
shown in the periventricular white matter and centrum semiovale bilaterally with 
additional areas in the left greater than right insular regions bilaterally, 
nonspecific though probably on the basis of chronic small vessel ischemic 
disease of white matter. Mild to moderate prominence of the ventricles and mild 
prominence of the cortical sulci is shown consistent with atrophy. The vascular 
flow voids at the base the brain appear normal in conspicuity sella, optic 
chiasm, orbits and paranasal sinuses appear normal. 
  
 Impression IMPRESSION: Evidence for recent infarction involving the cortex of the right 
occipital lobe. Remote left MCA territory infarction and abundant chronic small 
vessel ischemic disease the white matter demonstrated. MRI images were independently reviewed Lab Results Component Value Date/Time Cholesterol, total 124 09/24/2018 03:34 AM  
 HDL Cholesterol 48 09/24/2018 03:34 AM  
 LDL, calculated 60.2 09/24/2018 03:34 AM  
 VLDL, calculated 15.8 09/24/2018 03:34 AM  
 Triglyceride 79 09/24/2018 03:34 AM  
 CHOL/HDL Ratio 2.6 09/24/2018 03:34 AM  
 
 
 
ASSESSMENT 
  ICD-10-CM ICD-9-CM 1. Cerebrovascular accident (CVA) due to embolism of cerebral artery (HCC) I63.40 434.11   
2. Tremor of left hand R25.1 781.0 gabapentin (NEURONTIN) 300 mg capsule DISCUSSION Ms. Kacie Wheeler had embolic appearing infarctions in the left MCA territory and right occipital lobe. These were suspected to be due to cardioembolism given underlying history of MI, reduced ejection fraction. No A. fib was ever captured. She is still waiting to get her Holter monitoring results. She is now on aspirin and Eliquis which she should continue Also takes a statin Continue with PT/OT Will reassess her cognition at the next visit i.e. 6 months from stroke Leonie Carr MD 
Diplomate, American Board of Psychiatry & Neurology (Neurology) Michell Dhaliwal Board of Psychiatry & Neurology (Clinical Neurophysiology) Diplomate, [address] [place] [address] Board of Electrodiagnostic Medicine This note will not be viewable in [address]

## 2018-12-20 ENCOUNTER — HOSPITAL ENCOUNTER (OUTPATIENT)
Dept: GENERAL RADIOLOGY | Age: 83
Discharge: HOME | End: 2018-12-20
Attending: INTERNAL MEDICINE
Payer: MEDICARE

## 2018-12-20 DIAGNOSIS — R60.9 EDEMA, UNSPECIFIED TYPE: ICD-10-CM

## 2018-12-20 PROCEDURE — 71046 X-RAY EXAM CHEST 2 VIEWS: CPT

## 2019-03-07 ENCOUNTER — HOSPITAL ENCOUNTER (OUTPATIENT)
Dept: MAMMOGRAPHY | Age: 84
Discharge: HOME | End: 2019-03-07
Attending: INTERNAL MEDICINE

## 2019-03-07 ENCOUNTER — OFFICE VISIT (OUTPATIENT)
Dept: NEUROLOGY | Age: 84
End: 2019-03-07

## 2019-03-07 VITALS
OXYGEN SATURATION: 91 % | BODY MASS INDEX: 31.41 KG/M2 | DIASTOLIC BLOOD PRESSURE: 64 MMHG | RESPIRATION RATE: 18 BRPM | HEART RATE: 110 BPM | HEIGHT: 60 IN | SYSTOLIC BLOOD PRESSURE: 126 MMHG | WEIGHT: 160 LBS

## 2019-03-07 DIAGNOSIS — N64.4 BREAST PAIN, LEFT: ICD-10-CM

## 2019-03-07 DIAGNOSIS — R25.1 TREMOR OF LEFT HAND: Primary | ICD-10-CM

## 2019-03-07 DIAGNOSIS — G44.219 EPISODIC TENSION-TYPE HEADACHE, NOT INTRACTABLE: ICD-10-CM

## 2019-03-07 DIAGNOSIS — Z86.73 OLD CEREBROVASCULAR ACCIDENT (CVA) WITHOUT LATE EFFECT: ICD-10-CM

## 2019-03-07 DIAGNOSIS — Z12.39 SCREENING BREAST EXAMINATION: ICD-10-CM

## 2019-03-07 DIAGNOSIS — F32.A DEPRESSION, UNSPECIFIED DEPRESSION TYPE: ICD-10-CM

## 2019-03-07 RX ORDER — DULOXETIN HYDROCHLORIDE 30 MG/1
CAPSULE, DELAYED RELEASE ORAL
Qty: 30 CAP | Refills: 1 | Status: SHIPPED | OUTPATIENT
Start: 2019-03-07 | End: 2019-06-21

## 2019-03-07 NOTE — PROGRESS NOTES
After discussing diagnostic mammo vs screening mammo, patient and granddaughter decided to wait until 06/2019 to have screening mammo. I notified Effie Gresham in Dr. Darrell Mathur' office about this.

## 2019-03-07 NOTE — PROGRESS NOTES
Chief Complaint   Patient presents with    Stroke         HISTORY OF PRESENT ILLNESS  Kelly Malik came back for follow-up. She was last seen in November. She came in with her granddaughter to discuss some symptoms that she has been having over the past couple of months. One is that she complains of itching all over her body especially at night. She is scheduled to see a dermatologist in the near future. She has tried some local creams which have not made any difference. She has also had some hair loss in the back of her head and is wondering if it could be related to the stroke. Her mood has been low for granddaughter and she has been obsessing about certain things. She worries a lot about her belongings and her property. She has been complaining of short-term memory issues and forgetfulness but is independent with activities of daily living    Complains of headaches off and on which are dull aches not associated with light sensitivity or noise sensitivity. Occasionally she gets neck pain. Her balance has been off and has been using a cane to stabilize herself. Denies any falls. She can walk unassisted. RECAP  She was admitted to the hospital in November 2018 with confusion and questionable hallucinations. She has also reported visual difficulties for the past few months. She had difficulties with word finding and thought processing. MRI brain showed acute infarction in the right occipital cortex. There is evidence of restricted diffusion in the left frontal lobe cortex as well. Extensive white matter disease. CTA of the head and neck was negative. Cardioembolism was suspected and given her history of MI, congestive heart failure with reduced ejection fraction, she was started on aspirin and Eliquis as per recommendations from cardiology. She went to rehab and then eventually shifted to [facility] assisted living facility where she lives now.   She has seen ophthalmology and her eye exam was negative. She is due for a follow-up         Current Outpatient Medications   Medication Sig    DULoxetine (CYMBALTA) 30 mg capsule 1 daily x 1wk, then 2 daily    doxepin (SINEQUAN) 25 mg capsule Take 1 Cap by mouth nightly.  permethrin (ACTICIN) 5 % topical cream APPLY TO AFFECTED AREA NOW FOR 1 DOSE, THEN APPLY SPARINGLY AS DIRECTED    furosemide (LASIX) 20 mg tablet Take 1 Tab by mouth daily.  apixaban (ELIQUIS) 5 mg tablet Take 1 Tab by mouth two (2) times a day.  traMADol (ULTRAM) 50 mg tablet Take 50 mg by mouth two (2) times a day.  acetaminophen (TYLENOL) 500 mg tablet Take 500 mg by mouth as needed for Pain.  cholecalciferol, vitamin D3, (VITAMIN D3 PO) Take 1 Tab by mouth daily. Unsure of dose    cetirizine (ZYRTEC) 10 mg tablet Take 10 mg by mouth daily.  multivitamin (ONE A DAY) tablet Take 1 Tab by mouth daily.  metoprolol succinate (TOPROL-XL) 50 mg XL tablet TAKE 1&1/2 TABLETS BY MOUTH EVERY HS    nitroglycerin (NITROSTAT) 0.4 mg SL tablet every five (5) minutes as needed.  aspirin delayed-release 81 mg tablet Take 81 mg by mouth daily.  allopurinol (ZYLOPRIM) 300 mg tablet Take 1/2 tablet by mouth daily. For tophaceous gout    atorvastatin (LIPITOR) 20 mg tablet Take 20 mg by mouth nightly.  lisinopril (PRINIVIL, ZESTRIL) 10 mg tablet Take 10 mg by mouth daily.  gabapentin (NEURONTIN) 300 mg capsule TAKE 1 CAP TID X 3 days, then BID X3 days, then daily x 3 days, then stop     No current facility-administered medications for this visit. PHYSICAL EXAMINATION:    Visit Vitals  /64   Pulse (!) 110   Resp 18   Ht 5' (1.524 m)   Wt 72.6 kg (160 lb)   SpO2 91%   BMI 31.25 kg/m²       NEUROLOGICAL EXAMINATION:     Mental Status:   Alert and oriented to person, place, and time with recent and remote memory intact. Attention span and concentration are normal. Speech is fluent.       Cranial Nerves:    II, III, IV, VI:  Visual acuity grossly intact. Visual fields are normal.    Pupils are equal, round, and reactive to light and accommodation. Extra-ocular movements are full and fluid. V-XII: Hearing is grossly intact. Facial features are symmetric, with normal sensation and strength. The palate rises symmetrically and the tongue protrudes midline. Sternocleidomastoids 5/5. Motor Examination: Normal tone, bulk, and strength. 5/5 muscle strength throughout. No cogwheel rigidity or clonus present. Sensory exam:  Normal throughout to pinprick, temperature, and vibration sense. Normal proprioception. Coordination: Finger to nose and rapid arm movement testing was normal.  Fine postural tremor was noted in the left hand when arms were outstretched. Intermittent, high amplitude, high-frequency tremor was also noted in the left hand during movement     Gait and Station:  Steady. Has difficulty with tandem walking. Normal arm swing. No Rhomberg or pronator drift. No muscle wasting or fasiculations noted. Reflexes:  DTRs 2+ throughout. Toes downgoing. LABS / IMAGING    MRI Results (most recent):  Results from Hospital Encounter encounter on 09/21/18   MRI BRAIN W WO CONT    Narrative INDICATION: AMS / VISUAL DISTURBANCE    COMPARISON: CT 9/21/2018    EXAM: Sagittal T1-weighted FLAIR, and axial T2-weighted FLAIR and T2-weighted  fast spin-echo, axial diffusion weighted echo planar, axial T1-weighted gradient  echo, axial susceptibility weighted gradient echo, and post IV contrast-enhanced  axial T1-weighted spin-echo and coronal T1-weighted gradient echo MR images of  the brain are obtained. A total of 15 cc intravenous Dotarem was administered  for the study. FINDINGS: Gyriform increased T1-weighted signal is shown in the right occipital  lobe with T2 shine through on diffusion weighted images consistent with recent  though subacute infarction.  A small focus of nonspecific diffusion abnormality  is shown in the left insular cortex. There is also a nonspecific diffusion  abnormality in the left superior frontal lobe. Remote left temporal infarction  is demonstrated. Abundant nonspecific foci of white matter signal alteration or  shown in the periventricular white matter and centrum semiovale bilaterally with  additional areas in the left greater than right insular regions bilaterally,  nonspecific though probably on the basis of chronic small vessel ischemic  disease of white matter. Mild to moderate prominence of the ventricles and mild  prominence of the cortical sulci is shown consistent with atrophy. The vascular  flow voids at the base the brain appear normal in conspicuity sella, optic  chiasm, orbits and paranasal sinuses appear normal.      Impression IMPRESSION: Evidence for recent infarction involving the cortex of the right  occipital lobe. Remote left MCA territory infarction and abundant chronic small  vessel ischemic disease the white matter demonstrated.           MRI images were independently reviewed    Lab Results   Component Value Date/Time    Cholesterol, total 124 09/24/2018 03:34 AM    HDL Cholesterol 48 09/24/2018 03:34 AM    LDL, calculated 60.2 09/24/2018 03:34 AM    VLDL, calculated 15.8 09/24/2018 03:34 AM    Triglyceride 79 09/24/2018 03:34 AM    CHOL/HDL Ratio 2.6 09/24/2018 03:34 AM       Lab Results   Component Value Date/Time    WBC 8.0 09/22/2018 02:34 AM    HGB (POC) 12.4 07/07/2017 04:19 PM    HGB 11.3 (L) 09/22/2018 02:34 AM    HCT (POC) 39.1 07/07/2017 04:19 PM    HCT 35.6 09/22/2018 02:34 AM    PLATELET 325 68/33/1810 02:34 AM    MCV 98.6 09/22/2018 02:34 AM     Lab Results   Component Value Date/Time    Sodium 145 (H) 12/20/2018 11:47 AM    Potassium 4.2 12/20/2018 11:47 AM    Chloride 103 12/20/2018 11:47 AM    CO2 29 12/20/2018 11:47 AM    Anion gap 11 09/22/2018 02:34 AM    Glucose 85 12/20/2018 11:47 AM    BUN 17 12/20/2018 11:47 AM    Creatinine 1.15 (H) 12/20/2018 11:47 AM BUN/Creatinine ratio 15 12/20/2018 11:47 AM    GFR est AA 50 (L) 12/20/2018 11:47 AM    GFR est non-AA 43 (L) 12/20/2018 11:47 AM    Calcium 9.6 12/20/2018 11:47 AM    Bilirubin, total 0.6 12/20/2018 11:47 AM    AST (SGOT) 23 12/20/2018 11:47 AM    Alk. phosphatase 86 12/20/2018 11:47 AM    Protein, total 6.4 12/20/2018 11:47 AM    Albumin 4.4 12/20/2018 11:47 AM    Globulin 4.1 (H) 09/21/2018 03:37 PM    A-G Ratio 2.2 12/20/2018 11:47 AM    ALT (SGPT) 13 12/20/2018 11:47 AM         ASSESSMENT    ICD-10-CM ICD-9-CM    1. Tremor of left hand R25.1 781.0    2. Old cerebrovascular accident (CVA) without late effect Z86.73 V12.54    3. Depression, unspecified depression type F32.9 311 DULoxetine (CYMBALTA) 30 mg capsule   4. Episodic tension-type headache, not intractable G44.219 339.11        DISCUSSION  Ms. Trupti Alcala had embolic appearing infarctions in the left MCA territory and right occipital lobe. These were suspected to be due to cardioembolism given underlying history of MI, reduced ejection fraction. No A. fib was ever captured.    She should continue aspirin and Eliquis along with statin    She has been complaining of multiple issues including generalized itching in her body, short-term memory issues, balance problems, mild headaches and sadness  I have recommended trial of Cymbalta 30 mg daily for 1 week and then increasing to 60 mg daily  I am expecting that this can help elevate her mood, help with neck pain, headaches and possibly with itching if it is suspected to be neuropathic symptom  We will see her back in 3 months and do cognitive assessment  She was reassured that I do not see any signs of parkinsonism and tremor in the hand appears to be of a benign type    Madison Schuster MD  Diplomate, American Board of Psychiatry & Neurology (Neurology)  Diplomate, [address] [place] [address] Rd., [address] of Psychiatry & Neurology (Clinical Neurophysiology)  Diplomate, American Board of Electrodiagnostic Medicine    This note will not be viewable in [address]

## 2019-03-07 NOTE — PATIENT INSTRUCTIONS
A Healthy Lifestyle: Care Instructions  Your Care Instructions    A healthy lifestyle can help you feel good, stay at a healthy weight, and have plenty of energy for both work and play. A healthy lifestyle is something you can share with your whole family. A healthy lifestyle also can lower your risk for serious health problems, such as high blood pressure, heart disease, and diabetes. You can follow a few steps listed below to improve your health and the health of your family. Follow-up care is a key part of your treatment and safety. Be sure to make and go to all appointments, and call your doctor if you are having problems. It's also a good idea to know your test results and keep a list of the medicines you take. How can you care for yourself at home? · Do not eat too much sugar, fat, or fast foods. You can still have dessert and treats now and then. The goal is moderation. · Start small to improve your eating habits. Pay attention to portion sizes, drink less juice and soda pop, and eat more fruits and vegetables. ? Eat a healthy amount of food. A 3-ounce serving of meat, for example, is about the size of a deck of cards. Fill the rest of your plate with vegetables and whole grains. ? Limit the amount of soda and sports drinks you have every day. Drink more water when you are thirsty. ? Eat at least 5 servings of fruits and vegetables every day. It may seem like a lot, but it is not hard to reach this goal. A serving or helping is 1 piece of fruit, 1 cup of vegetables, or 2 cups of leafy, raw vegetables. Have an apple or some carrot sticks as an afternoon snack instead of a candy bar. Try to have fruits and/or vegetables at every meal.  · Make exercise part of your daily routine. You may want to start with simple activities, such as walking, bicycling, or slow swimming. Try to be active 30 to 60 minutes every day. You do not need to do all 30 to 60 minutes all at once.  For example, you can exercise 3 times a day for 10 or 20 minutes. Moderate exercise is safe for most people, but it is always a good idea to talk to your doctor before starting an exercise program.  · Keep moving. Robbi Mathias the lawn, work in the garden, or [employer]. Take the stairs instead of the elevator at work. · If you smoke, quit. People who smoke have an increased risk for heart attack, stroke, cancer, and other lung illnesses. Quitting is hard, but there are ways to boost your chance of quitting tobacco for good. ? Use nicotine gum, patches, or lozenges. ? Ask your doctor about stop-smoking programs and medicines. ? Keep trying. In addition to reducing your risk of diseases in the future, you will notice some benefits soon after you stop using tobacco. If you have shortness of breath or asthma symptoms, they will likely get better within a few weeks after you quit. · Limit how much alcohol you drink. Moderate amounts of alcohol (up to 2 drinks a day for men, 1 drink a day for women) are okay. But drinking too much can lead to liver problems, high blood pressure, and other health problems. Family health  If you have a family, there are many things you can do together to improve your health. · Eat meals together as a family as often as possible. · Eat healthy foods. This includes fruits, vegetables, lean meats and dairy, and whole grains. · Include your family in your fitness plan. Most people think of activities such as jogging or tennis as the way to fitness, but there are many ways you and your family can be more active. Anything that makes you breathe hard and gets your heart pumping is exercise. Here are some tips:  ? Walk to do errands or to take your child to school or the bus.  ? Go for a family bike ride after dinner instead of watching TV. Where can you learn more? Go to http://hetal-skylar.info/. Enter D157 in the search box to learn more about \"A Healthy Lifestyle: Care Instructions. \"  Current as of: September 11, 2018  Content Version: 11.9  © 3512-5297 [employer]. Care instructions adapted under license by [employer] (which disclaims liability or warranty for this information). If you have questions about a medical condition or this instruction, always ask your healthcare professional. [address] any warranty or liability for your use of this information. [facility]   Statement to Patients  April 1, 2014      In an effort to ensure the large volume of patient prescription refills is processed in the most efficient and expeditious manner, we are asking our patients to assist us by calling your Pharmacy for all prescription refills, this will include also your  Mail Order Pharmacy. The pharmacy will contact our office electronically to continue the refill process. Please do not wait until the last minute to call your pharmacy. We need at least 48 hours (2days) to fill prescriptions. We also encourage you to call your pharmacy before going to  your prescription to make sure it is ready. With regard to controlled substance prescription refill requests (narcotic refills) that need to be picked up at our office, we ask your cooperation by providing us with at least 72 hours (3days) notice that you will need a refill. We will not refill narcotic prescription refill requests after 4:00pm on any weekday, Monday through Thursday, or after 2:00pm on Fridays, or on the weekends. We encourage everyone to explore another way of getting your prescription refill request processed using [employer], our patient web portal through our electronic medical record system. [employer] is an efficient and effective way to communicate your medication request directly to the office and  downloadable as an sandra on your smart phone .  [employer] also features a review functionality that allows you to view your medication list as well as leave messages for your physician. Are you ready to get connected? If so please review the attatched instructions or speak to any of our staff to get you set up right away! Thank you so much for your cooperation. Should you have any questions please contact our Practice Administrator.     The Physicians and Staff,  [facility]

## 2019-03-18 ENCOUNTER — APPOINTMENT (OUTPATIENT)
Dept: GENERAL RADIOLOGY | Age: 84
End: 2019-03-18
Attending: EMERGENCY MEDICINE
Payer: MEDICARE

## 2019-03-18 ENCOUNTER — HOSPITAL ENCOUNTER (EMERGENCY)
Age: 84
Discharge: HOME | End: 2019-03-18
Attending: EMERGENCY MEDICINE
Payer: MEDICARE

## 2019-03-18 VITALS
OXYGEN SATURATION: 99 % | RESPIRATION RATE: 14 BRPM | TEMPERATURE: 98.3 F | SYSTOLIC BLOOD PRESSURE: 102 MMHG | DIASTOLIC BLOOD PRESSURE: 72 MMHG | HEART RATE: 74 BPM

## 2019-03-18 DIAGNOSIS — M47.812 ARTHRITIS OF NECK: Primary | ICD-10-CM

## 2019-03-18 DIAGNOSIS — R21 RASH OF UNKNOWN CAUSE: ICD-10-CM

## 2019-03-18 PROCEDURE — 72052 X-RAY EXAM NECK SPINE 6/>VWS: CPT

## 2019-03-18 PROCEDURE — 74011250637 HC RX REV CODE- 250/637: Performed by: EMERGENCY MEDICINE

## 2019-03-18 PROCEDURE — 99283 EMERGENCY DEPT VISIT LOW MDM: CPT

## 2019-03-18 RX ORDER — TRAMADOL HYDROCHLORIDE 50 MG/1
50 TABLET ORAL
Status: COMPLETED | OUTPATIENT
Start: 2019-03-18 | End: 2019-03-18

## 2019-03-18 RX ORDER — LIDOCAINE 50 MG/G
PATCH TOPICAL
Qty: 30 EACH | Refills: 0 | Status: SHIPPED | OUTPATIENT
Start: 2019-03-18

## 2019-03-18 RX ADMIN — TRAMADOL HYDROCHLORIDE 50 MG: 50 TABLET, FILM COATED ORAL at 12:41

## 2019-03-18 NOTE — ED PROVIDER NOTES
HPI   80 y.o. female presents with neck pain x 1 week. Seen dermatoligist for evaluation of lumps in her skin that have been present for several months, last saw last week. They are in the outpatient evaluation of the rash areas on her skin, but as of yet, uncertain diagnosis, discussing possible biopsy. She is planning on doing physical therapy this week, rx'd by her doctor who believes that her neck pain is likely arthritis associated, but hasnt started yet. Takes tramadol for pain, last took this AM at 0300. No falls or trauma, no numbness or weakness. She was advised that she may need an MRI and possible neurosurgery evaluation eventually if her pain continues despite conservative measures. No acute injuries, no acute changes in her rash. No signs of anaphylaxis.      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     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    Venous insufficiency 5/17/2011       Past Surgical History:   Procedure Laterality Date    CARDIAC SURG PROCEDURE UNLIST  2015    STENT PLACEMENTS X2    HX APPENDECTOMY      YRS AGO PT DON`T REMEMBER     HX BREAST BIOPSY Left 1980`S    HX BREAST LUMPECTOMY  1988    dcis lelf breast    HX CATARACT REMOVAL Bilateral 1980`S    HX COLONOSCOPY      HX CORONARY STENT PLACEMENT  2015    X6    HX GI      COLONOSCOPY    HX HYSTERECTOMY  1960    HX OTHER SURGICAL      ORAL    HX OTHER SURGICAL  07/2018    SQUAMOUS CELL CA REMOVED FROM RIGHT FOREARM     TOTAL HIP ARTHROPLASTY Left 2018    VASCULAR SURGERY PROCEDURE UNLIST      varicose veins         Family History:   Problem Relation Age of Onset    Cancer Sister         breast cancer    Breast Cancer Sister 61    Cancer Mother         PANCREAS     Diabetes Father     Other Father         PVD- AMPUTATION BL LE    Heart Disease Brother     Other Brother         ALS    Heart Failure Son     Heart Disease Son     Hypertension Son     Anesth Problems Neg Hx        Social History     Socioeconomic History    Marital status:      Spouse name: Not on file    Number of children: Not on file    Years of education: Not on file    Highest education level: Not on file   Social Needs    Financial resource strain: Not on file    Food insecurity - worry: Not on file    Food insecurity - inability: Not on file   [employer] needs - medical: Not on file   [employer] needs - non-medical: Not on file   Occupational History    Not on file   Tobacco Use    Smoking status: Never Smoker    Smokeless tobacco: Never Used   Substance and Sexual Activity    Alcohol use: No    Drug use: No    Sexual activity: Not on file   Other Topics Concern    Not on file   Social History Narrative    Not on file         ALLERGIES: Amoxicillin-pot clavulanate; Cephalexin; Doxycycline; Neomycin-polymyxin-hc; Rocephin [ceftriaxone]; and Sulfamethoxazole-trimethoprim    Review of Systems   Constitutional: Negative for activity change, appetite change, chills and fever. HENT: Negative for congestion, rhinorrhea, sinus pressure, sneezing and sore throat. Eyes: Negative for photophobia and visual disturbance. Respiratory: Negative for cough and shortness of breath. Cardiovascular: Negative for chest pain. Gastrointestinal: Negative for abdominal pain, blood in stool, constipation, diarrhea, nausea and vomiting.    Genitourinary: Negative for difficulty urinating, dysuria, flank pain, frequency, hematuria, menstrual problem, urgency, vaginal bleeding and vaginal discharge. Musculoskeletal: Positive for neck pain and neck stiffness (decreased ROM when looking to the left, but normal to the right). Negative for arthralgias, back pain and myalgias. Skin: Positive for rash. Negative for wound. Neurological: Negative for syncope, speech difficulty, weakness, numbness and headaches. Psychiatric/Behavioral: Negative for self-injury and suicidal ideas. All other systems reviewed and are negative. Vitals:    03/18/19 1222   Pulse: 88   SpO2: 98%            Physical Exam   Constitutional: She is oriented to person, place, and time. She appears well-developed and well-nourished. No distress. Anxious appearing   HENT:   Head: Normocephalic and atraumatic. Nose: Nose normal.   Mouth/Throat: Oropharynx is clear and moist.   Eyes: Conjunctivae and EOM are normal. Pupils are equal, round, and reactive to light. Neck: Neck supple. Spinous process tenderness and muscular tenderness present. No neck rigidity. Decreased range of motion present. Decreased ROM to the left but full ROM to the left. Negative spurling's b/l   Cardiovascular: Normal rate, regular rhythm, normal heart sounds and intact distal pulses. Pulmonary/Chest: Effort normal and breath sounds normal.   Abdominal: Soft. She exhibits no distension. There is no tenderness. Musculoskeletal: She exhibits no edema or tenderness. Mild midline c-spine tenderness, but no midline T or L spine tenderness. No crepitus or deformity no evidence of trauma. Ambulatory without difficulty   Neurological: She is alert and oriented to person, place, and time. She has normal strength. No cranial nerve deficit or sensory deficit. Coordination normal. GCS eye subscore is 4. GCS verbal subscore is 5. GCS motor subscore is 6.   5/5 strength in all 4 extremities   Skin: Skin is warm and dry. Rash noted. She is not diaphoretic.    Chronic appearing red, mildly raised lumps on her left ear and back of her neck as well as on her scalp. Chronic. No evidence of acute abscess, does not appear to be infectious. Sx are bilateral, frederick not appear to be shingles. Nursing note and vitals reviewed. MDM     Chronic appearing rash of unknown etiology, possibly drug associated. Nonemergent. advised to continue following up with dermatologist for further evaluation. Neck pain given dose of tramadol    Cervical XR done, viewed by myself and read by radiology showing significant multilevel degenerative changes, but no acute abnormalities. rec'd continued use of tramadol and rx'd lidocaine patches to use as well. rec'd proceeding with PT regimen as scheduled and rec'd PCP follow up for further evaluation of her sx. Return precautions were given for worsening or concerns.      Procedures

## 2019-03-18 NOTE — ED TRIAGE NOTES
Patient presents from home with complaints of headache and neck pain that started last week.   Patient also reports that she has \"balls of fluid\" in her head that are \"soft\"  Patient reports she has been to the dermatologist for this issue

## 2019-05-29 ENCOUNTER — TELEPHONE (OUTPATIENT)
Dept: NEUROLOGY | Age: 84
End: 2019-05-29

## 2019-05-29 NOTE — TELEPHONE ENCOUNTER
----- Message from [facility] sent at 5/29/2019 12:42 PM EDT -----  Regarding: Dr. Yoselyn Helm  Pt daughter  Cristhian [place] [phone] says that the pt needs to come in sooner than the scheduled appt. Pt says that she has worms in her head and she takes a letter opener and digs in her scalp trying to get the worms out. The family has taken her to a dermatologist and the derm has adv that there is nothing wrong. The dermatologist increased the pt gabapentin and now the pt shakes very bad. PT candyericeverett would like her to be seen asap. Currently pt is scheduled for  Monday, June 17, 2019 @ 01:00 PM. Pls call pt daughter schedule. Pt has dementia and the daughter is not sure if this is a part of her illness.

## 2019-05-30 NOTE — TELEPHONE ENCOUNTER
Spoke with daughter, the patient has the first available at the moment. Daughter verbalized understanding.

## 2019-06-11 ENCOUNTER — HOSPITAL ENCOUNTER (OUTPATIENT)
Dept: MAMMOGRAPHY | Age: 84
Discharge: HOME | End: 2019-06-11
Payer: MEDICARE

## 2019-06-11 DIAGNOSIS — Z12.39 BREAST CANCER SCREENING: ICD-10-CM

## 2019-06-11 PROCEDURE — 77063 BREAST TOMOSYNTHESIS BI: CPT

## 2019-06-17 ENCOUNTER — TELEPHONE (OUTPATIENT)
Dept: NEUROLOGY | Age: 84
End: 2019-06-17

## 2019-06-17 NOTE — TELEPHONE ENCOUNTER
Patient came in to the clinic thinking that she had an appointment, pt was very upset. Wanting to see if she can be seen sooner then July or sept. One of the daughters said the pt thinks she has worms growing in her head and eating her brain.  Please advise

## 2019-07-09 ENCOUNTER — OFFICE VISIT (OUTPATIENT)
Dept: NEUROLOGY | Age: 84
End: 2019-07-09

## 2019-07-09 VITALS
SYSTOLIC BLOOD PRESSURE: 128 MMHG | RESPIRATION RATE: 18 BRPM | HEART RATE: 76 BPM | HEIGHT: 60 IN | DIASTOLIC BLOOD PRESSURE: 77 MMHG | OXYGEN SATURATION: 97 % | WEIGHT: 170 LBS | BODY MASS INDEX: 33.38 KG/M2

## 2019-07-09 DIAGNOSIS — R41.3 MEMORY LOSS: ICD-10-CM

## 2019-07-09 DIAGNOSIS — Z86.73 OLD CEREBROVASCULAR ACCIDENT (CVA) WITHOUT LATE EFFECT: Primary | ICD-10-CM

## 2019-07-09 DIAGNOSIS — F32.A DEPRESSION, UNSPECIFIED DEPRESSION TYPE: ICD-10-CM

## 2019-07-09 NOTE — PROGRESS NOTES
Pt here for follow up from her strokes. Pt feels she is doing better. Eyes are worse, derm has seen her for itching but pt says she has bugs in her hair (when there are not any)  Doing much better off the tramadol and gabapentin. Reminder that meds have to be printed to take to [facility].

## 2019-07-09 NOTE — PROGRESS NOTES
Chief Complaint   Patient presents with    Stroke         HISTORY OF PRESENT ILLNESS  Natalie Miller came back for follow-up. She was last seen March. She came in with 3 family members. She is currently living at [address] [facility]. Overall, she is doing better. She herself denies any problems. Her habitual itching is now better ever since she stopped gabapentin started taking Cymbalta. Her mood is also better. She lost her  last year. Her short-term memory seems to be a problem and she will tend to repeat herself. Occasionally she will have hallucinations and will see dead people. She was having difficulty sleeping but that has improved with doxepin. She is independent with activities of daily living    RECAP  She was admitted to the hospital in November 2018 with confusion and questionable hallucinations. She has also reported visual difficulties for the past few months. She had difficulties with word finding and thought processing. MRI brain showed acute infarction in the right occipital cortex. There is evidence of restricted diffusion in the left frontal lobe cortex as well. Extensive white matter disease. CTA of the head and neck was negative. Cardioembolism was suspected and given her history of MI, congestive heart failure with reduced ejection fraction, she was started on aspirin and Eliquis as per recommendations from cardiology. She went to rehab and then eventually shifted to [facility] assisted living facility where she lives now. She has seen ophthalmology and her eye exam was negative. She is due for a follow-up         Current Outpatient Medications   Medication Sig    fluticasone propionate (FLONASE ALLERGY RELIEF NA) by Nasal route.  DULoxetine (CYMBALTA) 60 mg capsule Take 1 Cap by mouth daily.  lidocaine (LIDODERM) 5 % Apply patch to the affected area for 12 hours a day and remove for 12 hours a day.     doxepin (SINEQUAN) 25 mg capsule Take 1 Cap by mouth nightly.  furosemide (LASIX) 20 mg tablet Take 1 Tab by mouth daily.  apixaban (ELIQUIS) 5 mg tablet Take 1 Tab by mouth two (2) times a day.  acetaminophen (TYLENOL) 500 mg tablet Take 500 mg by mouth as needed for Pain.  cholecalciferol, vitamin D3, (VITAMIN D3 PO) Take 1 Tab by mouth daily. Unsure of dose    cetirizine (ZYRTEC) 10 mg tablet Take 10 mg by mouth daily.  multivitamin (ONE A DAY) tablet Take 1 Tab by mouth daily.  metoprolol succinate (TOPROL-XL) 50 mg XL tablet TAKE 1&1/2 TABLETS BY MOUTH EVERY HS    nitroglycerin (NITROSTAT) 0.4 mg SL tablet every five (5) minutes as needed.  aspirin delayed-release 81 mg tablet Take 81 mg by mouth daily.  allopurinol (ZYLOPRIM) 300 mg tablet Take 1/2 tablet by mouth daily. For tophaceous gout    atorvastatin (LIPITOR) 20 mg tablet Take 20 mg by mouth nightly.  lisinopril (PRINIVIL, ZESTRIL) 10 mg tablet Take 10 mg by mouth daily.  permethrin (ACTICIN) 5 % topical cream APPLY TO AFFECTED AREA NOW FOR 1 DOSE, THEN APPLY SPARINGLY AS DIRECTED     No current facility-administered medications for this visit. PHYSICAL EXAMINATION:    Visit Vitals  /77   Pulse 76   Resp 18   Ht 5' (1.524 m)   Wt 77.1 kg (170 lb)   SpO2 97%   BMI 33.20 kg/m²       NEUROLOGICAL EXAMINATION:     Mental Status:   Alert and oriented to person, place, and time with recent and remote memory intact. She scored 25/30 on [place] cognitive assessment. Her short-term recall was 3/5. Attention span and concentration are normal. Speech is fluent. Cranial Nerves:    II, III, IV, VI:  Visual acuity grossly intact. Visual fields are normal.    Pupils are equal, round, and reactive to light and accommodation. Extra-ocular movements are full and fluid. V-XII: Hearing is grossly intact. Facial features are symmetric, with normal sensation and strength. The palate rises symmetrically and the tongue protrudes midline. Sternocleidomastoids 5/5. Motor Examination: Normal tone, bulk, and strength. 5/5 muscle strength throughout. No cogwheel rigidity or clonus present. Sensory exam:  Normal throughout to pinprick, temperature, and vibration sense. Normal proprioception. Coordination: Finger to nose and rapid arm movement testing was normal.  Fine postural tremor was noted in the left hand when arms were outstretched. Intermittent, high amplitude, high-frequency tremor was also noted in the left hand during movement     Gait and Station:  Steady. Has difficulty with tandem walking. Normal arm swing. No Rhomberg or pronator drift. No muscle wasting or fasiculations noted. Reflexes:  DTRs 2+ throughout. Toes downgoing. LABS / IMAGING    MRI Results (most recent):  Results from Hospital Encounter encounter on 09/21/18   MRI BRAIN W WO CONT    Narrative INDICATION: AMS / VISUAL DISTURBANCE    COMPARISON: CT 9/21/2018    EXAM: Sagittal T1-weighted FLAIR, and axial T2-weighted FLAIR and T2-weighted  fast spin-echo, axial diffusion weighted echo planar, axial T1-weighted gradient  echo, axial susceptibility weighted gradient echo, and post IV contrast-enhanced  axial T1-weighted spin-echo and coronal T1-weighted gradient echo MR images of  the brain are obtained. A total of 15 cc intravenous Dotarem was administered  for the study. FINDINGS: Gyriform increased T1-weighted signal is shown in the right occipital  lobe with T2 shine through on diffusion weighted images consistent with recent  though subacute infarction. A small focus of nonspecific diffusion abnormality  is shown in the left insular cortex. There is also a nonspecific diffusion  abnormality in the left superior frontal lobe. Remote left temporal infarction  is demonstrated.  Abundant nonspecific foci of white matter signal alteration or  shown in the periventricular white matter and centrum semiovale bilaterally with  additional areas in the left greater than right insular regions bilaterally,  nonspecific though probably on the basis of chronic small vessel ischemic  disease of white matter. Mild to moderate prominence of the ventricles and mild  prominence of the cortical sulci is shown consistent with atrophy. The vascular  flow voids at the base the brain appear normal in conspicuity sella, optic  chiasm, orbits and paranasal sinuses appear normal.      Impression IMPRESSION: Evidence for recent infarction involving the cortex of the right  occipital lobe. Remote left MCA territory infarction and abundant chronic small  vessel ischemic disease the white matter demonstrated.             Lab Results   Component Value Date/Time    Cholesterol, total 124 09/24/2018 03:34 AM    Cholesterol (POC) 138 06/21/2019 02:22 PM    HDL Cholesterol 48 09/24/2018 03:34 AM    HDL Cholesterol (POC) 60 06/21/2019 02:22 PM    LDL Cholesterol (POC) 63 06/21/2019 02:22 PM    LDL, calculated 60.2 09/24/2018 03:34 AM    VLDL, calculated 15.8 09/24/2018 03:34 AM    Triglyceride 79 09/24/2018 03:34 AM    Triglycerides (POC) 80 06/21/2019 02:22 PM    CHOL/HDL Ratio 2.6 09/24/2018 03:34 AM       Lab Results   Component Value Date/Time    WBC 8.0 09/22/2018 02:34 AM    HGB (POC) 12.0 06/03/2019 12:39 PM    HGB 11.3 (L) 09/22/2018 02:34 AM    HCT (POC) 35.4 06/03/2019 12:39 PM    HCT 35.6 09/22/2018 02:34 AM    PLATELET 456 45/67/4760 02:34 AM    MCV 98.6 09/22/2018 02:34 AM     Lab Results   Component Value Date/Time    Sodium 145 (H) 06/03/2019 12:22 PM    Potassium 4.9 06/03/2019 12:22 PM    Chloride 104 06/03/2019 12:22 PM    CO2 27 06/03/2019 12:22 PM    Anion gap 11 09/22/2018 02:34 AM    Glucose 99 06/03/2019 12:22 PM    BUN 21 06/03/2019 12:22 PM    Creatinine 1.05 (H) 06/03/2019 12:22 PM    BUN/Creatinine ratio 20 06/03/2019 12:22 PM    GFR est AA 56 (L) 06/03/2019 12:22 PM    GFR est non-AA 49 (L) 06/03/2019 12:22 PM    Calcium 9.3 06/03/2019 12:22 PM    Bilirubin, total 0.4 06/03/2019 12:22 PM AST (SGOT) 23 06/03/2019 12:22 PM    Alk. phosphatase 60 06/03/2019 12:22 PM    Protein, total 6.2 06/03/2019 12:22 PM    Albumin 4.2 06/03/2019 12:22 PM    Globulin 4.1 (H) 09/21/2018 03:37 PM    A-G Ratio 2.1 06/03/2019 12:22 PM    ALT (SGPT) 23 06/03/2019 12:22 PM         ASSESSMENT    ICD-10-CM ICD-9-CM    1. Old cerebrovascular accident (CVA) without late effect Z86.73 V12.54    2. Depression, unspecified depression type F32.9 311    3. Memory loss R41.3 780.93 VITAMIN B12       DISCUSSION  Ms. Zac Montesinos had embolic appearing infarctions in the left MCA territory and right occipital lobe in September 2018. These were suspected to be due to cardioembolism given underlying history of MI, reduced ejection fraction. No A. fib was ever captured. She should continue aspirin and Eliquis along with statin    She has short-term memory loss and may have mild cognitive impairment related to the stroke itself. We will check to be. Deferring any pharmacologic therapy in this regard and will monitor her clinically. Continue Cymbalta as it has helped with her mood and to some extent with itching. I have also psychiatry evaluation regarding obsessive and compulsive. She has been having compulsive itching to the point that created a bare spot on her scalp. Is a mild benign tremor in her left hand  We will continue preop and will see her back in 6 months    Nannette Hinton MD  Diplomate, American Board of Psychiatry & Neurology (Neurology)  [place] Board of Psychiatry & Neurology (Clinical Neurophysiology)  Diplomate, American Board of Electrodiagnostic Medicine    This note will not be viewable in [address]

## 2019-07-10 LAB — VIT B12 SERPL-MCNC: 415 PG/ML (ref 232–1245)

## 2019-09-05 ENCOUNTER — HOSPITAL ENCOUNTER (OUTPATIENT)
Dept: GENERAL RADIOLOGY | Age: 84
Discharge: HOME | End: 2019-09-05
Attending: ORTHOPAEDIC SURGERY
Payer: MEDICARE

## 2019-09-05 DIAGNOSIS — M16.11 PRIMARY OSTEOARTHRITIS OF RIGHT HIP: ICD-10-CM

## 2019-09-05 PROCEDURE — 74011636320 HC RX REV CODE- 636/320: Performed by: RADIOLOGY

## 2019-09-05 PROCEDURE — 74011250636 HC RX REV CODE- 250/636

## 2019-09-05 PROCEDURE — 20610 DRAIN/INJ JOINT/BURSA W/O US: CPT

## 2019-09-05 PROCEDURE — 74011000250 HC RX REV CODE- 250: Performed by: RADIOLOGY

## 2019-09-05 PROCEDURE — 74011250636 HC RX REV CODE- 250/636: Performed by: RADIOLOGY

## 2019-09-05 RX ORDER — SODIUM BICARBONATE 42 MG/ML
1 INJECTION, SOLUTION INTRAVENOUS
Status: DISCONTINUED | OUTPATIENT
Start: 2019-09-05 | End: 2019-09-06 | Stop reason: HOSPADM

## 2019-09-05 RX ORDER — TRIAMCINOLONE ACETONIDE 40 MG/ML
40 INJECTION, SUSPENSION INTRA-ARTICULAR; INTRAMUSCULAR
Status: COMPLETED | OUTPATIENT
Start: 2019-09-05 | End: 2019-09-05

## 2019-09-05 RX ORDER — BUPIVACAINE HYDROCHLORIDE 5 MG/ML
10 INJECTION, SOLUTION EPIDURAL; INTRACAUDAL
Status: COMPLETED | OUTPATIENT
Start: 2019-09-05 | End: 2019-09-05

## 2019-09-05 RX ORDER — LIDOCAINE HYDROCHLORIDE 10 MG/ML
INJECTION, SOLUTION EPIDURAL; INFILTRATION; INTRACAUDAL; PERINEURAL
Status: COMPLETED
Start: 2019-09-05 | End: 2019-09-05

## 2019-09-05 RX ADMIN — LIDOCAINE HYDROCHLORIDE 5 ML: 10 INJECTION, SOLUTION EPIDURAL; INFILTRATION; INTRACAUDAL; PERINEURAL at 14:35

## 2019-09-05 RX ADMIN — BUPIVACAINE HYDROCHLORIDE 50 MG: 5 INJECTION, SOLUTION EPIDURAL; INTRACAUDAL; PERINEURAL at 14:35

## 2019-09-05 RX ADMIN — IOHEXOL 20 ML: 180 INJECTION INTRAVENOUS at 14:35

## 2019-09-05 RX ADMIN — TRIAMCINOLONE ACETONIDE 40 MG: 40 INJECTION, SUSPENSION INTRA-ARTICULAR; INTRAMUSCULAR at 14:35

## 2020-07-16 PROBLEM — I42.9 CARDIOMYOPATHY (HCC): Chronic | Status: ACTIVE | Noted: 2020-07-14

## 2020-07-16 PROBLEM — I65.23 BILATERAL CAROTID ARTERY STENOSIS: Chronic | Status: ACTIVE | Noted: 2018-09-22

## 2020-09-28 ENCOUNTER — HOSPITAL ENCOUNTER (OUTPATIENT)
Dept: PREADMISSION TESTING | Age: 85
Discharge: HOME | End: 2020-09-28
Payer: MEDICARE

## 2020-09-28 VITALS
RESPIRATION RATE: 18 BRPM | DIASTOLIC BLOOD PRESSURE: 67 MMHG | WEIGHT: 176.81 LBS | HEIGHT: 60 IN | TEMPERATURE: 98.2 F | BODY MASS INDEX: 34.71 KG/M2 | HEART RATE: 98 BPM | SYSTOLIC BLOOD PRESSURE: 91 MMHG

## 2020-09-28 LAB
ANION GAP SERPL CALC-SCNC: 10 MMOL/L (ref 5–15)
APPEARANCE UR: CLEAR
BACTERIA URNS QL MICRO: NEGATIVE /HPF
BILIRUB UR QL: NEGATIVE
BUN SERPL-MCNC: 27 MG/DL (ref 6–20)
BUN/CREAT SERPL: 18 (ref 12–20)
CALCIUM SERPL-MCNC: 9.5 MG/DL (ref 8.5–10.1)
CHLORIDE SERPL-SCNC: 100 MMOL/L (ref 97–108)
CO2 SERPL-SCNC: 29 MMOL/L (ref 21–32)
COLOR UR: ABNORMAL
CREAT SERPL-MCNC: 1.47 MG/DL (ref 0.55–1.02)
EPITH CASTS URNS QL MICRO: ABNORMAL /LPF
ERYTHROCYTE [DISTWIDTH] IN BLOOD BY AUTOMATED COUNT: 14.9 % (ref 11.5–14.5)
EST. AVERAGE GLUCOSE BLD GHB EST-MCNC: 128 MG/DL
GLUCOSE SERPL-MCNC: 106 MG/DL (ref 65–100)
GLUCOSE UR STRIP.AUTO-MCNC: NEGATIVE MG/DL
GRAN CASTS URNS QL MICRO: ABNORMAL /LPF
HBA1C MFR BLD: 6.1 % (ref 4–5.6)
HCT VFR BLD AUTO: 39.8 % (ref 35–47)
HGB BLD-MCNC: 11.9 G/DL (ref 11.5–16)
HGB UR QL STRIP: NEGATIVE
HYALINE CASTS URNS QL MICRO: ABNORMAL /LPF (ref 0–5)
INR PPP: 1.1 (ref 0.9–1.1)
KETONES UR QL STRIP.AUTO: NEGATIVE MG/DL
LEUKOCYTE ESTERASE UR QL STRIP.AUTO: ABNORMAL
MCH RBC QN AUTO: 30.2 PG (ref 26–34)
MCHC RBC AUTO-ENTMCNC: 29.9 G/DL (ref 30–36.5)
MCV RBC AUTO: 101 FL (ref 80–99)
NITRITE UR QL STRIP.AUTO: NEGATIVE
NRBC # BLD: 0 K/UL (ref 0–0.01)
NRBC BLD-RTO: 0 PER 100 WBC
PH UR STRIP: 5 [PH] (ref 5–8)
PLATELET # BLD AUTO: 423 K/UL (ref 150–400)
PMV BLD AUTO: 10.4 FL (ref 8.9–12.9)
POTASSIUM SERPL-SCNC: 4.3 MMOL/L (ref 3.5–5.1)
PROT UR STRIP-MCNC: NEGATIVE MG/DL
PROTHROMBIN TIME: 11.2 SEC (ref 9–11.1)
RBC # BLD AUTO: 3.94 M/UL (ref 3.8–5.2)
RBC #/AREA URNS HPF: ABNORMAL /HPF (ref 0–5)
SODIUM SERPL-SCNC: 139 MMOL/L (ref 136–145)
SP GR UR REFRACTOMETRY: 1.01 (ref 1–1.03)
UA: UC IF INDICATED,UAUC: ABNORMAL
UROBILINOGEN UR QL STRIP.AUTO: 0.2 EU/DL (ref 0.2–1)
WBC # BLD AUTO: 11.2 K/UL (ref 3.6–11)
WBC URNS QL MICRO: ABNORMAL /HPF (ref 0–4)

## 2020-09-28 PROCEDURE — 83036 HEMOGLOBIN GLYCOSYLATED A1C: CPT

## 2020-09-28 PROCEDURE — 80048 BASIC METABOLIC PNL TOTAL CA: CPT

## 2020-09-28 PROCEDURE — 81001 URINALYSIS AUTO W/SCOPE: CPT

## 2020-09-28 PROCEDURE — 85610 PROTHROMBIN TIME: CPT

## 2020-09-28 PROCEDURE — 85027 COMPLETE CBC AUTOMATED: CPT

## 2020-09-28 PROCEDURE — 86923 COMPATIBILITY TEST ELECTRIC: CPT

## 2020-09-28 PROCEDURE — 36415 COLL VENOUS BLD VENIPUNCTURE: CPT

## 2020-09-28 PROCEDURE — 86900 BLOOD TYPING SEROLOGIC ABO: CPT

## 2020-09-28 RX ORDER — ASPIRIN 81 MG
1 TABLET, DELAYED RELEASE (ENTERIC COATED) ORAL DAILY
COMMUNITY

## 2020-09-28 RX ORDER — SODIUM CHLORIDE 0.65 %
2 DROPS NASAL
COMMUNITY

## 2020-09-28 RX ORDER — POTASSIUM CHLORIDE 750 MG/1
10 TABLET, FILM COATED, EXTENDED RELEASE ORAL DAILY
COMMUNITY

## 2020-09-28 RX ORDER — CLINDAMYCIN HYDROCHLORIDE 300 MG/1
600 CAPSULE ORAL 3 TIMES DAILY
Status: ON HOLD | COMMUNITY
End: 2020-10-05

## 2020-09-28 RX ORDER — DICLOFENAC SODIUM 10 MG/G
GEL TOPICAL 2 TIMES DAILY
COMMUNITY
End: 2020-10-09

## 2020-09-28 RX ORDER — CLINDAMYCIN PHOSPHATE 10 UG/ML
LOTION TOPICAL
COMMUNITY

## 2020-09-28 RX ORDER — TRAMADOL HYDROCHLORIDE 50 MG/1
50 TABLET ORAL
COMMUNITY
End: 2020-10-09

## 2020-09-28 NOTE — PERIOP NOTES
PAT Nurse Practitioner   Pre-Operative Chart Review/Assessment:-ORTHOPEDIC                Patient Name:  Veena Song                                                           Age:   80 y.o.    :  1933     Today's Date:  2020     Date of PAT:   2020      Date of Surgery:    10/5/2020      Procedure(s):  Right Total Hip Arthroplasty     Surgeon:   Dr. Marcie Read                       PLAN:      1)  Medical Clearance:  Dr. Reny Sosa      2)  Cardiac Clearance: Followed by Dr. Chad Max. Cleared on 20. 3)  Diabetic Treatment Consult:  Not indicated. A1c-6.1      4)  Sleep Apnea evaluation:   EDISON score of 5. Pt reports loud snoring that can be heard through a closed door, daytime fatigue and witnessed pauses in breathing. Pt refusing sleep study referral at this time.       5) Treatment for MRSA/Staph Aureus:  Neg      6) Additional Concerns:  METs <4, HTN, CHF (EF 30-35%), CAD w/ stents                Vital Signs:         Vitals:    20 1506 20 1538   BP:  91/67   Pulse:  98   Resp:  18   Temp:  98.2 °F (36.8 °C)   Weight: 80.2 kg (176 lb 12.9 oz)    Height: 5' (1.524 m)             ____________________________________________  PAST MEDICAL HISTORY  Past Medical History:   Diagnosis Date    Arrhythmia     \"EXTRA BEAT EVERY NOW AND THEN\"    Breast cancer ([address]     lumpectomy, RADIATION    Cancer [facility]     dcis left breast 2002    Cancer [facility])     SKIN CANCER    Cardiomyopathy ([address]     Chronic pain     Coronary artery disease involving native coronary artery of native heart without angina pectoris 2016    multiple stents    CVA (cerebral vascular accident) ([address] 2017    Dyslipidemia     Heart failure ([address]     CHF    High cholesterol     Hives of unknown origin     Hypertension     Local neurodermatitis     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     Tophaceous gout 2017    Venous insufficiency 2011 ____________________________________________  PAST SURGICAL HISTORY  Past Surgical History:   Procedure Laterality Date    CARDIAC SURG PROCEDURE UNLIST  2015    STENT PLACEMENTS X2- \"LOTS OF STENTS\"    HX BREAST BIOPSY Left 1980`S    HX BREAST LUMPECTOMY  1988    dcis lelf breast    HX CATARACT REMOVAL Bilateral 1980`S    HX COLONOSCOPY      HX CORONARY STENT PLACEMENT  2015    X6    HX GI      COLONOSCOPY    HX HYSTERECTOMY  1960    HX ORTHOPAEDIC      RIGHT TKR    HX OTHER SURGICAL      ORAL    HX OTHER SURGICAL  07/2018    SQUAMOUS CELL CA REMOVED FROM RIGHT FOREARM     TOTAL HIP ARTHROPLASTY Left 2018    VASCULAR SURGERY PROCEDURE UNLIST      varicose veins      ____________________________________________  HOME MEDICATIONS  Current Outpatient Medications   Medication Sig    diclofenac (VOLTAREN) 1 % gel Apply  to affected area two (2) times a day.  potassium chloride SR (KLOR-CON 10) 10 mEq tablet Take 10 mEq by mouth daily.  sodium chloride ([place] Saline) 0.65 % drop 2 Drops three (3) times daily as needed for Congestion.  multivitamin,tx-iron-ca-min (Thera-M) 27-0.4 mg tab Take 1 Tab by mouth daily.  traMADoL (ULTRAM) 50 mg tablet Take 50 mg by mouth three (3) times daily as needed for Pain.  clindamycin (CLEOCIN) 300 mg capsule Take 600 mg by mouth three (3) times daily. 1 HR PRIOR TO DENTAL WORK    clindamycin (CLEOCIN T) 1 % lotion Apply  to affected area two (2) times daily as needed. use thin film on affected area    doxepin (SINEQUAN) 10 mg capsule Take 1 Cap by mouth nightly.  fluticasone propionate (FLONASE ALLERGY RELIEF NA) 1 [place] by Nasal route daily.  DULoxetine (CYMBALTA) 60 mg capsule Take 1 Cap by mouth daily.  lidocaine (LIDODERM) 5 % Apply patch to the affected area for 12 hours a day and remove for 12 hours a day.  furosemide (LASIX) 20 mg tablet Take 1 Tab by mouth daily.  (Patient taking differently: Take 40 mg by mouth daily.)    apixaban (ELIQUIS) 5 mg tablet Take 1 Tab by mouth two (2) times a day.  acetaminophen (TYLENOL) 500 mg tablet Take 650 mg by mouth three (3) times daily as needed for Pain.  cholecalciferol, vitamin D3, (VITAMIN D3 PO) Take 1 Tab by mouth daily. Unsure of dose    cetirizine (ZYRTEC) 10 mg tablet Take 10 mg by mouth daily.  metoprolol succinate (TOPROL-XL) 50 mg XL tablet daily. HOLD FOR BP <100 OR HR <60, AND NOTIFY MD    nitroglycerin (NITROSTAT) 0.4 mg SL tablet every five (5) minutes as needed.  aspirin delayed-release 81 mg tablet Take 81 mg by mouth daily. FOR CHF    allopurinol (ZYLOPRIM) 300 mg tablet Take 1/2 tablet by mouth daily. For tophaceous gout    atorvastatin (LIPITOR) 20 mg tablet Take 20 mg by mouth nightly.  lisinopril (PRINIVIL, ZESTRIL) 10 mg tablet Take 10 mg by mouth daily.      No current facility-administered medications for this encounter.       ____________________________________________  ALLERGIES  Allergies   Allergen Reactions    Amoxicillin-Pot Clavulanate Nausea and Vomiting    Cephalexin Nausea and Vomiting    Doxycycline Nausea and Vomiting    Neomycin-Polymyxin-Hc Nausea and Vomiting    Poison Oak Extract Rash    Rocephin [Ceftriaxone] Nausea and Vomiting    Sulfamethoxazole-Trimethoprim Nausea and Vomiting      ____________________________________________  SOCIAL HISTORY  Social History     Tobacco Use    Smoking status: Never Smoker    Smokeless tobacco: Never Used   Substance Use Topics    Alcohol use: No      ____________________________________________        Labs:     Hospital Outpatient Visit on 09/28/2020   Component Date Value Ref Range Status    Sodium 09/28/2020 139  136 - 145 mmol/L Final    Potassium 09/28/2020 4.3  3.5 - 5.1 mmol/L Final    Chloride 09/28/2020 100  97 - 108 mmol/L Final    CO2 09/28/2020 29  21 - 32 mmol/L Final    Anion gap 09/28/2020 10  5 - 15 mmol/L Final    Glucose 09/28/2020 106* 65 - 100 mg/dL Final    BUN 09/28/2020 27* 6 - 20 MG/DL Final    Creatinine 09/28/2020 1.47* 0.55 - 1.02 MG/DL Final    BUN/Creatinine ratio 09/28/2020 18  12 - 20   Final    GFR est AA 09/28/2020 41* >60 ml/min/1.73m2 Final    GFR est non-AA 09/28/2020 34* >60 ml/min/1.73m2 Final    Estimated GFR is calculated using the IDMS-traceable Modification of Diet in Renal Disease (MDRD) Study equation, reported for both  Americans (GFRAA) and non- Americans (GFRNA), and normalized to 1.73m2 body surface area. The physician must decide which value applies to the patient.  Calcium 09/28/2020 9.5  8.5 - 10.1 MG/DL Final    WBC 09/28/2020 11.2* 3.6 - 11.0 K/uL Final    RBC 09/28/2020 3.94  3.80 - 5.20 M/uL Final    HGB 09/28/2020 11.9  11.5 - 16.0 g/dL Final    HCT 09/28/2020 39.8  35.0 - 47.0 % Final    MCV 09/28/2020 101.0* 80.0 - 99.0 FL Final    MCH 09/28/2020 30.2  26.0 - 34.0 PG Final    MCHC 09/28/2020 29.9* 30.0 - 36.5 g/dL Final    RDW 09/28/2020 14.9* 11.5 - 14.5 % Final    PLATELET 22/52/9842 257* 150 - 400 K/uL Final    MPV 09/28/2020 10.4  8.9 - 12.9 FL Final    NRBC 09/28/2020 0.0  0  WBC Final    ABSOLUTE NRBC 09/28/2020 0.00  0.00 - 0.01 K/uL Final    Crossmatch Expiration 09/28/2020 10/08/2020   Final    ABO/Rh(D) 09/28/2020 A POSITIVE   Final    Antibody screen 09/28/2020 NEG   Final    INR 09/28/2020 1.1  0.9 - 1.1   Final    A single therapeutic range for Vit K antagonists may not be optimal for all indications - see June, 2008 issue of Chest, American College of Chest Physicians Evidence-Based Clinical Practice Guidelines, 8th Edition.     Prothrombin time 09/28/2020 11.2* 9.0 - 11.1 sec Final    Instrument and technical errors have been ruled out    Color 09/28/2020 YELLOW/STRAW    Final    Color Reference Range: Straw, Yellow or Dark Yellow    Appearance 09/28/2020 CLEAR  CLEAR   Final    Specific gravity 09/28/2020 1.014  1.003 - 1.030   Final    pH (UA) 09/28/2020 5.0  5.0 - 8.0   Final    Protein 09/28/2020 Negative  NEG mg/dL Final    Glucose 09/28/2020 Negative  NEG mg/dL Final    Ketone 09/28/2020 Negative  NEG mg/dL Final    Bilirubin 09/28/2020 Negative  NEG   Final    Blood 09/28/2020 Negative  NEG   Final    Urobilinogen 09/28/2020 0.2  0.2 - 1.0 EU/dL Final    Nitrites 09/28/2020 Negative  NEG   Final    Leukocyte Esterase 09/28/2020 SMALL* NEG   Final    WBC 09/28/2020 5-10  0 - 4 /hpf Final    RBC 09/28/2020 0-5  0 - 5 /hpf Final    Epithelial cells 09/28/2020 FEW  FEW /lpf Final    Epithelial cell category consists of squamous cells and /or transitional urothelial cells. Renal tubular cells, if present, are separately identified as such.  Bacteria 09/28/2020 Negative  NEG /hpf Final    UA:UC IF INDICATED 09/28/2020 CULTURE NOT INDICATED BY UA RESULT  CNI   Final    Hyaline cast 09/28/2020 5-10  0 - 5 /lpf Final    Granular cast 09/28/2020 5-10* NEG /lpf Final    Hemoglobin A1c 09/28/2020 6.1* 4.0 - 5.6 % Final    Comment: NEW METHOD  PLEASE NOTE NEW REFERENCE RANGE  (NOTE)  HbA1C Interpretive Ranges  <5.7              Normal  5.7 - 6.4         Consider Prediabetes  >6.5              Consider Diabetes      Est. average glucose 09/28/2020 128  mg/dL Final    Special Requests: 09/28/2020 NO SPECIAL REQUESTS    Final    Culture result: 09/28/2020 MRSA NOT PRESENT    Final       Skin:     Denies open wounds, cuts, sores, rashes or other areas of concern in PAT assessment.           La Nena Cole NP

## 2020-09-28 NOTE — PERIOP NOTES
PREOPERATIVE INSTRUCTIONS REVIEWED WITH PATIENT. INSTRUCTIONS ON USE OF CHG SOAP. TWO BOTTLES OF CHG SOAP GIVEN. PATIENT GIVEN SSI INFECTIONS SHEET, AS WELL AS HAND WASHING TIPS SHEET. MRSA/MSSA TREATMENT INSTRUCTION SHEET GIVEN WITH AN EXPLANATION TO PATIENT THAT THEY WILL BE NOTIFIED IF TREATMENT INSTRUCTIONS NEED TO BE INITIATED. PATIENT WAS GIVEN THE OPPORTUNITY TO ASK QUESTIONS, BASED ON THE INFORMATION PROVIDED. DIRECTIONS TO BE FAXED TO [facility], PT TOOK SET OF DIRECTIONS MINUS THE PRE OP MEDICATION DIRECTIONS WITH HER. SHE ALSO TOOK 2 BOTTLES CHG SOAP WITH HER. REFERRAL TO [facility] PLACED IN CC FOR DAUGHTER.

## 2020-09-29 LAB
BACTERIA SPEC CULT: NORMAL
BACTERIA SPEC CULT: NORMAL
SERVICE CMNT-IMP: NORMAL

## 2020-09-29 RX ORDER — CEFAZOLIN SODIUM/WATER 2 G/20 ML
2 SYRINGE (ML) INTRAVENOUS ONCE
Status: CANCELLED | OUTPATIENT
Start: 2020-10-05 | End: 2020-10-05

## 2020-09-29 NOTE — PERIOP NOTES
9/29/20 - LABS & EKG FAXED TO DR. DARLING'S OFFICE.  9/29/20 @ 5760 - [employer] VOICE MESSAGE FOR SHIRA, MEDICAL ASSISTANT FOR DR. DARLING IN REFERENCE TO ABNORMAL LABS, WBC=11.2, ALY=928, PT=11.2

## 2020-09-29 NOTE — H&P
Nolberto Bond  : 1933   / Language: English / Race: White  Female      History of Present Illness  The patient is a 80year old female who presents with a complaint of Hip Pain. The onset of the hip pain has been gradual and has been occurring in a persistent pattern for 6 months. The course has been gradually worsening. The hip pain is described as a severe sharp stabbing. The hip pain is described as being located in the hip (right) and low back. The pain is aggravated by walking, prolonged standing and coughing. Relieving factors include rest and elevation of leg. Note for \"Hip Pain\": Very difficult situation.  Patient has pain and limitation of function which is increasing based on her right hip.  I saw her today. Dick Ramírez states that the pain is worsening keeping her from walking without a walker or cane.  Her activity level is quickly declining.  She does have other medical comorbidities. Dick Ramírez was referred in by her physician today for consideration of options for her right hip.  Prior hip injection but it only lasted a short period of time. Problem List/Past Medical   Left lumbar radiculitis (724.4  M54.16)    REVIEW OF SYSTEMS: Systems were reviewed by the provider.    Osteoarthritis of left hip, unspecified osteoarthritis type (715.95  M16.12)    Spondylolisthesis, lumbar region (738.4  M43.16)    Primary osteoarthritis of right hip (715.15  M16.11)    Weight above 97th percentile (V49.89  Z78.9)    Status post total hip replacement, left (V43.64  V37.036)    Primary osteoarthritis of right knee (715.16  M17.11)    Total knee replacement status, right (V43.65  Z96.651)    Avascular necrosis of bone of left hip (733.42  M87.052)    Left hip pain (719.45  M25.552)      Allergies   Augmentin *PENICILLINS*    Bactrim *ANTI-INFECTIVE AGENTS - MISC. *    Doxycycline Hyclate *TETRACYCLINES*    Cephalexin *CEPHALOSPORINS*    Allergies Reconciled      Social History   Caffeine use   Occasionally. Current work status   Part-time. Exercise   cycling. Marital status   . No alcohol use    No drug use    Seat Belt Use   Always uses seat belts. Sun Exposure   Occasionally. Tobacco / smoke exposure   None. Tobacco use   Never smoker. Medication History   Tylenol  (325MG Tablet, Oral) Active. Aspirin Adult Low Strength  (81MG Tablet DR, Oral) Active. Cetirizine HCl  (10MG Tablet, Oral) Active. Deep Sea Nasal Spray  (0.65% Solution, Nasal) Active. Diclofenac Sodium  (1% Gel, Transdermal) Active. Doxepin HCl  (10MG Capsule, Oral) Active. DULoxetine HCl  (60MG Capsule DR Part, Oral) Active. Eliquis  (5MG Tablet, Oral) Active. Fluticasone Propionate  (50MCG/ACT Suspension, Nasal) Active. Furosemide  (20MG Tablet, Oral) Active. IBU  (400MG Tablet, Oral) Active. Lidocaine  (5% Patch, External) Active. OLANZapine  (5MG Tablet, Oral) Active. Potassium Chloride ER  (10MEQ Tablet ER, Oral) Active. predniSONE  (20MG Tablet, Oral) Active. Skin Prep Wipes  Active. Thera-M  (Oral) Active. Vitamin D3  (25 MCG(1000 UT) Tablet, Oral) Active. Allopurinol  (300MG Tablet, Oral) Active. Lisinopril  (10MG Tablet, Oral) Active. Metoprolol Succinate ER  (50MG Tablet ER 24HR, Oral) Active. traMADol HCl  (50MG Tablet, Oral) Active. Nitroglycerin  (0.4MG Tab Sublingual, Sublingual) Active. Medications Reconciled     Pregnancy / Birth History  Pregnant   No.    Past Surgical History   Breast Mass; Local Excision   bilateral  Cataract Surgery   bilateral  Hysterectomy   non-cancerous: complete  Other Heart Procedures   Stent  Tubal Ligation      Other Problems  Allergic Urticaria    Cerebrovascular Accident    Heart disease    Unspecified Diagnosis    Lumbar spinal stenosis (724.02  M48.061)          Review of Systems   General Not Present- Chills and Fatigue. Skin Not Present- Bruising, Pallor and Skin Color Changes.   Respiratory Not Present- Cough and Difficulty Breathing. Cardiovascular Not Present- Chest Pain, Fainting / Blacking Out and Rapid Heart Rate. Musculoskeletal Present- Joint Pain. Not Present- Decreased Range of Motion and Joint Swelling. Neurological Not Present- Dysesthesia, Paresthesias and Weakness In Extremities. Hematology Not Present- Abnormal Bleeding, Blood Clots and Petechiae. Vitals   Weight: 172 lb   Height: 60 in   Weight was reported by patient. Height was reported by patient. Body Surface Area: 1.75 m²   Body Mass Index: 33.59 kg/m²        Physical Exam   Musculoskeletal  Global Assessment  Examination of related systems reveals - well-developed, well-nourished, in no acute distress, alert and oriented x 3. Right Lower Extremity - Note: Patient's right hip exam today shows severe pain in any attempt at rotation. Hip flexes about 85 degrees. She walks with a rolling walker and has a very antalgic gait. Right lower extremity is sensate and perfused with some pitting edema in her right ankle. Assessment & Plan     Primary osteoarthritis of right hip (715.15  M16.11)  Impression: Decline in function with increasing pain. End-stage DJD per x-ray. Though she is at increased risk other than pain management which we would need to arrange through her primary care physician hip replacement seems the best option. She has not had good relief from intra-articular injections nor would I expect them to last very long. As time goes on her hip symptoms will clearly worsen. It would be beneficial for her to maintain her ambulatory function. Current Plans  Pt Education - How to [address] using Patient Portal and 3rd Party Apps: discussed with patient and provided information. Pt Education - Educational materials were provided.: discussed with patient and provided information. X-RAY EXAM OF HIP COMPLETE min 2 VIEWS (71813) (AP Pelvis and Right Lateral hip views were taken today using Digital Radiography. 2 views.  Severe bone-on-bone with large osteophytes and subchondral cysts.)    REVIEW OF SYSTEMS: Systems were reviewed by the provider.(V49.9)      Weight above 97th percentile (V49.89  Z78.9)    Current Plans  LIFESTYLE EDUCATION REGARDING DIET (30858)

## 2020-10-01 NOTE — PERIOP NOTES
PATIENTS DAUGHTER Martin Price [phone] CALLED AND WAS WORRIED ABOUT GETTING COVID RESULTS OVER TO US PRIOR TO HER SURGERY ON Monday 10/5/20. PT LIVES AT [facility] ASSISTED LIVING ANS THEY TESTED HER TODAY. [id number] [facility] FAX NUMBER [phone] TO [facility] TO FAX US THE COVID RESULTS.

## 2020-10-02 ENCOUNTER — ANESTHESIA EVENT (OUTPATIENT)
Dept: SURGERY | Age: 85
DRG: 470 | End: 2020-10-02
Payer: MEDICARE

## 2020-10-05 ENCOUNTER — ANESTHESIA (OUTPATIENT)
Dept: SURGERY | Age: 85
DRG: 470 | End: 2020-10-05
Payer: MEDICARE

## 2020-10-05 ENCOUNTER — HOSPITAL ENCOUNTER (INPATIENT)
Age: 85
LOS: 3 days | Discharge: NURSING, REHAB OR LONG-TERM CARE | DRG: 470 | End: 2020-10-09
Attending: ORTHOPAEDIC SURGERY | Admitting: ORTHOPAEDIC SURGERY
Payer: MEDICARE

## 2020-10-05 ENCOUNTER — APPOINTMENT (OUTPATIENT)
Dept: GENERAL RADIOLOGY | Age: 85
DRG: 470 | End: 2020-10-05
Attending: ORTHOPAEDIC SURGERY
Payer: MEDICARE

## 2020-10-05 DIAGNOSIS — Z96.641 STATUS POST TOTAL REPLACEMENT OF RIGHT HIP: ICD-10-CM

## 2020-10-05 DIAGNOSIS — M16.11 PRIMARY OSTEOARTHRITIS OF RIGHT HIP: Primary | ICD-10-CM

## 2020-10-05 LAB
GLUCOSE BLD STRIP.AUTO-MCNC: 105 MG/DL (ref 65–100)
GLUCOSE BLD STRIP.AUTO-MCNC: 209 MG/DL (ref 65–100)
SERVICE CMNT-IMP: ABNORMAL
SERVICE CMNT-IMP: ABNORMAL

## 2020-10-05 PROCEDURE — 77030006822 HC BLD SAW SAG BRSM -B: Performed by: ORTHOPAEDIC SURGERY

## 2020-10-05 PROCEDURE — 77030039267 HC ADH SKN EXOFIN S2SG -B: Performed by: ORTHOPAEDIC SURGERY

## 2020-10-05 PROCEDURE — 0SR904A REPLACEMENT OF RIGHT HIP JOINT WITH CERAMIC ON POLYETHYLENE SYNTHETIC SUBSTITUTE, UNCEMENTED, OPEN APPROACH: ICD-10-PCS | Performed by: ORTHOPAEDIC SURGERY

## 2020-10-05 PROCEDURE — 74011250637 HC RX REV CODE- 250/637: Performed by: PHYSICIAN ASSISTANT

## 2020-10-05 PROCEDURE — 77030026438 HC STYL ET INTUB CARD -A: Performed by: NURSE ANESTHETIST, CERTIFIED REGISTERED

## 2020-10-05 PROCEDURE — 74011000258 HC RX REV CODE- 258: Performed by: ORTHOPAEDIC SURGERY

## 2020-10-05 PROCEDURE — 74011250636 HC RX REV CODE- 250/636: Performed by: NURSE ANESTHETIST, CERTIFIED REGISTERED

## 2020-10-05 PROCEDURE — 74011000250 HC RX REV CODE- 250: Performed by: ORTHOPAEDIC SURGERY

## 2020-10-05 PROCEDURE — 74011250636 HC RX REV CODE- 250/636: Performed by: PHYSICIAN ASSISTANT

## 2020-10-05 PROCEDURE — 74011000250 HC RX REV CODE- 250: Performed by: NURSE ANESTHETIST, CERTIFIED REGISTERED

## 2020-10-05 PROCEDURE — 74011250636 HC RX REV CODE- 250/636: Performed by: ANESTHESIOLOGY

## 2020-10-05 PROCEDURE — 77030018836 HC SOL IRR NACL ICUM -A: Performed by: ORTHOPAEDIC SURGERY

## 2020-10-05 PROCEDURE — 76010000172 HC OR TIME 2.5 TO 3 HR INTENSV-TIER 1: Performed by: ORTHOPAEDIC SURGERY

## 2020-10-05 PROCEDURE — 97116 GAIT TRAINING THERAPY: CPT

## 2020-10-05 PROCEDURE — 74011250637 HC RX REV CODE- 250/637: Performed by: ANESTHESIOLOGY

## 2020-10-05 PROCEDURE — 77030008684 HC TU ET CUF COVD -B: Performed by: NURSE ANESTHETIST, CERTIFIED REGISTERED

## 2020-10-05 PROCEDURE — 99218 HC RM OBSERVATION: CPT

## 2020-10-05 PROCEDURE — 97161 PT EVAL LOW COMPLEX 20 MIN: CPT

## 2020-10-05 PROCEDURE — 77030031139 HC SUT VCRL2 J&J -A: Performed by: ORTHOPAEDIC SURGERY

## 2020-10-05 PROCEDURE — 74011000258 HC RX REV CODE- 258: Performed by: NURSE ANESTHETIST, CERTIFIED REGISTERED

## 2020-10-05 PROCEDURE — 77030002933 HC SUT MCRYL J&J -A: Performed by: ORTHOPAEDIC SURGERY

## 2020-10-05 PROCEDURE — 77030013079 HC BLNKT BAIR HGGR 3M -A: Performed by: NURSE ANESTHETIST, CERTIFIED REGISTERED

## 2020-10-05 PROCEDURE — C1776 JOINT DEVICE (IMPLANTABLE): HCPCS | Performed by: ORTHOPAEDIC SURGERY

## 2020-10-05 PROCEDURE — 82962 GLUCOSE BLOOD TEST: CPT

## 2020-10-05 PROCEDURE — C9290 INJ, BUPIVACAINE LIPOSOME: HCPCS | Performed by: ORTHOPAEDIC SURGERY

## 2020-10-05 PROCEDURE — 73501 X-RAY EXAM HIP UNI 1 VIEW: CPT

## 2020-10-05 PROCEDURE — 74011250636 HC RX REV CODE- 250/636: Performed by: ORTHOPAEDIC SURGERY

## 2020-10-05 PROCEDURE — 77030041279 HC DRSG PRMSL AG MDII -B: Performed by: ORTHOPAEDIC SURGERY

## 2020-10-05 PROCEDURE — 2709999900 HC NON-CHARGEABLE SUPPLY: Performed by: ORTHOPAEDIC SURGERY

## 2020-10-05 PROCEDURE — P9045 ALBUMIN (HUMAN), 5%, 250 ML: HCPCS | Performed by: NURSE ANESTHETIST, CERTIFIED REGISTERED

## 2020-10-05 PROCEDURE — 76060000036 HC ANESTHESIA 2.5 TO 3 HR: Performed by: ORTHOPAEDIC SURGERY

## 2020-10-05 PROCEDURE — 77030020788: Performed by: ORTHOPAEDIC SURGERY

## 2020-10-05 PROCEDURE — 76210000006 HC OR PH I REC 0.5 TO 1 HR: Performed by: ORTHOPAEDIC SURGERY

## 2020-10-05 DEVICE — PINNACLE GRIPTION ACETABULAR SHELL SECTOR 52MM OD
Type: IMPLANTABLE DEVICE | Site: HIP | Status: FUNCTIONAL
Brand: PINNACLE GRIPTION

## 2020-10-05 DEVICE — PINNACLE CANCELLOUS BONE SCREW 6.5MM X 55MM
Type: IMPLANTABLE DEVICE | Site: HIP | Status: FUNCTIONAL
Brand: PINNACLE

## 2020-10-05 DEVICE — BIOLOX DELTA CERAMIC FEMORAL HEAD +1.5 36MM DIA 12/14 TAPER
Type: IMPLANTABLE DEVICE | Site: HIP | Status: FUNCTIONAL
Brand: BIOLOX DELTA

## 2020-10-05 DEVICE — HIP H2 TOT ADV OTHER HD IMPL CAPPED SYNTHES: Type: IMPLANTABLE DEVICE | Status: FUNCTIONAL

## 2020-10-05 DEVICE — STEM FEM SZ 5 HIP STD OFFSET CLLRD CEMENTLESS 12/14 TAPR: Type: IMPLANTABLE DEVICE | Site: HIP | Status: FUNCTIONAL

## 2020-10-05 DEVICE — PINNACLE HIP SOLUTIONS ALTRX POLYETHYLENE ACETABULAR LINER NEUTRAL 36MM ID 52MM OD
Type: IMPLANTABLE DEVICE | Site: HIP | Status: FUNCTIONAL
Brand: PINNACLE ALTRX

## 2020-10-05 DEVICE — PINNACLE CANCELLOUS BONE SCREW 6.5MM X 25MM
Type: IMPLANTABLE DEVICE | Site: HIP | Status: FUNCTIONAL
Brand: PINNACLE

## 2020-10-05 DEVICE — APEX HOLE ELIMINATOR - PS
Type: IMPLANTABLE DEVICE | Site: HIP | Status: FUNCTIONAL
Brand: APEX

## 2020-10-05 RX ORDER — SODIUM CHLORIDE 9 MG/ML
125 INJECTION, SOLUTION INTRAVENOUS CONTINUOUS
Status: DISPENSED | OUTPATIENT
Start: 2020-10-05 | End: 2020-10-06

## 2020-10-05 RX ORDER — TRANEXAMIC ACID 100 MG/ML
INJECTION, SOLUTION INTRAVENOUS AS NEEDED
Status: DISCONTINUED | OUTPATIENT
Start: 2020-10-05 | End: 2020-10-05 | Stop reason: HOSPADM

## 2020-10-05 RX ORDER — DOXEPIN HYDROCHLORIDE 10 MG/1
10 CAPSULE ORAL
Status: DISCONTINUED | OUTPATIENT
Start: 2020-10-05 | End: 2020-10-09 | Stop reason: HOSPADM

## 2020-10-05 RX ORDER — SODIUM CHLORIDE 0.9 % (FLUSH) 0.9 %
5-40 SYRINGE (ML) INJECTION AS NEEDED
Status: DISCONTINUED | OUTPATIENT
Start: 2020-10-05 | End: 2020-10-09 | Stop reason: HOSPADM

## 2020-10-05 RX ORDER — FUROSEMIDE 40 MG/1
40 TABLET ORAL DAILY
COMMUNITY

## 2020-10-05 RX ORDER — CEFAZOLIN SODIUM/WATER 2 G/20 ML
2 SYRINGE (ML) INTRAVENOUS ONCE
Status: COMPLETED | OUTPATIENT
Start: 2020-10-06 | End: 2020-10-06

## 2020-10-05 RX ORDER — LIDOCAINE HYDROCHLORIDE 10 MG/ML
0.5 INJECTION, SOLUTION EPIDURAL; INFILTRATION; INTRACAUDAL; PERINEURAL AS NEEDED
Status: DISCONTINUED | OUTPATIENT
Start: 2020-10-05 | End: 2020-10-05 | Stop reason: HOSPADM

## 2020-10-05 RX ORDER — PROPOFOL 10 MG/ML
INJECTION, EMULSION INTRAVENOUS AS NEEDED
Status: DISCONTINUED | OUTPATIENT
Start: 2020-10-05 | End: 2020-10-05 | Stop reason: HOSPADM

## 2020-10-05 RX ORDER — MIDAZOLAM HYDROCHLORIDE 1 MG/ML
1 INJECTION, SOLUTION INTRAMUSCULAR; INTRAVENOUS AS NEEDED
Status: DISCONTINUED | OUTPATIENT
Start: 2020-10-05 | End: 2020-10-05 | Stop reason: HOSPADM

## 2020-10-05 RX ORDER — POTASSIUM CHLORIDE 750 MG/1
10 TABLET, FILM COATED, EXTENDED RELEASE ORAL DAILY
Status: DISCONTINUED | OUTPATIENT
Start: 2020-10-06 | End: 2020-10-09 | Stop reason: HOSPADM

## 2020-10-05 RX ORDER — CLINDAMYCIN PHOSPHATE 11.9 MG/ML
SOLUTION TOPICAL
Status: DISCONTINUED | OUTPATIENT
Start: 2020-10-05 | End: 2020-10-05

## 2020-10-05 RX ORDER — FENTANYL CITRATE 50 UG/ML
INJECTION, SOLUTION INTRAMUSCULAR; INTRAVENOUS AS NEEDED
Status: DISCONTINUED | OUTPATIENT
Start: 2020-10-05 | End: 2020-10-05 | Stop reason: HOSPADM

## 2020-10-05 RX ORDER — VANCOMYCIN/0.9 % SOD CHLORIDE 1.5G/250ML
1500 PLASTIC BAG, INJECTION (ML) INTRAVENOUS EVERY 12 HOURS
Status: DISCONTINUED | OUTPATIENT
Start: 2020-10-05 | End: 2020-10-05 | Stop reason: CLARIF

## 2020-10-05 RX ORDER — HYDROCODONE BITARTRATE AND ACETAMINOPHEN 10; 325 MG/1; MG/1
1 TABLET ORAL
Status: DISCONTINUED | OUTPATIENT
Start: 2020-10-05 | End: 2020-10-07

## 2020-10-05 RX ORDER — POLYETHYLENE GLYCOL 3350 17 G/17G
17 POWDER, FOR SOLUTION ORAL DAILY
Status: DISCONTINUED | OUTPATIENT
Start: 2020-10-06 | End: 2020-10-09 | Stop reason: HOSPADM

## 2020-10-05 RX ORDER — SODIUM CHLORIDE, SODIUM LACTATE, POTASSIUM CHLORIDE, CALCIUM CHLORIDE 600; 310; 30; 20 MG/100ML; MG/100ML; MG/100ML; MG/100ML
125 INJECTION, SOLUTION INTRAVENOUS CONTINUOUS
Status: DISCONTINUED | OUTPATIENT
Start: 2020-10-05 | End: 2020-10-05 | Stop reason: HOSPADM

## 2020-10-05 RX ORDER — ACETAMINOPHEN 325 MG/1
650 TABLET ORAL ONCE
Status: COMPLETED | OUTPATIENT
Start: 2020-10-05 | End: 2020-10-05

## 2020-10-05 RX ORDER — LIDOCAINE HYDROCHLORIDE 20 MG/ML
INJECTION, SOLUTION EPIDURAL; INFILTRATION; INTRACAUDAL; PERINEURAL AS NEEDED
Status: DISCONTINUED | OUTPATIENT
Start: 2020-10-05 | End: 2020-10-05 | Stop reason: HOSPADM

## 2020-10-05 RX ORDER — SODIUM CHLORIDE 0.9 % (FLUSH) 0.9 %
5-40 SYRINGE (ML) INJECTION AS NEEDED
Status: DISCONTINUED | OUTPATIENT
Start: 2020-10-05 | End: 2020-10-05 | Stop reason: HOSPADM

## 2020-10-05 RX ORDER — ASPIRIN 81 MG/1
81 TABLET ORAL DAILY
Status: DISCONTINUED | OUTPATIENT
Start: 2020-10-06 | End: 2020-10-09 | Stop reason: HOSPADM

## 2020-10-05 RX ORDER — MORPHINE SULFATE 10 MG/ML
2 INJECTION, SOLUTION INTRAMUSCULAR; INTRAVENOUS
Status: DISCONTINUED | OUTPATIENT
Start: 2020-10-05 | End: 2020-10-05 | Stop reason: HOSPADM

## 2020-10-05 RX ORDER — LISINOPRIL 10 MG/1
10 TABLET ORAL DAILY
Status: DISCONTINUED | OUTPATIENT
Start: 2020-10-06 | End: 2020-10-09 | Stop reason: HOSPADM

## 2020-10-05 RX ORDER — SODIUM CHLORIDE 0.9 % (FLUSH) 0.9 %
5-40 SYRINGE (ML) INJECTION EVERY 8 HOURS
Status: DISCONTINUED | OUTPATIENT
Start: 2020-10-05 | End: 2020-10-05 | Stop reason: HOSPADM

## 2020-10-05 RX ORDER — NALOXONE HYDROCHLORIDE 0.4 MG/ML
0.4 INJECTION, SOLUTION INTRAMUSCULAR; INTRAVENOUS; SUBCUTANEOUS AS NEEDED
Status: DISCONTINUED | OUTPATIENT
Start: 2020-10-05 | End: 2020-10-09 | Stop reason: HOSPADM

## 2020-10-05 RX ORDER — PHENYLEPHRINE HCL IN 0.9% NACL 0.4MG/10ML
SYRINGE (ML) INTRAVENOUS AS NEEDED
Status: DISCONTINUED | OUTPATIENT
Start: 2020-10-05 | End: 2020-10-05 | Stop reason: HOSPADM

## 2020-10-05 RX ORDER — HYDROCODONE BITARTRATE AND ACETAMINOPHEN 5; 325 MG/1; MG/1
1 TABLET ORAL
Status: DISCONTINUED | OUTPATIENT
Start: 2020-10-05 | End: 2020-10-09 | Stop reason: HOSPADM

## 2020-10-05 RX ORDER — ASPIRIN 81 MG/1
81 TABLET ORAL DAILY
Status: DISCONTINUED | OUTPATIENT
Start: 2020-10-06 | End: 2020-10-05 | Stop reason: SDUPTHER

## 2020-10-05 RX ORDER — ATORVASTATIN CALCIUM 20 MG/1
20 TABLET, FILM COATED ORAL
Status: DISCONTINUED | OUTPATIENT
Start: 2020-10-05 | End: 2020-10-09 | Stop reason: HOSPADM

## 2020-10-05 RX ORDER — ALLOPURINOL 100 MG/1
100 TABLET ORAL DAILY
Status: DISCONTINUED | OUTPATIENT
Start: 2020-10-06 | End: 2020-10-09 | Stop reason: HOSPADM

## 2020-10-05 RX ORDER — MIDAZOLAM HYDROCHLORIDE 1 MG/ML
1 INJECTION, SOLUTION INTRAMUSCULAR; INTRAVENOUS
Status: DISCONTINUED | OUTPATIENT
Start: 2020-10-05 | End: 2020-10-05 | Stop reason: HOSPADM

## 2020-10-05 RX ORDER — FUROSEMIDE 20 MG/1
40 TABLET ORAL DAILY
Status: DISCONTINUED | OUTPATIENT
Start: 2020-10-06 | End: 2020-10-09 | Stop reason: HOSPADM

## 2020-10-05 RX ORDER — FENTANYL CITRATE 50 UG/ML
25 INJECTION, SOLUTION INTRAMUSCULAR; INTRAVENOUS
Status: DISCONTINUED | OUTPATIENT
Start: 2020-10-05 | End: 2020-10-05 | Stop reason: HOSPADM

## 2020-10-05 RX ORDER — ONDANSETRON 2 MG/ML
4 INJECTION INTRAMUSCULAR; INTRAVENOUS AS NEEDED
Status: DISCONTINUED | OUTPATIENT
Start: 2020-10-05 | End: 2020-10-05 | Stop reason: HOSPADM

## 2020-10-05 RX ORDER — HYDROXYZINE HYDROCHLORIDE 10 MG/1
10 TABLET, FILM COATED ORAL
Status: DISCONTINUED | OUTPATIENT
Start: 2020-10-05 | End: 2020-10-09 | Stop reason: HOSPADM

## 2020-10-05 RX ORDER — ONDANSETRON 2 MG/ML
INJECTION INTRAMUSCULAR; INTRAVENOUS AS NEEDED
Status: DISCONTINUED | OUTPATIENT
Start: 2020-10-05 | End: 2020-10-05 | Stop reason: HOSPADM

## 2020-10-05 RX ORDER — METOPROLOL SUCCINATE 50 MG/1
50 TABLET, EXTENDED RELEASE ORAL DAILY
Status: DISCONTINUED | OUTPATIENT
Start: 2020-10-06 | End: 2020-10-09 | Stop reason: HOSPADM

## 2020-10-05 RX ORDER — CETIRIZINE HCL 10 MG
10 TABLET ORAL DAILY
Status: DISCONTINUED | OUTPATIENT
Start: 2020-10-06 | End: 2020-10-09 | Stop reason: HOSPADM

## 2020-10-05 RX ORDER — ROCURONIUM BROMIDE 10 MG/ML
INJECTION, SOLUTION INTRAVENOUS AS NEEDED
Status: DISCONTINUED | OUTPATIENT
Start: 2020-10-05 | End: 2020-10-05 | Stop reason: HOSPADM

## 2020-10-05 RX ORDER — ONDANSETRON 2 MG/ML
4 INJECTION INTRAMUSCULAR; INTRAVENOUS
Status: ACTIVE | OUTPATIENT
Start: 2020-10-05 | End: 2020-10-07

## 2020-10-05 RX ORDER — ACETAMINOPHEN 500 MG
1000 TABLET ORAL EVERY 6 HOURS
Status: DISCONTINUED | OUTPATIENT
Start: 2020-10-05 | End: 2020-10-05

## 2020-10-05 RX ORDER — FLUTICASONE PROPIONATE 50 MCG
2 SPRAY, SUSPENSION (ML) NASAL DAILY PRN
Status: DISCONTINUED | OUTPATIENT
Start: 2020-10-05 | End: 2020-10-09 | Stop reason: HOSPADM

## 2020-10-05 RX ORDER — DULOXETIN HYDROCHLORIDE 60 MG/1
60 CAPSULE, DELAYED RELEASE ORAL DAILY
Status: DISCONTINUED | OUTPATIENT
Start: 2020-10-06 | End: 2020-10-09 | Stop reason: HOSPADM

## 2020-10-05 RX ORDER — DEXTROSE, SODIUM CHLORIDE, SODIUM LACTATE, POTASSIUM CHLORIDE, AND CALCIUM CHLORIDE 5; .6; .31; .03; .02 G/100ML; G/100ML; G/100ML; G/100ML; G/100ML
125 INJECTION, SOLUTION INTRAVENOUS CONTINUOUS
Status: DISCONTINUED | OUTPATIENT
Start: 2020-10-05 | End: 2020-10-05 | Stop reason: HOSPADM

## 2020-10-05 RX ORDER — DEXAMETHASONE SODIUM PHOSPHATE 4 MG/ML
INJECTION, SOLUTION INTRA-ARTICULAR; INTRALESIONAL; INTRAMUSCULAR; INTRAVENOUS; SOFT TISSUE AS NEEDED
Status: DISCONTINUED | OUTPATIENT
Start: 2020-10-05 | End: 2020-10-05 | Stop reason: HOSPADM

## 2020-10-05 RX ORDER — ESMOLOL HYDROCHLORIDE 10 MG/ML
INJECTION INTRAVENOUS AS NEEDED
Status: DISCONTINUED | OUTPATIENT
Start: 2020-10-05 | End: 2020-10-05 | Stop reason: HOSPADM

## 2020-10-05 RX ORDER — HYDROMORPHONE HYDROCHLORIDE 1 MG/ML
0.5 INJECTION, SOLUTION INTRAMUSCULAR; INTRAVENOUS; SUBCUTANEOUS
Status: ACTIVE | OUTPATIENT
Start: 2020-10-05 | End: 2020-10-06

## 2020-10-05 RX ORDER — SUCCINYLCHOLINE CHLORIDE 20 MG/ML
INJECTION INTRAMUSCULAR; INTRAVENOUS AS NEEDED
Status: DISCONTINUED | OUTPATIENT
Start: 2020-10-05 | End: 2020-10-05 | Stop reason: HOSPADM

## 2020-10-05 RX ORDER — DIPHENHYDRAMINE HYDROCHLORIDE 50 MG/ML
12.5 INJECTION, SOLUTION INTRAMUSCULAR; INTRAVENOUS AS NEEDED
Status: DISCONTINUED | OUTPATIENT
Start: 2020-10-05 | End: 2020-10-05 | Stop reason: HOSPADM

## 2020-10-05 RX ORDER — NEOSTIGMINE METHYLSULFATE 1 MG/ML
INJECTION, SOLUTION INTRAVENOUS AS NEEDED
Status: DISCONTINUED | OUTPATIENT
Start: 2020-10-05 | End: 2020-10-05 | Stop reason: HOSPADM

## 2020-10-05 RX ORDER — ACETAMINOPHEN 325 MG/1
325 TABLET ORAL EVERY 6 HOURS
Status: DISCONTINUED | OUTPATIENT
Start: 2020-10-05 | End: 2020-10-09 | Stop reason: HOSPADM

## 2020-10-05 RX ORDER — OXYCODONE HYDROCHLORIDE 5 MG/1
5 TABLET ORAL AS NEEDED
Status: DISCONTINUED | OUTPATIENT
Start: 2020-10-05 | End: 2020-10-05 | Stop reason: HOSPADM

## 2020-10-05 RX ORDER — AMOXICILLIN 250 MG
1 CAPSULE ORAL 2 TIMES DAILY
Status: DISCONTINUED | OUTPATIENT
Start: 2020-10-05 | End: 2020-10-09 | Stop reason: HOSPADM

## 2020-10-05 RX ORDER — SODIUM CHLORIDE 0.9 % (FLUSH) 0.9 %
5-40 SYRINGE (ML) INJECTION EVERY 8 HOURS
Status: DISCONTINUED | OUTPATIENT
Start: 2020-10-05 | End: 2020-10-09 | Stop reason: HOSPADM

## 2020-10-05 RX ORDER — NITROGLYCERIN 0.4 MG/1
0.4 TABLET SUBLINGUAL
Status: DISCONTINUED | OUTPATIENT
Start: 2020-10-05 | End: 2020-10-09 | Stop reason: HOSPADM

## 2020-10-05 RX ORDER — FACIAL-BODY WIPES
10 EACH TOPICAL DAILY PRN
Status: DISCONTINUED | OUTPATIENT
Start: 2020-10-07 | End: 2020-10-09 | Stop reason: HOSPADM

## 2020-10-05 RX ORDER — GLYCOPYRROLATE 0.2 MG/ML
INJECTION INTRAMUSCULAR; INTRAVENOUS AS NEEDED
Status: DISCONTINUED | OUTPATIENT
Start: 2020-10-05 | End: 2020-10-05 | Stop reason: HOSPADM

## 2020-10-05 RX ORDER — ALBUMIN HUMAN 50 G/1000ML
SOLUTION INTRAVENOUS AS NEEDED
Status: DISCONTINUED | OUTPATIENT
Start: 2020-10-05 | End: 2020-10-05 | Stop reason: HOSPADM

## 2020-10-05 RX ORDER — EPHEDRINE SULFATE/0.9% NACL/PF 50 MG/5 ML
SYRINGE (ML) INTRAVENOUS AS NEEDED
Status: DISCONTINUED | OUTPATIENT
Start: 2020-10-05 | End: 2020-10-05 | Stop reason: HOSPADM

## 2020-10-05 RX ORDER — FENTANYL CITRATE 50 UG/ML
50 INJECTION, SOLUTION INTRAMUSCULAR; INTRAVENOUS AS NEEDED
Status: DISCONTINUED | OUTPATIENT
Start: 2020-10-05 | End: 2020-10-05 | Stop reason: HOSPADM

## 2020-10-05 RX ADMIN — Medication 80 MCG: at 13:10

## 2020-10-05 RX ADMIN — ROCURONIUM BROMIDE 5 MG: 10 SOLUTION INTRAVENOUS at 12:56

## 2020-10-05 RX ADMIN — ALBUMIN (HUMAN) 250 ML: 12.5 INJECTION, SOLUTION INTRAVENOUS at 14:48

## 2020-10-05 RX ADMIN — PROPOFOL 30 MG: 10 INJECTION, EMULSION INTRAVENOUS at 14:42

## 2020-10-05 RX ADMIN — Medication 10 MG: at 14:47

## 2020-10-05 RX ADMIN — FENTANYL CITRATE 50 MCG: 50 INJECTION, SOLUTION INTRAMUSCULAR; INTRAVENOUS at 14:29

## 2020-10-05 RX ADMIN — HYDROCODONE BITARTRATE AND ACETAMINOPHEN 1 TABLET: 5; 325 TABLET ORAL at 23:40

## 2020-10-05 RX ADMIN — ACETAMINOPHEN 650 MG: 325 TABLET ORAL at 11:32

## 2020-10-05 RX ADMIN — Medication 80 MCG: at 14:47

## 2020-10-05 RX ADMIN — ROCURONIUM BROMIDE 10 MG: 10 SOLUTION INTRAVENOUS at 14:42

## 2020-10-05 RX ADMIN — Medication 40 MCG: at 12:56

## 2020-10-05 RX ADMIN — ROCURONIUM BROMIDE 10 MG: 10 SOLUTION INTRAVENOUS at 13:42

## 2020-10-05 RX ADMIN — Medication 10 MG: at 15:14

## 2020-10-05 RX ADMIN — Medication 3 MG: at 15:25

## 2020-10-05 RX ADMIN — PROPOFOL 50 MG: 10 INJECTION, EMULSION INTRAVENOUS at 12:56

## 2020-10-05 RX ADMIN — PHENYLEPHRINE HYDROCHLORIDE 40 MCG/MIN: 10 INJECTION INTRAVENOUS at 12:56

## 2020-10-05 RX ADMIN — ACETAMINOPHEN 325 MG: 325 TABLET ORAL at 18:43

## 2020-10-05 RX ADMIN — SODIUM CHLORIDE 2 G: 900 INJECTION, SOLUTION INTRAVENOUS at 13:20

## 2020-10-05 RX ADMIN — LIDOCAINE HYDROCHLORIDE 100 MG: 20 INJECTION, SOLUTION EPIDURAL; INFILTRATION; INTRACAUDAL; PERINEURAL at 12:56

## 2020-10-05 RX ADMIN — Medication 120 MCG: at 14:22

## 2020-10-05 RX ADMIN — FENTANYL CITRATE 25 MCG: 50 INJECTION, SOLUTION INTRAMUSCULAR; INTRAVENOUS at 13:42

## 2020-10-05 RX ADMIN — ONDANSETRON HYDROCHLORIDE 4 MG: 2 INJECTION, SOLUTION INTRAMUSCULAR; INTRAVENOUS at 15:14

## 2020-10-05 RX ADMIN — SUCCINYLCHOLINE CHLORIDE 120 MG: 20 INJECTION, SOLUTION INTRAMUSCULAR; INTRAVENOUS at 12:57

## 2020-10-05 RX ADMIN — FENTANYL CITRATE 50 MCG: 50 INJECTION, SOLUTION INTRAMUSCULAR; INTRAVENOUS at 14:42

## 2020-10-05 RX ADMIN — ESMOLOL HYDROCHLORIDE 20 MG: 10 INJECTION, SOLUTION INTRAVENOUS at 15:31

## 2020-10-05 RX ADMIN — ESMOLOL HYDROCHLORIDE 30 MG: 10 INJECTION, SOLUTION INTRAVENOUS at 13:04

## 2020-10-05 RX ADMIN — ESMOLOL HYDROCHLORIDE 30 MG: 10 INJECTION, SOLUTION INTRAVENOUS at 13:02

## 2020-10-05 RX ADMIN — Medication 120 MCG: at 14:58

## 2020-10-05 RX ADMIN — DEXAMETHASONE SODIUM PHOSPHATE 4 MG: 4 INJECTION, SOLUTION INTRAMUSCULAR; INTRAVENOUS at 13:04

## 2020-10-05 RX ADMIN — FENTANYL CITRATE 25 MCG: 50 INJECTION, SOLUTION INTRAMUSCULAR; INTRAVENOUS at 13:04

## 2020-10-05 RX ADMIN — DOCUSATE SODIUM 50MG AND SENNOSIDES 8.6MG 1 TABLET: 8.6; 5 TABLET, FILM COATED ORAL at 18:43

## 2020-10-05 RX ADMIN — FENTANYL CITRATE 50 MCG: 50 INJECTION, SOLUTION INTRAMUSCULAR; INTRAVENOUS at 12:56

## 2020-10-05 RX ADMIN — ATORVASTATIN CALCIUM 20 MG: 20 TABLET, FILM COATED ORAL at 22:40

## 2020-10-05 RX ADMIN — PROPOFOL 35 MG: 10 INJECTION, EMULSION INTRAVENOUS at 12:57

## 2020-10-05 RX ADMIN — ROCURONIUM BROMIDE 35 MG: 10 SOLUTION INTRAVENOUS at 13:02

## 2020-10-05 RX ADMIN — DOXEPIN HYDROCHLORIDE 10 MG: 10 CAPSULE ORAL at 22:40

## 2020-10-05 RX ADMIN — ESMOLOL HYDROCHLORIDE 20 MG: 10 INJECTION, SOLUTION INTRAVENOUS at 15:27

## 2020-10-05 RX ADMIN — MEPERIDINE HYDROCHLORIDE 25 MG: 50 INJECTION INTRAMUSCULAR; INTRAVENOUS; SUBCUTANEOUS at 13:58

## 2020-10-05 RX ADMIN — GLYCOPYRROLATE 0.4 MG: 0.2 INJECTION, SOLUTION INTRAMUSCULAR; INTRAVENOUS at 15:25

## 2020-10-05 RX ADMIN — Medication 10 MG: at 14:58

## 2020-10-05 RX ADMIN — HYDROCODONE BITARTRATE AND ACETAMINOPHEN 1 TABLET: 5; 325 TABLET ORAL at 18:43

## 2020-10-05 RX ADMIN — SODIUM CHLORIDE, SODIUM LACTATE, POTASSIUM CHLORIDE, AND CALCIUM CHLORIDE 125 ML/HR: 600; 310; 30; 20 INJECTION, SOLUTION INTRAVENOUS at 12:01

## 2020-10-05 RX ADMIN — ALBUMIN (HUMAN) 250 ML: 12.5 INJECTION, SOLUTION INTRAVENOUS at 15:10

## 2020-10-05 RX ADMIN — Medication 10 MG: at 14:54

## 2020-10-05 RX ADMIN — SODIUM CHLORIDE 125 ML/HR: 900 INJECTION, SOLUTION INTRAVENOUS at 16:08

## 2020-10-05 NOTE — ANESTHESIA POSTPROCEDURE EVALUATION
Post-Anesthesia Evaluation and Assessment    Patient: Margo Andre MRN: [id number]  SSN: [id number]    YOB: 1933  Age: 80 y.o. Sex: female      I have evaluated the patient and they are stable and ready for discharge from the PACU. Cardiovascular Function/Vital Signs  Visit Vitals  /68   Pulse 99   Temp 36.3 °C (97.3 °F)   Resp 14   Wt 80 kg (176 lb 5.9 oz)   SpO2 100%   BMI 34.44 kg/m²       Patient is status post General anesthesia for Procedure(s):  RIGHT TOTAL HIP ARTHROPLASTY  ANTERIOR APPROACH. Nausea/Vomiting: None    Postoperative hydration reviewed and adequate. Pain:  Pain Scale 1: Visual (10/05/20 1550)  Pain Intensity 1: 0 (10/05/20 1550)   Managed    Neurological Status:   Neuro (WDL): Within Defined Limits (10/05/20 1550)  Neuro  LUE Motor Response: Purposeful (10/05/20 1550)  LLE Motor Response: Purposeful (10/05/20 1550)  RUE Motor Response: Purposeful (10/05/20 1550)  RLE Motor Response: Numbness (10/05/20 1550)   At baseline    Mental Status, Level of Consciousness: Alert and  oriented to person, place, and time    Pulmonary Status:   O2 Device: Nasal cannula (10/05/20 1552)   Adequate oxygenation and airway patent    Complications related to anesthesia: None    Post-anesthesia assessment completed. No concerns    Signed By: Enoc Griffith MD     October 5, 2020              Procedure(s):  RIGHT TOTAL HIP ARTHROPLASTY  ANTERIOR APPROACH. general    <BSHSIANPOST>    INITIAL Post-op Vital signs:   Vitals Value Taken Time   /71 10/5/2020  4:05 PM   Temp 36.3 °C (97.3 °F) 10/5/2020  3:52 PM   Pulse 101 10/5/2020  4:08 PM   Resp 13 10/5/2020  4:08 PM   SpO2 100 % 10/5/2020  4:08 PM   Vitals shown include unvalidated device data.

## 2020-10-05 NOTE — ANESTHESIA PREPROCEDURE EVALUATION
Anesthetic History               Review of Systems / Medical History  Patient summary reviewed, nursing notes reviewed and pertinent labs reviewed    Pulmonary                   Neuro/Psych       CVA  TIA     Cardiovascular    Hypertension      CHF: NYHA Classification II    CAD and cardiac stents      Comments: EF 35-40%   GI/Hepatic/Renal                Endo/Other        Arthritis     Other Findings              Physical Exam    Airway  Mallampati: II  TM Distance: > 6 cm  Neck ROM: normal range of motion   Mouth opening: Normal     Cardiovascular    Rhythm: regular  Rate: normal         Dental  No notable dental hx       Pulmonary  Breath sounds clear to auscultation               Abdominal         Other Findings            Anesthetic Plan    ASA: 3  Anesthesia type: general          Induction: Intravenous  Anesthetic plan and risks discussed with: Patient

## 2020-10-05 NOTE — ROUTINE PROCESS
Patient: Jay Crane MRN: [id number]  SSN: [id number] YOB: 1933  Age: 80 y.o. Sex: female Patient is status post Procedure(s): RIGHT TOTAL HIP ARTHROPLASTY  ANTERIOR APPROACH. Surgeon(s) and Role: Chely Angelo MD - Primary Local/Dose/Irrigation:  80 ML MIXTURE OF 30 ML BUPIVACAINE 0.5% PLAIN, EXPAREL 20 ML, 30 ML NORMAL SALINE Peripheral IV 10/05/20 Anterior; Left Forearm (Active) Dressing/Packing:  Wound Hip Right-Dressing Type: Aquacel;Staples(dermabond prineo, aquacel) (10/05/20 1500) Splint/Cast:  ] Other:  [facility] [facility]

## 2020-10-05 NOTE — OP NOTES
OPERATIVE REPORT  RIGHT TOTAL HIP REPLACEMENT (ANTERIOR APPROACH)    NAME: Hadley Baeza  MRN: [id number]  :  1933  AGE: 80 y.o. DATE OF SURGERY:  10/5/2020    PREOPERATIVE DIAGNOSIS: Severe degenerative joint disease, right hip. POSTOPERATIVE DIAGNOSIS: Severe degenerative joint disease, right hip. PROCEDURES PERFORMED: Right total hip replacement - Anterior approach    SURGEON: Cricket Ray MD.    ASSISTANT: Tc Kowalski PA-C    ANESTHESIA: General    ESTIMATED BLOOD LOSS: 200 mL. DRAINS: None. COMPLICATIONS: None. SPECIMENS REMOVED: None. PRE-OP ANTIBIOTIC: Ancef 2 gram    IMPLANT:   Implant Name Type Inv. Item Serial No.  Lot No. LRB No. Used Action   ELIMINATOR APEX HOLE POSITIVE - SNA  ELIMINATOR APEX HOLE POSITIVE NA [facility] [id number] Right 1 Implanted   CUP ACET [id number] HIP GRIPTION GARRY CEMENTLESS FIX SECT SER - SNA  CUP ACET [id number] HIP GRIPTION GARRY CEMENTLESS FIX SECT SER NA [facility] [id number] Right 1 Implanted   SCREW BNE L55MM DIA6.5MM CANC HIP FULL THRD DOME FOR PINN - SNA  SCREW BNE L55MM DIA6.5MM CANC HIP FULL THRD DOME FOR PINN NA [facility] [id number] Right 1 Implanted   LINER ACET OD52MM ID36MM HIP ALTRX PINN - SNA  LINER ACET OD52MM ID36MM HIP ALTRX PINN NA [facility] [id number] Right 1 Implanted   SCREW BONE FEM CANC 6. [id number] - SNA  SCREW BONE FEM CANC 6. [id number] NA [facility] [id number] Right 1 Implanted   STEM FEM SZ 5 HIP STD OFFSET CLLRD CEMENTLESS 12/14 TAPR - SNA  STEM FEM SZ 5 HIP STD OFFSET CLLRD CEMENTLESS 12/14 TAPR NA [facility] [id number] Right 1 Implanted   HEAD FEM [id number] +1.5MM OFFSET 12/14 TAPR HIP CERAMIC - SNA  HEAD FEM [id number] +1.5MM OFFSET 12/14 TAPR HIP CERAMIC NA [facility] [id number] Right 1 Implanted       INDICATIONS: 80 yrs female  with severe DJD of the right hip.   The patient's right hip has been progressive in terms of symptoms. The patient now has severe activity limitation. The patient has continued with conservative management without adequate relief or improvement of functional limitations. We discussed options and she wished to proceed with right total hip replacement. The patient has continued with conservative management without adequate relief or improvement of functional limitations. DESCRIPTION OF PROCEDURE: Anesthetic was initiated. Preoperative dose of IV Ancef was given. Cartwright catheter was not placed. The right side was confirmed as the operative side, prepped and draped in the usual sterile fashion. Skin was covered with Ioban occlusive dressing. Direct anterior exposure was made to the patient's hip through the sartorius tensor interval. Anterior hip vasculature was cauterized. Retractors were taken out to observe for bleeding and there was none. The capsule was identified, opened and T'd distally. The femoral neck was osteotomized. Femoral head was removed from the acetabulum, which was exposed and soft tissues were removed. The acetabulum was progressively reamed to 51 and a 52 trial shell was impacted with good press-fit. This was removed and a 52 shell was impacted in the acetabulum in 40 degrees of abduction in an anatomic-type anteversion. Bone spurs were removed and 6.5 screws x2 were placed. The polyethylene liner was placed. Femur was positioned and elevated from the wound. The medullary canal was entered. Flexible reamers were utilized as the patient did have a narrowed femoral canal, broached to a size 5. Calcar planed and then trialed. A +0 hip ball was the most appropriate for leg length and tension with a standard offset stem. The hip was dislocated. The anterior greater trochanter was trimmed down to enhance flexion, rotation and stability. The trial was removed and the real stem was impacted. The real hip ball was placed. The hip was reduced.      After copious irrigation, the capsule was closed with #2 Vicryl sutures. I irrigated the skin, subcutaneous and deep wound. I closed the fascia of the tensor fascia tevin with #2 Vicryl sutures. Skin and subcutaneous were irrigated. Soft tissues were infiltrated with local anesthetic. Skin and subcutaneous were closed in a standard fashion. Sterile dressing was applied. There were no complications. No specimen was sent. The procedure was a RIGHT TOTAL HIP REPLACEMENT using a Depuy total hip construct. The patient was transferred to the recovery room in stable condition. Dayanna Barajas PA-C was critical throughout the case to assist with positioning, retraction, placement of the implant,  and closure. There were no other available residents or surgical assistants to assist during this procedure.     Dotti Gottron, MD

## 2020-10-05 NOTE — PERIOP NOTES
TRANSFER - OUT REPORT:    Verbal report given to Janel on Kiarra Das  being transferred to [facility] for routine post - op       Report consisted of patients Situation, Background, Assessment and   Recommendations(SBAR). Time Pre op antibiotic given:1320  Anesthesia Stop time: 9058  Cartwright Present on Transfer to floor:yes  Order for Cartwright on Chart:yes  Discharge Prescriptions with Chart:no    Information from the following report(s) SBAR was reviewed with the receiving nurse. Opportunity for questions and clarification was provided. Is the patient on 02? YES       L/Min 2       Other     Is the patient on a monitor? NO    Is the nurse transporting with the patient? NO    Surgical Waiting Area notified of patient's transfer from PACU?  YES      The following personal items collected during your admission accompanied patient upon transfer:   Dental Appliance: Dental Appliances: None  Vision: Visual Aid: None  Hearing Aid:    Jewelry:    Clothing: Clothing: (belongings and rollator sent to [employer])  Other Valuables:    Valuables sent to safe:

## 2020-10-05 NOTE — PROGRESS NOTES
Ortho Post-Op Note    10/5/2020  5:32 PM    POD:  Day of Surgery  S/P:  Procedure(s):  RIGHT TOTAL HIP ARTHROPLASTY  ANTERIOR APPROACH    Afebrile/VSS, NAD, A&O x 3  Obese  Doing well without complaints of nausea  Pain well controlled  Calves soft/NTTP Bilaterally  Thigh soft. Dressing clean and dry  Moving lower extremities well. Neurocirculatory exam intact and within normal range. Multiple comorbidities- CHF, cardiomyopathy, CVA, stent placement, venous insufficiency, stage III CKD  Lab Results   Component Value Date/Time    HGB 11.9 09/28/2020 03:15 PM    INR 1.1 09/28/2020 03:15 PM     Recent Labs     09/28/20  1515   CREA 1.47*   BUN 27*     Estimated Creatinine Clearance: 25.7 mL/min (A) (by C-G formula based on SCr of 1.47 mg/dL (H)).     PLAN:  DVT prophylaxis: Eliquis 2.5 mg bid  WBAT with PT-mobilization  Pain Control: tylenol, oxycodone  Plan to D/C home with HH/PT when cleared      RISHI Rollins

## 2020-10-05 NOTE — PROGRESS NOTES
Problem: Mobility Impaired (Adult and Pediatric)  Goal: *Acute Goals and Plan of Care (Insert Text)  Description: FUNCTIONAL STATUS PRIOR TO ADMISSION: Patient was modified independent using a rollator and walking only very short distances for functional mobility. HOME SUPPORT PRIOR TO ADMISSION: Patient lived at [facility] living facility where she gets assist with her meals but is able to manage her own mobility and ADLs within her apartment. Physical Therapy Goals  Initiated 10/5/2020    1. Patient will move from supine to sit and sit to supine  in bed with modified independence within 4 days. 2. Patient will perform sit to stand with modified independence within 4 days. 3. Patient will ambulate with modified independence for 150 feet with the least restrictive device within 4 days. 4. Patient will perform TIGRE home exercise program per protocol with modified independence within 4 days. Outcome: Progressing Towards Goal   PHYSICAL THERAPY EVALUATION  Patient: Jay Crane (11 y.o. female)  Date: 10/5/2020  Primary Diagnosis: Primary localized osteoarthritis of right hip [M16.11]  Procedure(s) (LRB):  RIGHT TOTAL HIP ARTHROPLASTY  ANTERIOR APPROACH (Right) Day of Surgery   Precautions:   Fall, WBAT      ASSESSMENT  Based on the objective data described below, the patient presents with decreased mobility compared to baseline after R TIGRE, POD0. She was using a rollator and only walking minimally within her apartment prior to surgery. She has a complex medical history including CHF with EF 35-40%, CVA and previous L TIGRE and R TKA. Daughter reports she also has issues with orthostatic hypotension at times. Today, she was reluctant to mobilize, and she did need max assist for bed mobility. However,  once she was sitting, she was able to maintain her sitting balance and then stood with the walker and was able to take a few steps.   Expect that she will be slow to progress with her mobility and that she will likely need post acute rehab. Patient's daughter reports they would like to look into [facility] for rehab. We will continue to progress her activity as she is able to tolerate. Current Level of Function Impacting Discharge (mobility/balance): max assist for bed mobility, mod assist sit to stand    Functional Outcome Measure: The patient scored Total: 35/100 on the Barthel Index which is indicative of severely impaired ability to care for basic self needs/dependency on others. Other factors to consider for discharge: patient needs to be independent with her mobility to return to her apartment     Patient will benefit from skilled therapy intervention to address the above noted impairments. PLAN :  Recommendations and Planned Interventions: bed mobility training, transfer training, gait training, therapeutic exercises, patient and family training/education, and therapeutic activities      Frequency/Duration: Patient will be followed by physical therapy:  twice daily to address goals. Recommendation for discharge: (in order for the patient to meet his/her long term goals)  Therapy up to 5 days/week in SNF setting    This discharge recommendation:  Has not yet been discussed the attending provider and/or case management    IF patient discharges home will need the following DME: to be determined (TBD)          SUBJECTIVE:   Patient stated It feels sore in the groin, that's all.     OBJECTIVE DATA SUMMARY:   HISTORY:    Past Medical History:   Diagnosis Date    Arrhythmia     \"EXTRA BEAT EVERY NOW AND THEN\"    Breast cancer ([facility] [place] [address] 1997    lumpectomy, RADIATION    Cancer ([facility] [place] [address] 2002    dcis left breast 2002    Cancer [facility])     SKIN CANCER    Cardiomyopathy ([facility] [place] [address]     Chronic pain     Coronary artery disease involving native coronary artery of native heart without angina pectoris 12/16/2016    multiple stents    CVA (cerebral vascular accident) ([facility] [place] [address] 09/13/2017    Dyslipidemia Heart failure (HCC)     CHF    High cholesterol     Hives of unknown origin     Hypertension     Local neurodermatitis     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    Venous insufficiency 5/17/2011     Past Surgical History:   Procedure Laterality Date    CARDIAC SURG PROCEDURE UNLIST  2015    STENT PLACEMENTS X2- \"LOTS OF STENTS\"    HX BREAST BIOPSY Left 1980`S    HX BREAST LUMPECTOMY  1988    dcis lelf breast    HX CATARACT REMOVAL Bilateral 1980`S    HX COLONOSCOPY      HX CORONARY STENT PLACEMENT  2015    X6    HX GI      COLONOSCOPY    HX HYSTERECTOMY  1960    HX ORTHOPAEDIC      RIGHT TKR    HX OTHER SURGICAL      ORAL    HX OTHER SURGICAL  07/2018    SQUAMOUS CELL CA REMOVED FROM RIGHT FOREARM     TOTAL HIP ARTHROPLASTY Left 2018    VASCULAR SURGERY PROCEDURE UNLIST      varicose veins       Personal factors and/or comorbidities impacting plan of care: complex PMHx as noted above    Home Situation  Home Environment: Assisted living  [facility] [address] [facility]  Current DME Used/Available at Home: dave Henry    EXAMINATION/PRESENTATION/DECISION MAKING:   Critical Behavior:              Hearing: Auditory  Auditory Impairment: None  Skin:  postop bandage in place R anterior hip  Edema: none  Range Of Motion:  AROM: Generally decreased, functional(R hip limited by post op pain, but WFL)                       Strength:    Strength: Generally decreased, functional(R hip 2/5 due to post op pain)                    Tone & Sensation:   Tone: Normal              Sensation: Intact               Coordination:  Coordination: Within functional limits  Vision:      Functional Mobility:  Bed Mobility:     Supine to Sit: Maximum assistance  Sit to Supine: Maximum assistance  Scooting: Maximum assistance  Transfers:  Sit to Stand: Moderate assistance; Adaptive equipment; Additional time  Stand to Sit: Minimum assistance; Adaptive equipment; Additional time Balance:   Sitting: Impaired; Without support  Sitting - Static: Occassional;Fair (occasional)  Sitting - Dynamic: Fair (occasional); Occassional  Standing: Impaired; With support  Standing - Static: Fair;Occasional  Standing - Dynamic : Fair;Occasional  Ambulation/Gait Training:  Distance (ft): 3 Feet (ft)  Assistive Device: Gait belt;Walker, rolling  Ambulation - Level of Assistance: Moderate assistance; Adaptive equipment; Additional time        Gait Abnormalities: Antalgic;Decreased step clearance; Step to gait  Right Side Weight Bearing: As tolerated  Left Side Weight Bearing: Full  Base of Support: Widened;Shift to left  Stance: Right decreased  Speed/Juliana: Slow;Shuffled  Step Length: Right shortened;Left shortened  Swing Pattern: Right asymmetrical     Interventions: Safety awareness training;Verbal cues; Tactile cues; Visual/Demos            Stairs: Therapeutic Exercises: Ankle pumps    Functional Measure:  Barthel Index:    Bathin  Bladder: 0(bee)  Bowels: 10  Groomin  Dressin  Feeding: 10  Mobility: 0  Stairs: 0  Toilet Use: 0  Transfer (Bed to Chair and Back): 5  Total: 35/100       The Barthel ADL Index: Guidelines  1. The index should be used as a record of what a patient does, not as a record of what a patient could do. 2. The main aim is to establish degree of independence from any help, physical or verbal, however minor and for whatever reason. 3. The need for supervision renders the patient not independent. 4. A patient's performance should be established using the best available evidence. Asking the patient, friends/relatives and nurses are the usual sources, but direct observation and common sense are also important. However direct testing is not needed. 5. Usually the patient's performance over the preceding 24-48 hours is important, but occasionally longer periods will be relevant.   6. Middle categories imply that the patient supplies over 50 per cent of the effort. 7. Use of aids to be independent is allowed. Tray Sandoval., Barthel, DJorge AW. (8991). Functional evaluation: the Barthel Index. [facility] (14)2. Deondre JOSELO Cristina, Kristan Wolfe, Nano Ramos., [place], [address] (1999). Measuring the change indisability after inpatient rehabilitation; comparison of the responsiveness of the Barthel Index and Functional [place] Measure. Journal of Neurology, Neurosurgery, and Psychiatry, 66(4), 647-656. LES Jesus, OSCAR Greer, & Shavon Carter M.A. (2004.) Assessment of post-stroke quality of life in cost-effectiveness studies: The usefulness of the Barthel Index and the EuroQoL-5D. Quality of Life Research, 15, 482-02        Physical Therapy Evaluation Charge Determination   History Examination Presentation Decision-Making   HIGH Complexity :3+ comorbidities / personal factors will impact the outcome/ POC  MEDIUM Complexity : 3 Standardized tests and measures addressing body structure, function, activity limitation and / or participation in recreation  LOW Complexity : Stable, uncomplicated  LOW Complexity : FOTO score of       Based on the above components, the patient evaluation is determined to be of the following complexity level: LOW     Pain Rating:  Minimal pain in bed and improved with rolled blanket under the knee    Activity Tolerance:   Fair and requires frequent rest breaks  Please refer to the flowsheet for vital signs taken during this treatment. After treatment patient left in no apparent distress:   Supine in bed, Call bell within reach, Caregiver / family present, Side rails x 3, and SCDs in place    COMMUNICATION/EDUCATION:   The patients plan of care was discussed with: Registered nurse and PA . Fall prevention education was provided and the patient/caregiver indicated understanding., Patient/family have participated as able in goal setting and plan of care. , and Patient/family agree to work toward stated goals and plan of care.     Thank you for this referral.  Noelle Rasmussen, PT   Time Calculation: 26 mins

## 2020-10-05 NOTE — H&P
Date of Surgery Update:  Arun Chiu was seen and examined. History and physical has been reviewed. The patient has been examined.  There have been no significant clinical changes since the completion of the originally dated History and Physical.    Signed By: Leti Sosa MD     October 5, 2020 7:26 AM

## 2020-10-06 PROBLEM — M16.11 DEGENERATIVE JOINT DISEASE OF RIGHT HIP: Status: ACTIVE | Noted: 2020-10-06

## 2020-10-06 LAB
ANION GAP SERPL CALC-SCNC: 7 MMOL/L (ref 5–15)
BUN SERPL-MCNC: 20 MG/DL (ref 6–20)
BUN/CREAT SERPL: 17 (ref 12–20)
CALCIUM SERPL-MCNC: 8.8 MG/DL (ref 8.5–10.1)
CHLORIDE SERPL-SCNC: 108 MMOL/L (ref 97–108)
CO2 SERPL-SCNC: 25 MMOL/L (ref 21–32)
CREAT SERPL-MCNC: 1.2 MG/DL (ref 0.55–1.02)
GLUCOSE BLD STRIP.AUTO-MCNC: 123 MG/DL (ref 65–100)
GLUCOSE SERPL-MCNC: 132 MG/DL (ref 65–100)
HGB BLD-MCNC: 7.8 G/DL (ref 11.5–16)
HISTORY CHECKED?,CKHIST: NORMAL
POTASSIUM SERPL-SCNC: 4.4 MMOL/L (ref 3.5–5.1)
SERVICE CMNT-IMP: ABNORMAL
SODIUM SERPL-SCNC: 140 MMOL/L (ref 136–145)

## 2020-10-06 PROCEDURE — 97110 THERAPEUTIC EXERCISES: CPT

## 2020-10-06 PROCEDURE — 74011000250 HC RX REV CODE- 250: Performed by: PHYSICIAN ASSISTANT

## 2020-10-06 PROCEDURE — 74011250637 HC RX REV CODE- 250/637: Performed by: PHYSICIAN ASSISTANT

## 2020-10-06 PROCEDURE — 36415 COLL VENOUS BLD VENIPUNCTURE: CPT

## 2020-10-06 PROCEDURE — 36430 TRANSFUSION BLD/BLD COMPNT: CPT

## 2020-10-06 PROCEDURE — P9016 RBC LEUKOCYTES REDUCED: HCPCS

## 2020-10-06 PROCEDURE — 80048 BASIC METABOLIC PNL TOTAL CA: CPT

## 2020-10-06 PROCEDURE — 30233N1 TRANSFUSION OF NONAUTOLOGOUS RED BLOOD CELLS INTO PERIPHERAL VEIN, PERCUTANEOUS APPROACH: ICD-10-PCS | Performed by: ORTHOPAEDIC SURGERY

## 2020-10-06 PROCEDURE — 85018 HEMOGLOBIN: CPT

## 2020-10-06 PROCEDURE — 97165 OT EVAL LOW COMPLEX 30 MIN: CPT

## 2020-10-06 PROCEDURE — 74011250636 HC RX REV CODE- 250/636: Performed by: PHYSICIAN ASSISTANT

## 2020-10-06 PROCEDURE — 97535 SELF CARE MNGMENT TRAINING: CPT

## 2020-10-06 PROCEDURE — 65270000029 HC RM PRIVATE

## 2020-10-06 PROCEDURE — 82962 GLUCOSE BLOOD TEST: CPT

## 2020-10-06 PROCEDURE — 99218 HC RM OBSERVATION: CPT

## 2020-10-06 RX ORDER — SODIUM CHLORIDE 9 MG/ML
250 INJECTION, SOLUTION INTRAVENOUS AS NEEDED
Status: DISCONTINUED | OUTPATIENT
Start: 2020-10-06 | End: 2020-10-09 | Stop reason: HOSPADM

## 2020-10-06 RX ADMIN — DOCUSATE SODIUM 50MG AND SENNOSIDES 8.6MG 1 TABLET: 8.6; 5 TABLET, FILM COATED ORAL at 09:27

## 2020-10-06 RX ADMIN — DOCUSATE SODIUM 50MG AND SENNOSIDES 8.6MG 1 TABLET: 8.6; 5 TABLET, FILM COATED ORAL at 17:54

## 2020-10-06 RX ADMIN — CETIRIZINE HYDROCHLORIDE 10 MG: 10 TABLET, FILM COATED ORAL at 09:27

## 2020-10-06 RX ADMIN — Medication 10 ML: at 14:10

## 2020-10-06 RX ADMIN — Medication 10 ML: at 06:34

## 2020-10-06 RX ADMIN — ALLOPURINOL 100 MG: 100 TABLET ORAL at 09:27

## 2020-10-06 RX ADMIN — ACETAMINOPHEN 325 MG: 325 TABLET ORAL at 17:54

## 2020-10-06 RX ADMIN — SODIUM CHLORIDE 125 ML/HR: 900 INJECTION, SOLUTION INTRAVENOUS at 06:29

## 2020-10-06 RX ADMIN — POTASSIUM CHLORIDE 10 MEQ: 750 TABLET, FILM COATED, EXTENDED RELEASE ORAL at 09:27

## 2020-10-06 RX ADMIN — METOPROLOL SUCCINATE 50 MG: 50 TABLET, EXTENDED RELEASE ORAL at 09:27

## 2020-10-06 RX ADMIN — ACETAMINOPHEN 325 MG: 325 TABLET ORAL at 11:31

## 2020-10-06 RX ADMIN — ASPIRIN 81 MG: 81 TABLET, COATED ORAL at 09:27

## 2020-10-06 RX ADMIN — APIXABAN 2.5 MG: 2.5 TABLET, FILM COATED ORAL at 06:27

## 2020-10-06 RX ADMIN — HYDROCODONE BITARTRATE AND ACETAMINOPHEN 1 TABLET: 5; 325 TABLET ORAL at 11:13

## 2020-10-06 RX ADMIN — ACETAMINOPHEN 325 MG: 325 TABLET ORAL at 06:27

## 2020-10-06 RX ADMIN — APIXABAN 2.5 MG: 2.5 TABLET, FILM COATED ORAL at 18:24

## 2020-10-06 RX ADMIN — DULOXETINE 60 MG: 60 CAPSULE, DELAYED RELEASE ORAL at 09:26

## 2020-10-06 RX ADMIN — CEFAZOLIN SODIUM 2 G: 300 INJECTION, POWDER, LYOPHILIZED, FOR SOLUTION INTRAVENOUS at 01:43

## 2020-10-06 RX ADMIN — POLYETHYLENE GLYCOL 3350 17 G: 17 POWDER, FOR SOLUTION ORAL at 09:28

## 2020-10-06 NOTE — PROGRESS NOTES
Problem: Mobility Impaired (Adult and Pediatric)  Goal: *Acute Goals and Plan of Care (Insert Text)  Description: FUNCTIONAL STATUS PRIOR TO ADMISSION: Patient was modified independent using a rollator and walking only very short distances for functional mobility. HOME SUPPORT PRIOR TO ADMISSION: Patient lived at [facility] living facility where she gets assist with her meals but is able to manage her own mobility and ADLs within her apartment. Physical Therapy Goals  Initiated 10/5/2020    1. Patient will move from supine to sit and sit to supine  in bed with modified independence within 4 days. 2. Patient will perform sit to stand with modified independence within 4 days. 3. Patient will ambulate with modified independence for 150 feet with the least restrictive device within 4 days. 4. Patient will perform TIGRE home exercise program per protocol with modified independence within 4 days. Outcome: Progressing Towards Goal  PHYSICAL THERAPY TREATMENT  Patient: Shayla Gregg (48 y.o. female)  Date: 10/6/2020  Diagnosis: Primary localized osteoarthritis of right hip [M16.11]   <principal problem not specified>  Procedure(s) (LRB):  RIGHT TOTAL HIP ARTHROPLASTY  ANTERIOR APPROACH (Right) 1 Day Post-Op  Precautions: Fall, WBAT  Chart, physical therapy assessment, plan of care and goals were reviewed. ASSESSMENT  Patient continues with skilled PT services. Activity limited to in bed exercises d/t low BP at rest. Further EOB/OOB mobility deferred for safety. Pt denied dizziness at rest but stated \" I will be if I sit up\". Pt completed supine bed exercises w/ active assist progressing to active ROM and frequen verbal/tactile cuing for technique. Pt remained seated upright in bed w/ HOB elevated (high chambers's position) w/ OT present. Will follow up tomorrow morning for EOB/OOb mobility as able and appropriate.      See BP for PT [id number]  Patient Vitals for the past 4 hrs:   Temp Pulse Resp BP SpO2   10/06/20 1437  100  96/73    10/06/20 1428  98  94/69    10/06/20 1421  100  (!) 83/63    10/06/20 1411 99.2 °F (37.3 °C) 98 16 (!) 91/54 98 %           Current Level of Function Impacting Discharge (mobility/balance): Mod-Max A per PT eval    Other factors to consider for discharge: from jail. Mobility below baseline. PLAN :  Patient continues to benefit from skilled intervention to address the above impairments. Continue treatment per established plan of care. to address goals. Recommendation for discharge: (in order for the patient to meet his/her long term goals)  Therapy up to 5 days/week in SNF setting    This discharge recommendation:  Has been made in collaboration with the attending provider and/or case management    IF patient discharges home will need the following DME: patient owns DME required for discharge       SUBJECTIVE:   Patient stated This one was the difficult one.  (referring to R knee)    OBJECTIVE DATA SUMMARY:   Critical Behavior:              Functional Mobility Training:        Limited by low BP         Therapeutic Exercises:   SUPINE  EXERCISES   Sets   Reps   Active Active Assist   Passive Self ROM   Comments   Ankle Pumps 2 10 [x]                                        []                                        []                                        []                                           Quad Sets  10 [x]                                        []                                        []                                        []                                           Heel Slides 2 10 [x]                                        [x]                                        []                                        []                                        Active assist progressed to actvie   Hip Abduction 2 10 [x]                                        [x]                                        []                                        [] Glut Sets   []                                        []                                        []                                        []                                              []                                        []                                        []                                        []                                              []                                        []                                        []                                        []                                             STANDING  EXERCISES   Sets   Reps   Active Active Assist   Passive Self ROM   Comments   Heel Raises   []                                        []                                        []                                        []                                           Hip Abduction   []                                        []                                        []                                        []                                              []                                        []                                        []                                        []                                              []                                        []                                        []                                        []                                             Pain Rating:  C/o soreness and discomfort (4-5/10)    Activity Tolerance:   Limited (Low BP, deferred EOB transfer for safety reasons)  Please refer to the flowsheet for vital signs taken during this treatment. After treatment patient left in no apparent distress:   Supine in bed, Call bell within reach, Side rails x 3, and HOB elevated 58-60 degress. OT present    COMMUNICATION/COLLABORATION:   The patients plan of care was discussed with: Registered nurse.      Ekta Layne,CONNIE   Time Calculation: 27 mins

## 2020-10-06 NOTE — PROGRESS NOTES
Ortho Daily Progress Note    Date of Surgery:  10/5/2020      Patient: Calin Royal   YOB: 1933  Age: 80 y.o. SUBJECTIVE:   1 Day Post-Op following RIGHT TOTAL HIP ARTHROPLASTY  ANTERIOR APPROACH    The patient states moderate hip pain this morning. The patient rates their current level of pain as 4/10. The patient's post operative pain is adequately controlled. The patient denies CP, SOB, N/V.     OBJECTIVE:     Vital Signs:    Temp (24hrs), Av.8 °F (36.6 °C), Min:97.3 °F (36.3 °C), Max:98.2 °F (36.8 °C)      Patient Vitals for the past 24 hrs:   BP Temp Pulse Resp SpO2 Weight   10/06/20 0531      83.4 kg (183 lb 12.8 oz)   10/06/20 0331 (!) 97/59 97.9 °F (36.6 °C) 93 15 98 %    10/05/20 2204 105/64 98.2 °F (36.8 °C) 98 14 99 %    10/05/20 2051 112/69 98.2 °F (36.8 °C) (!) 103 14 99 %    10/05/20 1940 117/69 98 °F (36.7 °C) (!) 103 14 100 %    10/05/20 1845 117/75 98 °F (36.7 °C) (!) 112 14 99 %    10/05/20 1744 99/65  (!) 130 14 98 %    10/05/20 1739 110/72 97.7 °F (36.5 °C) (!) 108 14 98 %    10/05/20 1700 117/75 97.7 °F (36.5 °C) (!) 104 14 97 %    10/05/20 1630 114/64  (!) 101 12 100 %    10/05/20 1615 126/68  (!) 101 12 100 %    10/05/20 1605 125/71  100 12 100 %    10/05/20 1600 114/75  99 14 100 %    10/05/20 1555 125/67  99 13 100 %    10/05/20 1552 117/68 97.3 °F (36.3 °C) 99 14 100 %    10/05/20 1550 117/68 97.3 °F (36.3 °C) 100 14 100 %    10/05/20 1057 125/75 98.1 °F (36.7 °C) (!) 107 18 99 % 80 kg (176 lb 5.9 oz)           Physical Exam:  General: A&Ox3, NAD. Respiratory: Respirations are unlabored.   Abdomen: S/NT  Surgical site: C,D and I.  Musculoskeletal: Calves are S/NT, Alek dorsi/plantar flexion/EHL intact, Alek DP 1+  Neurological:  Alek LE's NVI    Laboratory Values:             Recent Labs     10/06/20  0432   HGB 7.8*   CREA 1.20*   *     Lab Results   Component Value Date/Time    Sodium 140 10/06/2020 04:32 AM    Potassium 4.4 10/06/2020 04:32 AM    Chloride 108 10/06/2020 04:32 AM    CO2 25 10/06/2020 04:32 AM    Glucose 132 (H) 10/06/2020 04:32 AM    BUN 20 10/06/2020 04:32 AM    Creatinine 1.20 (H) 10/06/2020 04:32 AM    Calcium 8.8 10/06/2020 04:32 AM         PLAN:     S/P RIGHT TOTAL HIP ARTHROPLASTY  ANTERIOR APPROACH WBAT. Mobilize with PT/nursing until discharged. Hemodynamics Hgb 7.8, hypotensive will transfuse 1 unit PRBC's today. Wound Dressing changes PRN. Post Operative Pain PO pain meds for pain control. DVT Prophylaxis Foot pump, encourage ankle pump exercises, mobilize.  mg BID for DVT prophylaxis. Discharge Disposition Will focus on pain control and mobilizing with PT/nursing. Case Management for discharge planning. Plan on home with HH/PT most likely tomorrow. Will continue to follow progress closely.              Signed By: Tiffany Ley PA-C  October 6, 2020 7:51 AM

## 2020-10-06 NOTE — PROGRESS NOTES
AMY: Patient resides at [facility]. Referrals pending with [facility] or [facility]. Patient will need COVID testing for placement (test ordered today)    Care Management Interventions  PCP Verified by CM: Yes  Mode of Transport at Discharge: S  Transition of Care Consult (CM Consult): SNF  Partner SNF: No  Reason Why Partner SNF Not Chosen: Friend/family recommendation  MyChart Signup: No  Physical Therapy Consult: Yes  Occupational Therapy Consult: Yes  Speech Therapy Consult: No  Current Support Network: Assisted Living  Confirm Follow Up Transport: Other (see comment)(BLS)  The Patient and/or Patient Representative was Provided with a Choice of Provider and Agrees with the Discharge Plan?: Yes  Freedom of Choice List was Provided with Basic Dialogue that Supports the Patient's Individualized Plan of Care/Goals, Treatment Preferences and Shares the Quality Data Associated with the Providers?: Yes  Discharge Location  Discharge Placement: Skilled nursing facility    CM spoke with daughter Bianka Marroquin #[phone], confirmed plans for rehab at [facility] (1st choice) or [address] [place] [address],[address]. The Plan for Transition of Care is related to the following treatment goals: SNF    The Patient and/or patient representative  was provided with a choice of provider and agrees   with the discharge plan. [x] Yes [] No    Freedom of choice list was provided with basic dialogue that supports the patient's individualized plan of care/goals, treatment preferences and shares the quality data associated with the providers. [x] Yes [] No    Observation notice provided in writing to patient and/or caregiver as well as verbal explanation of the policy. Patients who are in outpatient status also receive the Observation notice.       Debe Barthel, WILBERW/CRM

## 2020-10-06 NOTE — PROGRESS NOTES
Bedside shift change report given to eZinab Moyer (oncoming nurse) by Siena Wynn (offgoing nurse). Report included the following information SBAR, Kardex, Intake/Output and MAR.

## 2020-10-06 NOTE — CONSULTS
[facility] Cardiology Consult Note    Date of consult:  10/06/20  Date of admission: 10/5/2020  Primary Cardiologist: Dr Radha Arthur  Physician Requesting consult: Dr Gagandeep Burden / Reason for consult: H/o cardiomyopathy, CAD, CHF - post surgical anemia, hypotension. History of the presenting illness:  Veena Song is a 80 y.o. F with a history of CHF and CAD who underwent Rt THJR yesterday    Doing well but felt her heart racing and her BP was noted to be low. No shortness of breath. No chest pain. Working with PT. Past Medical History:   Diagnosis Date    Arrhythmia     \"EXTRA BEAT EVERY NOW AND THEN\"    Breast cancer ([facility] [address] 1997    lumpectomy, RADIATION    Cancer ([facility] [address] 2002    dcis left breast 2002    Cancer [facility])     SKIN CANCER    Cardiomyopathy ([facility] [address]     Chronic pain     Coronary artery disease involving native coronary artery of native heart without angina pectoris 12/16/2016    multiple stents    CVA (cerebral vascular accident) ([facility] [address] 09/13/2017    Dyslipidemia     Heart failure ([facility] [address]     CHF    High cholesterol     Hives of unknown origin     Hypertension     Local neurodermatitis     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 Authorizing Provider   furosemide (LASIX) 40 mg tablet Take 40 mg by mouth daily. Yes Provider, Historical   potassium chloride SR (KLOR-CON 10) 10 mEq tablet Take 10 mEq by mouth daily. Yes Provider, Historical   traMADoL (ULTRAM) 50 mg tablet Take 50 mg by mouth three (3) times daily as needed for Pain. Yes Provider, Historical   doxepin (SINEQUAN) 10 mg capsule Take 1 Cap by mouth nightly. 11/15/19  Yes Carolynn Burgess MD   fluticasone propionate [facility] ALLERGY RELIEF NA) 1 [place] by Nasal route daily.    Yes Provider, Historical   DULoxetine (CYMBALTA) 60 mg capsule Take 1 Cap by mouth daily. 4/2/19  Yes Wanda Suarez MD   lidocaine (LIDODERM) 5 % Apply patch to the affected area for 12 hours a day and remove for 12 hours a day. 3/18/19  Yes Tu Grace, DO   acetaminophen (TYLENOL) 500 mg tablet Take 650 mg by mouth three (3) times daily as needed for Pain. Yes Provider, Historical   cetirizine (ZYRTEC) 10 mg tablet Take 10 mg by mouth daily. Yes Provider, Historical   metoprolol succinate (TOPROL-XL) 50 mg XL tablet Take 75 mg by mouth daily. HOLD FOR BP <100 OR HR <60, AND NOTIFY MD 1/24/18  Yes Provider, Historical   aspirin delayed-release 81 mg tablet Take 81 mg by mouth daily. FOR CHF   Yes Provider, Historical   allopurinol (ZYLOPRIM) 300 mg tablet Take 1/2 tablet by mouth daily. For tophaceous gout 1/12/18  Yes Wanda Suarez MD   atorvastatin (LIPITOR) 20 mg tablet Take 20 mg by mouth nightly. Yes Provider, Historical   lisinopril (PRINIVIL, ZESTRIL) 10 mg tablet Take 10 mg by mouth daily. Yes Provider, Historical   diclofenac (VOLTAREN) 1 % gel Apply  to affected area two (2) times a day. Provider, Historical   sodium chloride ([place] Saline) 0.65 % drop 2 Drops three (3) times daily as needed for Congestion. Provider, Historical   multivitamin,tx-iron-ca-min (Thera-M) 27-0.4 mg tab Take 1 Tab by mouth daily. Provider, Historical   clindamycin (CLEOCIN T) 1 % lotion Apply  to affected area two (2) times daily as needed. use thin film on affected area    Provider, Historical   apixaban (ELIQUIS) 5 mg tablet Take 1 Tab by mouth two (2) times a day. 9/25/18   Wanda Suarez MD   cholecalciferol, vitamin D3, (VITAMIN D3 PO) Take 1 Tab by mouth daily. Unsure of dose    Provider, Historical   nitroglycerin (NITROSTAT) 0.4 mg SL tablet every five (5) minutes as needed.  12/26/17   Provider, Historical       Allergies   Allergen Reactions    Amoxicillin-Pot Clavulanate Nausea and Vomiting    Cephalexin Nausea and Vomiting    Doxycycline Nausea and Vomiting    Neomycin-Polymyxin-Hc Nausea and Vomiting    Poison Oak Extract Rash    Potassium Clavulanate Other (comments)    Rocephin [Ceftriaxone] Nausea and Vomiting    Sulfamethoxazole-Trimethoprim Nausea and Vomiting        Family History   Problem Relation Age of Onset    Cancer Sister         breast cancer    Breast Cancer Sister 61    Dementia Sister     Cancer Mother         PANCREAS     Diabetes Father     Other Father         PVD- AMPUTATION BL LE    Heart Disease Brother     Other Brother         ALS    Heart Failure Son     Heart Disease Son     Hypertension Son     No Known Problems Daughter     Heart Disease Brother     Heart Surgery Brother     Heart Attack Brother     Heart Disease Brother     Dementia Sister     No Known Problems Sister     Anesth Problems Neg Hx        Social History     Socioeconomic History    Marital status:      Spouse name: Not on file    Number of children: Not on file    Years of education: Not on file    Highest education level: Not on file   Occupational History    Not on file   Social Needs    Financial resource strain: Not on file    Food insecurity     Worry: Not on file     Inability: Not on file    Transportation needs     Medical: Not on file     Non-medical: Not on file   Tobacco Use    Smoking status: Never Smoker    Smokeless tobacco: Never Used   Substance and Sexual Activity    Alcohol use: No    Drug use: No    Sexual activity: Not on file   Lifestyle    Physical activity     Days per week: Not on file     Minutes per session: Not on file    Stress: Not on file   Relationships    Social connections     Talks on phone: Not on file     Gets together: Not on file     Attends [employer] service: Not on file     Active member of club or organization: Not on file     Attends meetings of clubs or organizations: Not on file     Relationship status: Not on file    Intimate partner violence     Fear of current or ex partner: Not on file Emotionally abused: Not on file     Physically abused: Not on file     Forced sexual activity: Not on file   Other Topics Concern    Not on file   Social History Narrative    Not on file       Review of Systems   Constitutional: Negative for chills and fever. HENT: Negative for hearing loss and nosebleeds. Eyes: Negative for blurred vision and double vision. Respiratory: Negative for cough, sputum production and shortness of breath. Cardiovascular: Positive for palpitations. Negative for chest pain. Gastrointestinal: Negative for abdominal pain, nausea and vomiting. Genitourinary: Negative for frequency and urgency. Musculoskeletal: Negative for back pain and joint pain. Skin: Negative for itching and rash. Neurological: Negative for dizziness and headaches. Endo/Heme/Allergies: Negative for environmental allergies. Does not bruise/bleed easily. Visit Vitals  BP 96/73 (BP Patient Position: Head of bed elevated (Comment degrees)) Comment (BP Patient Position): 58-60 degrees   Pulse 100   Temp 99.2 °F (37.3 °C)   Resp 16   Wt 83.4 kg (183 lb 12.8 oz)   SpO2 98%   Breastfeeding No   BMI 35.90 kg/m²     Physical Exam  Constitutional:       Appearance: She is obese. Interventions: Nasal cannula in place. HENT:      Head: Normocephalic and atraumatic. Eyes:      General: No scleral icterus. Conjunctiva/sclera: Conjunctivae normal.   Neck:      Vascular: No JVD. Cardiovascular:      Rate and Rhythm: Normal rate and regular rhythm. Heart sounds: Normal heart sounds. No murmur. No gallop. Pulmonary:      Effort: Pulmonary effort is normal. No respiratory distress. Breath sounds: Normal breath sounds. No stridor. No wheezing. Abdominal:      General: There is no distension. Palpations: Abdomen is soft. Musculoskeletal: Normal range of motion. General: No deformity. Skin:     General: Skin is warm and dry.    Neurological:      Mental Status: She is alert and oriented to person, place, and time. Psychiatric:         Mood and Affect: Mood and affect normal.         Lab review:  BMP:   Lab Results   Component Value Date/Time     10/06/2020 04:32 AM    K 4.4 10/06/2020 04:32 AM     10/06/2020 04:32 AM    CO2 25 10/06/2020 04:32 AM    AGAP 7 10/06/2020 04:32 AM     (H) 10/06/2020 04:32 AM    BUN 20 10/06/2020 04:32 AM    CREA 1.20 (H) 10/06/2020 04:32 AM    GFRAA 52 (L) 10/06/2020 04:32 AM    GFRNA 43 (L) 10/06/2020 04:32 AM        CBC:  Lab Results   Component Value Date/Time    WBC 11.2 (H) 09/28/2020 03:15 PM    HGB (POC) 12.0 06/03/2019 12:39 PM    HGB 7.8 (L) 10/06/2020 04:32 AM    HCT (POC) 35.4 06/03/2019 12:39 PM    HCT 39.8 09/28/2020 03:15 PM    PLATELET 403 (H) 03/52/8340 03:15 PM    .0 (H) 09/28/2020 03:15 PM       All Cardiac Markers in the last 24 hours:  No results found for: CPK, CK, CKMMB, CKMB, RCK3, CKMBT, CKMBPOC, CKNDX, CKND1, LEONEL, TROPT, TROIQ, VICKI, TROPT, TNIPOC, BNP, BNPP, BNPNT    Lab Results   Component Value Date/Time    Cholesterol, total 124 09/24/2018 03:34 AM    Cholesterol (POC) 138 06/21/2019 02:22 PM    HDL Cholesterol 48 09/24/2018 03:34 AM    HDL Cholesterol (POC) 60 06/21/2019 02:22 PM    LDL Cholesterol (POC) 63 06/21/2019 02:22 PM    LDL, calculated 60.2 09/24/2018 03:34 AM    VLDL, calculated 15.8 09/24/2018 03:34 AM    Triglyceride 79 09/24/2018 03:34 AM    Triglycerides (POC) 80 06/21/2019 02:22 PM    CHOL/HDL Ratio 2.6 09/24/2018 03:34 AM        Data review:  Echocardiogram: ([facility])  EF 30-35% with mild LVH, mild AI, mild MR    Nuclear stress test 9/22/20 ([facility] records)  Apical infarct with no ischemia. EF 35%. Assessment:    1. Osteoarthritis right hip s/p Rt THJR     2. Post op anemia from blood loss    3. Hypotension secondary to above    4. Cardiomyopathy: EF 35-40% - no overt CHF manifestations currently    5. CAD: stable. No ischemia on recent stress test    6.  PAF: history of a.fib noted on prior Holter, with h/o cardioembolic stroke    7. CKD stage III - renal indices stable    Recommendations / Plan:    Transfuse as needed   IV fluids ok for now, but caution given age and low EF - would let this run overnight and then d/c, encourage PO intake  Continue OP cardiac meds (metoprolol and lisinopril) for now, but can hold if she still has soft BP. Available if needed. F/u with Dr Cole Méndez in 6 months    Signed:  Kelsey Finn.  [facility]  Interventional Cardiology  10/06/20

## 2020-10-06 NOTE — PROGRESS NOTES
Post op day 1. Patient is receiving a unit of blood. Patient symptomatic from blood loss from surgery.

## 2020-10-06 NOTE — PROGRESS NOTES
Ortho Daily Progress Note    10/6/2020  8:18 AM    POD:  1 Day Post-Op  S/P:  Procedure(s):  RIGHT TOTAL HIP ARTHROPLASTY  ANTERIOR APPROACH    Afebrile/elevated HR and hypotensive overnight, feels symptomatic- C/O my heart is racing. Doing well without complaints of nausea  Confused overnight, appears at baseline this morning  Pain well controlled  Calves soft/NTTP Bilaterally  Incision OK, no drainage or dehiscence. Thigh soft. Dressing clean and dry  Moving lower extremities well. Neurocirculatory exam intact and within normal range. Cartwright catheter in place- unable to mobilize well as a result of her anemia- keep in until mobility tolerance improves from a hemodynamic standpoint  Lab Results   Component Value Date/Time    HGB 7.8 (L) 10/06/2020 04:32 AM    INR 1.1 09/28/2020 03:15 PM     Recent Labs     10/06/20  0432 09/28/20  1515   CREA 1.20* 1.47*   BUN 20 27*     Estimated Creatinine Clearance: 32.2 mL/min (A) (based on SCr of 1.2 mg/dL (H)).     PLAN: transfuse 1 unit of PRBC's for anticipated acute, symptomatic postoperative blood loss anemia   DVT prophylaxis: Eliquis 2.5 mg bid  WBAT with PT-mobilization  Pain Control: tylenol norco  Plan to D/C family requesting SNF      RISHI Davis

## 2020-10-06 NOTE — PROGRESS NOTES
Patient states her heart feels \"like it's going hot and fast then slow\". Heart rate appears to be regular and consistently at  bpm.  Patient states that she feels this at home and her cardiologist has been informed prior to surgery as well.

## 2020-10-06 NOTE — PROGRESS NOTES
Primary Nurse Kari Lyon and Lindsey Multani RN performed a dual skin assessment on this patient No impairment noted  Esau score is 18    Only deficit noted is right hip

## 2020-10-06 NOTE — PROGRESS NOTES
Physical Therapy  10/6/2020    Chart reviewed. Spoke w/ RN. Pt receiving unit of blood a this time. Will defer and check back this afternoon as able and appropriate.      Ekta Layne, PTA

## 2020-10-06 NOTE — PROGRESS NOTES
Problem: Falls - Risk of  Goal: *Absence of Falls  Description: Document Veatrice Marker Fall Risk and appropriate interventions in the flowsheet.   Outcome: Progressing Towards Goal  Note: Fall Risk Interventions:  Mobility Interventions: Communicate number of staff needed for ambulation/transfer         Medication Interventions: Bed/chair exit alarm    Elimination Interventions: Call light in reach    History of Falls Interventions: Door open when patient unattended

## 2020-10-06 NOTE — PROGRESS NOTES
Problem: Self Care Deficits Care Plan (Adult)  Goal: *Acute Goals and Plan of Care (Insert Text)  Description: FUNCTIONAL STATUS PRIOR TO ADMISSION: Patient was modified independent using a rollator for functional mobility. HOME SUPPORT: Pt lives in [place], receives assistance for medication and meals, does not receive assistance for ADLs prior to surgery    Occupational Therapy Goals  Initiated 10/6/2020  1. Patient will perform lower body ADLs with AE supervision/set-up within 4 day(s). 2.  Patient will perform upper body ADLs standing 5 mins without fatigue or LOB with supervision/set-up within 4 day(s). 3.  Patient will perform toilet transfer with supervision/set-up within 4 day(s). 4.  Patient will perform all aspects of toileting with supervision/set-up within 4 day(s). 5.  Patient will participate in upper extremity therapeutic exercise/activities with supervision/set-up for 10 minutes within 4 day(s). 6.  Patient will utilize energy conservation techniques during functional activities without cues within 4 day(s). Outcome: Progressing Towards Goal     OCCUPATIONAL THERAPY EVALUATION  Patient: Kirk Flores (65 y.o. female)  Date: 10/6/2020  Primary Diagnosis: Primary localized osteoarthritis of right hip [M16.11]  Procedure(s) (LRB):  RIGHT TOTAL HIP ARTHROPLASTY  ANTERIOR APPROACH (Right) 1 Day Post-Op   Precautions:   Fall, WBAT    ASSESSMENT  Based on the objective data described below, the patient presents with low BP, pain, decreased ADL related mobility and performance s/p R TIGRE POD 1. Pt OOB and EOB activity limited this date by low Bps in supine with HOB slightly raised (80s/60s). Pt had received blood transfusion prior to OT session. Participated in BLE there ex to attempt to increase BP, however remained low (90's/60s). Participated in grooming with HOB raised, perseverative with teeth brushing, benefiting from cue to terminate activity.  Deferred EOB/OOB at this time due to Bps and nursing made aware. Pt will benefit from OT during hospital stay to optimize functional recovery and participation in ADLs and continue to progress OOB activity. Recommend SNF at d/c to continue progress beyond hospital stay. Current Level of Function Impacting Discharge (ADLs/self-care): up to max A for LB self-care, low BP    Functional Outcome Measure: The patient scored 25/100 on the Barthel Index outcome measure. Other factors to consider for discharge: below functional baseline     Patient will benefit from skilled therapy intervention to address the above noted impairments. PLAN :  Recommendations and Planned Interventions: self care training, functional mobility training, therapeutic exercise, balance training, therapeutic activities, endurance activities, patient education, home safety training, and family training/education    Frequency/Duration: Patient will be followed by occupational therapy 5 times a week to address goals. Recommendation for discharge: (in order for the patient to meet his/her long term goals)  Therapy up to 5 days/week in SNF setting    This discharge recommendation:  Has not yet been discussed the attending provider and/or case management    IF patient discharges home will need the following DME: TBD pending progress, likely long hanlded DME       SUBJECTIVE:   Patient stated I was in rehab for 5 weeks after my last hip.     OBJECTIVE DATA SUMMARY:   HISTORY:   Past Medical History:   Diagnosis Date    Arrhythmia     \"EXTRA BEAT EVERY NOW AND THEN\"    Breast cancer ([facility] [address] 1997    lumpectomy, RADIATION    Cancer ([facility] [address] 2002    dcis left breast 2002    Cancer [facility])     SKIN CANCER    Cardiomyopathy ([facility] [address]     Chronic pain     Coronary artery disease involving native coronary artery of native heart without angina pectoris 12/16/2016    multiple stents    CVA (cerebral vascular accident) ([facility] [address] 09/13/2017    Dyslipidemia     Heart failure (HCC)     CHF    High cholesterol     Hives of unknown origin     Hypertension     Local neurodermatitis     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     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    Venous insufficiency 5/17/2011     Past Surgical History:   Procedure Laterality Date    CARDIAC SURG PROCEDURE UNLIST  2015    STENT PLACEMENTS X2- \"LOTS OF STENTS\"    HX BREAST BIOPSY Left 1980`S    HX BREAST LUMPECTOMY  1988    dcis lelf breast    HX CATARACT REMOVAL Bilateral 1980`S    HX COLONOSCOPY      HX CORONARY STENT PLACEMENT  2015    X6    HX GI      COLONOSCOPY    HX HYSTERECTOMY  1960    HX ORTHOPAEDIC      RIGHT TKR    HX OTHER SURGICAL      ORAL    HX OTHER SURGICAL  07/2018    SQUAMOUS CELL CA REMOVED FROM RIGHT FOREARM     TOTAL HIP ARTHROPLASTY Left 2018    VASCULAR SURGERY PROCEDURE UNLIST      varicose veins       Expanded or extensive additional review of patient history:     Home Situation  Home Environment: Assisted living  [address] [facility] [address] [facility]  Living Alone: Yes  Current DME Used/Available at Home: Shower chair, Walker  Tub or Shower Type: Shower    Hand dominance: Right    EXAMINATION OF PERFORMANCE DEFICITS:  Cognitive/Behavioral Status:                      Skin: no issues observed, see nursing notes for details    Edema: none observed    Hearing: Auditory  Auditory Impairment: None    Vision/Perceptual:                                     Range of Motion:    AROM: Generally decreased, functional                         Strength:    Strength: Generally decreased, functional                Coordination:     Fine Motor Skills-Upper: Left Impaired;Right Impaired(difficulty with small containers)    Gross Motor Skills-Upper: Left Intact; Right Intact    Tone & Sensation:          Balance:  Sitting: (unable to assess due to BP)    Functional Mobility and Transfers for ADLs:      Transfers:   Unable to attempt due to low BPs               ADL Intervention and task modifications: Grooming  Grooming Assistance: Set-up; Supervision;Stand-by assistance  Position Performed: Long sitting on bed  Brushing Teeth: Set-up; Supervision;Stand-by assistance(perseverative, cues to terminate)                   Lower Body Dressing Assistance  Socks: Total assistance (dependent)              Functional Measure:  Barthel Index:    Bathin  Bladder: 0  Bowels: 10  Groomin  Dressin  Feeding: 10  Mobility: 0  Stairs: 0  Toilet Use: 0  Transfer (Bed to Chair and Back): 0  Total: 25/100        The Barthel ADL Index: Guidelines  1. The index should be used as a record of what a patient does, not as a record of what a patient could do. 2. The main aim is to establish degree of independence from any help, physical or verbal, however minor and for whatever reason. 3. The need for supervision renders the patient not independent. 4. A patient's performance should be established using the best available evidence. Asking the patient, friends/relatives and nurses are the usual sources, but direct observation and common sense are also important. However direct testing is not needed. 5. Usually the patient's performance over the preceding 24-48 hours is important, but occasionally longer periods will be relevant. 6. Middle categories imply that the patient supplies over 50 per cent of the effort. 7. Use of aids to be independent is allowed. [place]., Barthel, DJorge AW. (0439). Functional evaluation: the Barthel Index. [facility] (14)2. JOSELO Lin, Teofilo Encarnacion., Bettina Campbell., [place], [address] [place] [address] (). Measuring the change indisability after inpatient rehabilitation; comparison of the responsiveness of the Barthel Index and Functional [place] Measure. Journal of Neurology, Neurosurgery, and Psychiatry, 66(4), 297-442. HEAVENLY Castle.A, OSCAR Greer, & Laura Galvan MJorge AA. (2004.) Assessment of post-stroke quality of life in cost-effectiveness studies:  The usefulness of the Barthel Index and the EuroQoL-5D. Quality of Life Research, 15, 833-87         Occupational Therapy Evaluation Charge Determination   History Examination Decision-Making   LOW Complexity : Brief history review  MEDIUM Complexity : 3-5 performance deficits relating to physical, cognitive , or psychosocial skils that result in activity limitations and / or participation restrictions MEDIUM Complexity : Patient may present with comorbidities that affect occupational performnce. Miniml to moderate modification of tasks or assistance (eg, physical or verbal ) with assesment(s) is necessary to enable patient to complete evaluation       Based on the above components, the patient evaluation is determined to be of the following complexity level: MEDIUM  Pain Rating:  Not rated    Activity Tolerance:   Fair  Please refer to the flowsheet for vital signs taken during this treatment. After treatment patient left in no apparent distress:    Supine in bed, Heels elevated for pressure relief, and Call bell within reach    COMMUNICATION/EDUCATION:   The patients plan of care was discussed with: Physical therapy assistant and Registered nurse. Home safety education was provided and the patient/caregiver indicated understanding. and Patient/family have participated as able in goal setting and plan of care. This patients plan of care is appropriate for delegation to [facility].     Thank you for this referral.  Elvis Balderrama OT  Time Calculation: 33 mins

## 2020-10-07 LAB
ABO + RH BLD: NORMAL
BLD PROD TYP BPU: NORMAL
BLOOD GROUP ANTIBODIES SERPL: NORMAL
BPU ID: NORMAL
CROSSMATCH RESULT,%XM: NORMAL
HGB BLD-MCNC: 9 G/DL (ref 11.5–16)
SPECIMEN EXP DATE BLD: NORMAL
STATUS OF UNIT,%ST: NORMAL
UNIT DIVISION, %UDIV: 0

## 2020-10-07 PROCEDURE — 97116 GAIT TRAINING THERAPY: CPT

## 2020-10-07 PROCEDURE — 36415 COLL VENOUS BLD VENIPUNCTURE: CPT

## 2020-10-07 PROCEDURE — 97530 THERAPEUTIC ACTIVITIES: CPT

## 2020-10-07 PROCEDURE — 74011250637 HC RX REV CODE- 250/637: Performed by: PHYSICIAN ASSISTANT

## 2020-10-07 PROCEDURE — 85018 HEMOGLOBIN: CPT

## 2020-10-07 PROCEDURE — 87635 SARS-COV-2 COVID-19 AMP PRB: CPT

## 2020-10-07 PROCEDURE — 65270000029 HC RM PRIVATE

## 2020-10-07 RX ADMIN — METOPROLOL SUCCINATE 50 MG: 50 TABLET, EXTENDED RELEASE ORAL at 09:28

## 2020-10-07 RX ADMIN — Medication 10 ML: at 22:42

## 2020-10-07 RX ADMIN — ACETAMINOPHEN 325 MG: 325 TABLET ORAL at 17:16

## 2020-10-07 RX ADMIN — DULOXETINE 60 MG: 60 CAPSULE, DELAYED RELEASE ORAL at 09:29

## 2020-10-07 RX ADMIN — ATORVASTATIN CALCIUM 20 MG: 20 TABLET, FILM COATED ORAL at 22:37

## 2020-10-07 RX ADMIN — DOXEPIN HYDROCHLORIDE 10 MG: 10 CAPSULE ORAL at 22:37

## 2020-10-07 RX ADMIN — LISINOPRIL 10 MG: 10 TABLET ORAL at 09:28

## 2020-10-07 RX ADMIN — ALLOPURINOL 100 MG: 100 TABLET ORAL at 09:29

## 2020-10-07 RX ADMIN — ACETAMINOPHEN 325 MG: 325 TABLET ORAL at 12:42

## 2020-10-07 RX ADMIN — POLYETHYLENE GLYCOL 3350 17 G: 17 POWDER, FOR SOLUTION ORAL at 09:29

## 2020-10-07 RX ADMIN — CETIRIZINE HYDROCHLORIDE 10 MG: 10 TABLET, FILM COATED ORAL at 09:29

## 2020-10-07 RX ADMIN — FUROSEMIDE 40 MG: 20 TABLET ORAL at 09:29

## 2020-10-07 RX ADMIN — POTASSIUM CHLORIDE 10 MEQ: 750 TABLET, FILM COATED, EXTENDED RELEASE ORAL at 09:28

## 2020-10-07 RX ADMIN — APIXABAN 2.5 MG: 2.5 TABLET, FILM COATED ORAL at 12:42

## 2020-10-07 RX ADMIN — ASPIRIN 81 MG: 81 TABLET, COATED ORAL at 09:29

## 2020-10-07 RX ADMIN — DOCUSATE SODIUM 50MG AND SENNOSIDES 8.6MG 1 TABLET: 8.6; 5 TABLET, FILM COATED ORAL at 17:16

## 2020-10-07 NOTE — PROGRESS NOTES
Pt refusing meds throughout shift, stating that MD told her not to take them after the blood transfusion. This nurse explained to pt that it was okay to take the scheduled medications and that the MD just did not want her taking anything too strong that would potentially make her hypotensive again. Pt continued to refuse but has been resting in bed throughout shift. Pt wishing she could get up. VSS.

## 2020-10-07 NOTE — PROGRESS NOTES
Problem: Mobility Impaired (Adult and Pediatric)  Goal: *Acute Goals and Plan of Care (Insert Text)  Description: FUNCTIONAL STATUS PRIOR TO ADMISSION: Patient was modified independent using a rollator and walking only very short distances for functional mobility. HOME SUPPORT PRIOR TO ADMISSION: Patient lived at [facility] living facility where she gets assist with her meals but is able to manage her own mobility and ADLs within her apartment. Physical Therapy Goals  Initiated 10/5/2020    1. Patient will move from supine to sit and sit to supine  in bed with modified independence within 4 days. 2. Patient will perform sit to stand with modified independence within 4 days. 3. Patient will ambulate with modified independence for 150 feet with the least restrictive device within 4 days. 4. Patient will perform TIGRE home exercise program per protocol with modified independence within 4 days. 10/7/2020 1641 by Tricia Castro  Outcome: Progressing Towards Goal   PHYSICAL THERAPY TREATMENT  Patient: Niraj Bello (78 y.o. female)  Date: 10/7/2020  Diagnosis: Primary localized osteoarthritis of right hip [M16.11]  Degenerative joint disease of right hip [M16.11]   <principal problem not specified>  Procedure(s) (LRB):  RIGHT TOTAL HIP ARTHROPLASTY  ANTERIOR APPROACH (Right) 2 Days Post-Op  Precautions: Fall, WBAT  Chart, physical therapy assessment, plan of care and goals were reviewed. ASSESSMENT  Patient continues with skilled PT services and is progressing towards goals. Patient had rung call bell, requesting to go to toilet for BM. Performed bed mobility, sitting balance, transfer to bedside commode, sat for bowel movement, leaned forward for toilet hygiene, stood at [address] [place] [address] in order for clinicians to pull up Depends, transferred back to bed. Conitnued to require maximal-moderate assistance of 2, but moved a bit faster and with more confidence. Patient appears to still have some confusion. Spoke with Clinical Catalina Hernandes about need for indwelling Cartwright catheter on POD#2. If not urologically necessary, patient can be switched to Pur-Wick until more proficient with bedside commode transfers. Continue PT BID tomorrow. Goal: up in chair and ambulation 5 ft with RW. Current Level of Function Impacting Discharge (mobility/balance): see flow sheets below    Other factors to consider for discharge: [facility] in jail which can not provide needed rehab/has not returned to modified independent PLOF         PLAN :  Patient continues to benefit from skilled intervention to address the above impairments. Continue treatment per established plan of care. to address goals. Recommendation for discharge: (in order for the patient to meet his/her long term goals)  Therapy up to 5 days/week in SNF setting    This discharge recommendation:  Has been made in collaboration with the attending provider and/or case management    IF patient discharges home will need the following DME: rolling walker       SUBJECTIVE:   Patient stated I need to use the toilet for a BM.     OBJECTIVE DATA SUMMARY:   Critical Behavior:     Orientation Level: Oriented X4(periodic confusion)        Functional Mobility Training:  Bed Mobility:     Supine to Sit: Moderate assistance;Assist x2  Sit to Supine: Maximum assistance;Assist x2  Scooting: Maximum assistance;Assist x2        Transfers:  Sit to Stand: Moderate assistance;Assist x2  Stand to Sit: Minimum assistance;Assist x2                             Balance:  Sitting: Intact  Sitting - Static: Fair (occasional); Prop sitting  Sitting - Dynamic: Poor (constant support)  Standing: Impaired; With support  Standing - Static: Fair;Constant support  Standing - Dynamic : Fair;Poor;Constant support  Ambulation/Gait Training:  Distance (ft): 3 Feet (ft)(from toilet to bed/turning, backing up)  Assistive Device: Walker, rolling;Gait belt  Ambulation - Level of Assistance:  Moderate assistance;Assist x2        Gait Abnormalities: Antalgic;Decreased step clearance;Shuffling gait(difficulty propelling RLE forward)  Right Side Weight Bearing: As tolerated     Base of Support: Widened;Shift to left  Stance: Right decreased  Speed/Juliana: Pace decreased (<100 feet/min); Shuffled  Step Length: Right shortened;Left shortened  Swing Pattern: Right asymmetrical;Left asymmetrical     Interventions: Manual cues; Safety awareness training         Pain Ratin/10    Activity Tolerance:   Fair    After treatment patient left in no apparent distress:   Supine in bed, Call bell within reach, Caregiver / family present, Side rails x 3, and nurse notified. COMMUNICATION/COLLABORATION:   The patients plan of care was discussed with: Registered nurse and Rehabilitation technician.      Wan Ramirez   Time Calculation: 45 mins

## 2020-10-07 NOTE — PROGRESS NOTES
RUR 13%  AMY: Patient resides at [facility]. [facility] can accept patient for rehab. Patient will need COVID testing for placement. Test ordered today. Chart reviewed. CM updated the daughter at bedside. Care management will follow.     Andrey Jo, BSW/CRM

## 2020-10-07 NOTE — PROGRESS NOTES
Problem: Patient Education: Go to Patient Education Activity  Goal: Patient/Family Education  Outcome: Progressing Towards Goal     Problem: Hip Replacement: Post Op Day 1  Goal: Activity/Safety  Outcome: Progressing Towards Goal  Goal: Diagnostic Test/Procedures  Outcome: Progressing Towards Goal  Goal: Nutrition/Diet  Outcome: Progressing Towards Goal  Goal: Medications  Outcome: Progressing Towards Goal  Goal: Respiratory  Outcome: Progressing Towards Goal  Goal: Treatments/Interventions/Procedures  Outcome: Progressing Towards Goal  Goal: Psychosocial  Outcome: Progressing Towards Goal  Goal: Discharge Planning  Outcome: Progressing Towards Goal  Goal: *Demonstrates progressive activity  Outcome: Progressing Towards Goal  Goal: *Optimal pain control at patient's stated goal  Outcome: Progressing Towards Goal  Goal: *Hemodynamically stable  Outcome: Progressing Towards Goal  Goal: *Discharge plan identified  Outcome: Progressing Towards Goal     Problem: Hypertension  Goal: *Blood pressure within specified parameters  Outcome: Progressing Towards Goal  Goal: *Fluid volume balance  Outcome: Progressing Towards Goal  Goal: *Labs within defined limits  Outcome: Progressing Towards Goal     Problem: Patient Education: Go to Patient Education Activity  Goal: Patient/Family Education  Outcome: Progressing Towards Goal     Problem: Pressure Injury - Risk of  Goal: *Prevention of pressure injury  Description: Document Esau Scale and appropriate interventions in the flowsheet.   Outcome: Progressing Towards Goal  Note: Pressure Injury Interventions:  Sensory Interventions: Assess changes in LOC, Float heels    Moisture Interventions: Absorbent underpads, Check for incontinence Q2 hours and as needed, Maintain skin hydration (lotion/cream), Minimize layers    Activity Interventions: Increase time out of bed, Pressure redistribution bed/mattress(bed type), PT/OT evaluation    Mobility Interventions: Float heels, HOB 30 degrees or less, Pressure redistribution bed/mattress (bed type), PT/OT evaluation    Nutrition Interventions: Document food/fluid/supplement intake    Friction and Shear Interventions: Lift sheet, Minimize layers                Problem: Patient Education: Go to Patient Education Activity  Goal: Patient/Family Education  Outcome: Progressing Towards Goal     Problem: Falls - Risk of  Goal: *Absence of Falls  Description: Document Xiang Fall Risk and appropriate interventions in the flowsheet.   Outcome: Progressing Towards Goal  Note: Fall Risk Interventions:  Mobility Interventions: Communicate number of staff needed for ambulation/transfer, Patient to call before getting OOB, PT Consult for mobility concerns, PT Consult for assist device competence, Strengthening exercises (ROM-active/passive), Utilize walker, cane, or other assistive device, Utilize gait belt for transfers/ambulation    Mentation Interventions: Adequate sleep, hydration, pain control, Door open when patient unattended, Evaluate medications/consider consulting pharmacy, Gait belt with transfers/ambulation, Update white board, Toileting rounds, Family/sitter at bedside, Eyeglasses and hearing aids    Medication Interventions: Evaluate medications/consider consulting pharmacy, Patient to call before getting OOB    Elimination Interventions: Call light in reach, Patient to call for help with toileting needs, Toileting schedule/hourly rounds    History of Falls Interventions: Door open when patient unattended, Investigate reason for fall, Evaluate medications/consider consulting pharmacy         Problem: Patient Education: Go to Patient Education Activity  Goal: Patient/Family Education  Outcome: Progressing Towards Goal

## 2020-10-07 NOTE — PROGRESS NOTES
ORTHO PROGRESS NOTE    2020  Admit Date:   10/5/2020        Subjective:    Leonie Mock is a 80 y.o. WHITE OR  female who is 2 Days Post-Op Procedure(s):  RIGHT TOTAL HIP ARTHROPLASTY  ANTERIOR APPROACH. The patient states significant pain. Mild confusion this morning. Has been refusing pain medication throughout the night. Tolerated blood transfusion well. The patient denies CP, SOB, N/V. Vital Signs:    Patient Vitals for the past 8 hrs:   BP Temp Pulse Resp SpO2 Weight   10/07/20 0603      83 kg (183 lb)   10/07/20 0221 130/75 99 °F (37.2 °C) (!) 102 16 90 %      Temp (24hrs), Av.4 °F (36.9 °C), Min:98 °F (36.7 °C), Max:99.2 °F (37.3 °C)      Pain Control:   Pain Assessment  Pain Scale 1: Numeric (0 - 10)  Pain Intensity 1: 0  Pain Onset 1: post op  Pain Location 1: Hip  Pain Orientation 1: Right  Pain Description 1: Aching  Pain Intervention(s) 1: Medication (see MAR)    LAB:    Recent Labs     10/07/20  0450 10/06/20  0432   HGB 9.0* 7.8*   NA  --  140   K  --  4.4   CL  --  108   CO2  --  25   BUN  --  20   CREA  --  1.20*   GLU  --  132*       Assessment & Physician's Comment:  Dressing is clean, dry, and intact  Respirations unlabored  Abdomen S/NT  Dorsi/plantar flexion 5/5  DP/PT 1+, calves S/NT  Neurovascular checks within normal limits    Procedure:  Procedure(s):  RIGHT TOTAL HIP ARTHROPLASTY  ANTERIOR APPROACH    Plan:  1) Pain Control: Hold narcotics until confusion improves. 2) Hemodynamics: Improved following blood transfusion. 3) Wound: Change dressing PRN. 4) Activity: WBAT, mobilize with assist. Should be able to mobilize today as BP appears to be improved. 5) DVT Prophylaxis: Eliquis, ASA, SCD's, encourage ankle pump exercise, mobilize. 6) Disposition: Case management for discharge planning. Plan on home today/tomorrow with HH/PT. Follow up in Dr Román Pereira office for post op care in 3 weeks.         Leatha Ceballos PA-C

## 2020-10-07 NOTE — PROGRESS NOTES
Problem: Mobility Impaired (Adult and Pediatric)  Goal: *Acute Goals and Plan of Care (Insert Text)  Description: FUNCTIONAL STATUS PRIOR TO ADMISSION: Patient was modified independent using a rollator and walking only very short distances for functional mobility. HOME SUPPORT PRIOR TO ADMISSION: Patient lived at [facility] living facility where she gets assist with her meals but is able to manage her own mobility and ADLs within her apartment. Physical Therapy Goals  Initiated 10/5/2020    1. Patient will move from supine to sit and sit to supine  in bed with modified independence within 4 days. 2. Patient will perform sit to stand with modified independence within 4 days. 3. Patient will ambulate with modified independence for 150 feet with the least restrictive device within 4 days. 4. Patient will perform TIGRE home exercise program per protocol with modified independence within 4 days. Outcome: Progressing Towards Goal   PHYSICAL THERAPY TREATMENT  Patient: Oz Mosley (88 y.o. female)  Date: 10/7/2020  Diagnosis: Primary localized osteoarthritis of right hip [M16.11]  Degenerative joint disease of right hip [M16.11]   <principal problem not specified>  Procedure(s) (LRB):  RIGHT TOTAL HIP ARTHROPLASTY  ANTERIOR APPROACH (Right) 2 Days Post-Op  Precautions: Fall, WBAT  Chart, physical therapy assessment, plan of care and goals were reviewed. ASSESSMENT  Patient continues with skilled PT services and is progressing towards goals. Patient complaining of pain and not wanting to get up. After much encouragement and maximal assistance of 2, many starts and stops. Patient able to sit on edge of bed using both arms to prop up. Performed sit-stand with moderate-maximal assistance of 2. Attempted to take a step forward, but unable. Requested to sit. Agreed to stand again and attempt to side-shuffle to head of bed, about 2-3 ft.  Returned to supine with maximal assistance of 2 and scooted to head of bed using draw sheet and total assistance of 2. NO ORTHOSTATIC HYPOTENSION this morning! Will see again this pm and attempt to progress. Current Level of Function Impacting Discharge (mobility/balance): See above. Other factors to consider for discharge: Lives in shelter and will not receive enough (rehab) support there/Far from PLOF/significant pain & decrease in mobility         PLAN :  Patient continues to benefit from skilled intervention to address the above impairments. Continue treatment per established plan of care. to address goals. Recommendation for discharge: (in order for the patient to meet his/her long term goals)  Therapy up to 5 days/week in SNF setting    This discharge recommendation:  Has been made in collaboration with the attending provider and/or case management    IF patient discharges home will need the following DME: rolling walker       SUBJECTIVE:   Patient stated I can't do this right now.     OBJECTIVE DATA SUMMARY:   Critical Behavior:     Orientation Level: Oriented X4(periodic confusion)        Functional Mobility Training:  Bed Mobility:     Supine to Sit: Maximum assistance;Assist x2  Sit to Supine: Maximum assistance;Assist x2  Scooting: Total assistance;Assist x2        Transfers:  Sit to Stand: Moderate assistance;Assist x2  Stand to Sit: Minimum assistance;Assist x2                             Balance:  Sitting: Impaired  Sitting - Static: Fair (occasional); Prop sitting  Sitting - Dynamic: Poor (constant support)  Standing: Impaired  Standing - Static: Fair;Constant support  Standing - Dynamic : Fair;Poor;Constant support  Ambulation/Gait Training:  Distance (ft): 3 Feet (ft)(side step to head of bed)  Assistive Device: Walker, rolling;Gait belt  Ambulation - Level of Assistance: Assist x2        Gait Abnormalities: Antalgic;Decreased step clearance;Shuffling gait  Right Side Weight Bearing: As tolerated     Base of Support: Widened;Shift to left  Stance: Left decreased              Interventions: Manual cues; Safety awareness training             Therapeutic Exercises: Ankle Pumps  Quad sets (5 second hold)  Glute sets (5 second hold)  X 10 reps every hour   Heel slides x 10  (assisted)  Required constant cueing, verbal and physical    Pain Ratin/10    Activity Tolerance:   Fair-limited due to pain  Please refer to the flowsheet for vital signs taken during this treatment. Vitals:    10/07/20 0603 10/07/20 1100 10/07/20 1202 10/07/20 1213   BP:  107/74 116/72 113/63   BP 1 Location:  Right arm Right arm    BP Patient Position:  At rest;Supine During activity; Sitting Supine; Post activity   Pulse:  98 99 99   Resp:       Temp:       SpO2:       Weight: 83 kg (183 lb)           After treatment patient left in no apparent distress:   Supine in bed, Call bell within reach, Side rails x 3, and nurse notified. COMMUNICATION/COLLABORATION:   The patients plan of care was discussed with: Occupational therapist, Registered nurse, and Physician.      Alexandra Carvajal   Time Calculation: 35 mins

## 2020-10-07 NOTE — PROGRESS NOTES
Problem: Self Care Deficits Care Plan (Adult)  Goal: *Acute Goals and Plan of Care (Insert Text)  Description: FUNCTIONAL STATUS PRIOR TO ADMISSION: Patient was modified independent using a rollator for functional mobility. HOME SUPPORT: Pt lives in [facility], receives assistance for medication and meals, does not receive assistance for ADLs prior to surgery    Occupational Therapy Goals  Initiated 10/6/2020  1. Patient will perform lower body ADLs with AE supervision/set-up within 4 day(s). 2.  Patient will perform upper body ADLs standing 5 mins without fatigue or LOB with supervision/set-up within 4 day(s). 3.  Patient will perform toilet transfer with supervision/set-up within 4 day(s). 4.  Patient will perform all aspects of toileting with supervision/set-up within 4 day(s). 5.  Patient will participate in upper extremity therapeutic exercise/activities with supervision/set-up for 10 minutes within 4 day(s). 6.  Patient will utilize energy conservation techniques during functional activities without cues within 4 day(s). Outcome: Progressing Towards Goal   OCCUPATIONAL THERAPY TREATMENT  Patient: Calin Royal (16 y.o. female)  Date: 10/7/2020  Diagnosis: Primary localized osteoarthritis of right hip [M16.11]  Degenerative joint disease of right hip [M16.11]   <principal problem not specified>  Procedure(s) (LRB):  RIGHT TOTAL HIP ARTHROPLASTY  ANTERIOR APPROACH (Right) 2 Days Post-Op  Precautions: Fall, WBAT  Chart, occupational therapy assessment, plan of care, and goals were reviewed. ASSESSMENT  Patient continues with skilled OT services and is progressing towards goals. Patient's daughter present and reports patient with history of confusion with pain medication. Reports having loud teenagers in her bathroom over night. She had not received pain medication this AM.  She had mild drowsiness and kept her eyes closed through out session. She reports eyes closed due to dizziness.   BP stable through out. Cues to open eyes through out session. Additional time needed to achieve erect sitting balance as patient guarding R hip- Session goal of BSC not achieved as patient with difficulty advancing LE and limited standing tolerance. Max Ax 2 for bed mob, mod Ax 2 for sit > stand and mod A x2 for side stepping lateral up in bed. Patient requesting to return to supine secondary to fatigue. Vitals:    10/07/20 0603 10/07/20 1100 10/07/20 1202 10/07/20 1213   BP:  107/74 116/72 113/63   BP 1 Location:  Right arm Right arm    BP Patient Position:  At rest;Supine During activity; Sitting Supine; Post activity   Pulse:  98 99 99   Resp:       Temp:       SpO2:       Weight: 83 kg (183 lb)            Current Level of Function Impacting Discharge (ADLs): x2 assist for basic functional mobility, self feeding set up in supported sitting, LB care total A    Other factors to consider for discharge: Patient was living in [address] [place] [address]. She is presenting below baseline with ADL tasks and mobility. Recommend SNF for additional rehab. PLAN :  Patient continues to benefit from skilled intervention to address the above impairments. Continue treatment per established plan of care. to address goals. Recommend with staff: bed in chair through out day as tolerated for additional time in erect posture    Recommend next OT session: self care transfer training with x2 assist for safety. Recommendation for discharge: (in order for the patient to meet his/her long term goals)  Therapy up to 5 days/week in SNF setting    This discharge recommendation:  Has been made in collaboration with the attending provider and/or case management    IF patient discharges home will need the following DME: unsafe to return to prior set up unless able to have 24 hour supervision and assistance       SUBJECTIVE:   Patient stated You need to give me time.     OBJECTIVE DATA SUMMARY:   Cognitive/Behavioral Status: Functional Mobility and Transfers for ADLs:  Bed Mobility:  Supine to Sit: Maximum assistance;Assist x2  Sit to Supine: Maximum assistance;Assist x2    Transfers:  Sit to Stand: Moderate assistance;Assist x2          Balance:  Sitting: Impaired  Sitting - Static: Prop sitting; Fair (occasional)  Sitting - Dynamic: Poor (constant support)  Standing: Impaired  Standing - Static: Constant support; Fair  Standing - Dynamic : Constant support; Fair    ADL Intervention:       Lower Body Dressing Assistance  Socks: Total assistance (dependent)    Toileting  Bladder Hygiene: (bee)       Patient recalled and demonstrated avoiding extreme planes of movement with Right LE during ADLs and functional mobility with min cues. She completed supine <> sit with max Ax2, sitting EOB with initial min A with increase to CGA with additional time with B UE support. Attempting to off load R hip WB with L lateral lean. Provided education through multi-modal cues for RW safety including proper positioning, hand placement,  and safety. Patient able to perform sit <> stand with mod Ax2. Poor understanding of RW use and safety. Needing extensive cues for hand placement and to open her eyes. Completed x2 sit <> stand, second trial she was able to side step laterally up the head of the bed to prepare for self care transfers. Pain:  Grimacing with transitional movements    Activity Tolerance:   Fair and requires rest breaks    desat on pulse ox  with finger flexion with slow rebound with PLB on RA. Please refer to the flowsheet for vital signs taken during this treatment. After treatment patient left in no apparent distress:   Supine in bed, Call bell within reach, and Caregiver / family present    COMMUNICATION/COLLABORATION:   The patients plan of care was discussed with: Physical therapist and Registered nurse.      Eric Ybarra OT  Time Calculation: 33 mins

## 2020-10-08 LAB
HEALTH STATUS, XMCV2T: NORMAL
SARS-COV-2, COV2: NOT DETECTED
SOURCE, COVRS: NORMAL
SPECIMEN SOURCE, FCOV2M: NORMAL
SPECIMEN TYPE, XMCV1T: NORMAL

## 2020-10-08 PROCEDURE — 65270000029 HC RM PRIVATE

## 2020-10-08 PROCEDURE — 97110 THERAPEUTIC EXERCISES: CPT

## 2020-10-08 PROCEDURE — 97116 GAIT TRAINING THERAPY: CPT

## 2020-10-08 PROCEDURE — 97530 THERAPEUTIC ACTIVITIES: CPT

## 2020-10-08 PROCEDURE — 74011250637 HC RX REV CODE- 250/637: Performed by: PHYSICIAN ASSISTANT

## 2020-10-08 PROCEDURE — 97535 SELF CARE MNGMENT TRAINING: CPT

## 2020-10-08 RX ORDER — AMOXICILLIN 250 MG
1 CAPSULE ORAL 2 TIMES DAILY
Qty: 60 TAB | Refills: 0 | Status: SHIPPED
Start: 2020-10-08

## 2020-10-08 RX ORDER — HYDROCODONE BITARTRATE AND ACETAMINOPHEN 5; 325 MG/1; MG/1
1 TABLET ORAL
Qty: 10 TAB | Refills: 0 | Status: SHIPPED
Start: 2020-10-08 | End: 2020-10-18

## 2020-10-08 RX ADMIN — DOCUSATE SODIUM 50MG AND SENNOSIDES 8.6MG 1 TABLET: 8.6; 5 TABLET, FILM COATED ORAL at 10:49

## 2020-10-08 RX ADMIN — ACETAMINOPHEN 325 MG: 325 TABLET ORAL at 14:28

## 2020-10-08 RX ADMIN — APIXABAN 2.5 MG: 2.5 TABLET, FILM COATED ORAL at 02:02

## 2020-10-08 RX ADMIN — FUROSEMIDE 40 MG: 20 TABLET ORAL at 10:49

## 2020-10-08 RX ADMIN — DULOXETINE 60 MG: 60 CAPSULE, DELAYED RELEASE ORAL at 10:49

## 2020-10-08 RX ADMIN — ALLOPURINOL 100 MG: 100 TABLET ORAL at 10:49

## 2020-10-08 RX ADMIN — DOXEPIN HYDROCHLORIDE 10 MG: 10 CAPSULE ORAL at 21:57

## 2020-10-08 RX ADMIN — ACETAMINOPHEN 325 MG: 325 TABLET ORAL at 07:02

## 2020-10-08 RX ADMIN — ASPIRIN 81 MG: 81 TABLET, COATED ORAL at 10:48

## 2020-10-08 RX ADMIN — ATORVASTATIN CALCIUM 20 MG: 20 TABLET, FILM COATED ORAL at 21:57

## 2020-10-08 RX ADMIN — ACETAMINOPHEN 325 MG: 325 TABLET ORAL at 02:02

## 2020-10-08 RX ADMIN — POLYETHYLENE GLYCOL 3350 17 G: 17 POWDER, FOR SOLUTION ORAL at 10:50

## 2020-10-08 RX ADMIN — HYDROCODONE BITARTRATE AND ACETAMINOPHEN 1 TABLET: 5; 325 TABLET ORAL at 10:49

## 2020-10-08 RX ADMIN — METOPROLOL SUCCINATE 50 MG: 50 TABLET, EXTENDED RELEASE ORAL at 10:49

## 2020-10-08 RX ADMIN — ACETAMINOPHEN 325 MG: 325 TABLET ORAL at 19:14

## 2020-10-08 RX ADMIN — DOCUSATE SODIUM 50MG AND SENNOSIDES 8.6MG 1 TABLET: 8.6; 5 TABLET, FILM COATED ORAL at 17:50

## 2020-10-08 RX ADMIN — APIXABAN 2.5 MG: 2.5 TABLET, FILM COATED ORAL at 14:28

## 2020-10-08 RX ADMIN — POTASSIUM CHLORIDE 10 MEQ: 750 TABLET, FILM COATED, EXTENDED RELEASE ORAL at 10:49

## 2020-10-08 RX ADMIN — CETIRIZINE HYDROCHLORIDE 10 MG: 10 TABLET, FILM COATED ORAL at 10:49

## 2020-10-08 RX ADMIN — Medication 10 ML: at 07:02

## 2020-10-08 NOTE — PROGRESS NOTES
ORTHO PROGRESS NOTE    2020  Admit Date:   10/5/2020        Subjective:    Oz Mosley is a 80 y.o. WHITE OR  female who is 3 Days Post-Op Procedure(s):  RIGHT TOTAL HIP ARTHROPLASTY  ANTERIOR APPROACH. The patient states mild pain, otherwise is without C/O at this time. Mental status appears to be improved this AM. Pain control is adequate. Has been slow to mobilize with PT. Patient denies CP, SOB, N/V. Vital Signs:    Patient Vitals for the past 8 hrs:   BP Temp Pulse Resp SpO2 Weight   10/08/20 0705      83.3 kg (183 lb 10.3 oz)   10/08/20 0351 103/67 98.2 °F (36.8 °C) (!) 104 16 92 %      Temp (24hrs), Av.1 °F (36.7 °C), Min:97.8 °F (36.6 °C), Max:98.3 °F (36.8 °C)      Pain Control:   Pain Assessment  Pain Scale 1: Numeric (0 - 10)  Pain Intensity 1: 0  Pain Onset 1: post op  Pain Location 1: Hip  Pain Orientation 1: Right  Pain Description 1: Aching  Pain Intervention(s) 1: Medication (see MAR)    LAB:    Recent Labs     10/07/20  0450 10/06/20  0432   HGB 9.0* 7.8*   NA  --  140   K  --  4.4   CL  --  108   CO2  --  25   BUN  --  20   CREA  --  1.20*   GLU  --  132*       Assessment & Physician's Comment:  Dressing is clean, dry, and intact  Respirations unlabored  Abdomen S/NT  Dorsi/plantar flexion 5/5  DP/PT 1+, calves S/NT  Neurovascular checks within normal limits    Procedure:  Procedure(s):  RIGHT TOTAL HIP ARTHROPLASTY  ANTERIOR APPROACH    Plan:  1) Pain Control: Adequate, continue current regimen. 2) Hemodynamics: Stable. 3) Wound: Change dressing PRN. 4) Activity: WBAT, mobilize with assist.  5) DVT Prophylaxis: Eliquis and ASA, SCD's, encourage ankle pump exercise, mobilize. 6) Disposition: Case management for discharge planning. Should be safe for transfer to SNF today. Follow up in Dr Armand Alonso office for post op care in 3 weeks.         Tiffany Ley PA-C

## 2020-10-08 NOTE — PROGRESS NOTES
Problem: Self Care Deficits Care Plan (Adult)  Goal: *Acute Goals and Plan of Care (Insert Text)  Description: FUNCTIONAL STATUS PRIOR TO ADMISSION: Patient was modified independent using a rollator for functional mobility. HOME SUPPORT: Pt lives in [place], receives assistance for medication and meals, does not receive assistance for ADLs prior to surgery    Occupational Therapy Goals  Initiated 10/6/2020  1. Patient will perform lower body ADLs with AE supervision/set-up within 4 day(s). 2.  Patient will perform upper body ADLs standing 5 mins without fatigue or LOB with supervision/set-up within 4 day(s). 3.  Patient will perform toilet transfer with supervision/set-up within 4 day(s). 4.  Patient will perform all aspects of toileting with supervision/set-up within 4 day(s). 5.  Patient will participate in upper extremity therapeutic exercise/activities with supervision/set-up for 10 minutes within 4 day(s). 6.  Patient will utilize energy conservation techniques during functional activities without cues within 4 day(s). Outcome: Progressing Towards Goal  OCCUPATIONAL THERAPY TREATMENT  Patient: Carlota Spangler (84 y.o. female)  Date: 10/8/2020  Diagnosis: Primary localized osteoarthritis of right hip [M16.11]  Degenerative joint disease of right hip [M16.11]   <principal problem not specified>  Procedure(s) (LRB):  RIGHT TOTAL HIP ARTHROPLASTY  ANTERIOR APPROACH (Right) 3 Days Post-Op  Precautions: Fall, WBAT  Chart, occupational therapy assessment, plan of care, and goals were reviewed. ASSESSMENT  Patient continues with skilled OT services and is progressing towards goals. Patient tolerated activity but needed moderate verbal cues and tactile assistance for UB exercises with blue theraband. Patient then progressed from chair to toilet and back to bed. The patient needs overall mod to max A x2 for functional transfers and mobility. ADLs performed at similar assistance.  Recommend SNF placement at DC due to slow progression and level of assistance needed. Acute care OT will continue to follow. Current Level of Function Impacting Discharge (ADLs): MOD to max A    Other factors to consider for discharge: level of assistance needed         PLAN :  Patient continues to benefit from skilled intervention to address the above impairments. Continue treatment per established plan of care. to address goals. Recommend with staff: Jean Waterman for meals, encouragement to participate in ADLs and self-care    Recommend next OT session: Toileting    Recommendation for discharge: (in order for the patient to meet his/her long term goals)  Therapy up to 5 days/week in SNF setting    This discharge recommendation:  Has been made in collaboration with the attending provider and/or case management    IF patient discharges home will need the following DME: TBD following rehab       SUBJECTIVE:   Patient stated it was the arm exercises, I am tired.     OBJECTIVE DATA SUMMARY:   Cognitive/Behavioral Status:  Neurologic State: Alert  Orientation Level: Oriented X4  Cognition: Appropriate decision making; Follows commands  Perception: Appears intact          Functional Mobility and Transfers for ADLs:  Bed Mobility:  Supine to Sit: Moderate assistance;Assist x1    Transfers:  Sit to Stand: Moderate assistance;Assist x2  Functional Transfers  Toilet Transfer : Moderate assistance;Assist x2       Balance:  Sitting: Intact  Sitting - Static: Good (unsupported)  Sitting - Dynamic: Not tested  Standing: Impaired; With support  Standing - Static: Fair;Constant support(RW)  Standing - Dynamic : Fair;Constant support(RW)    ADL Intervention:       Toileting  Toileting Assistance: Maximum assistance  BSC cues, pt able to stand with min A and RW with max A to perform hygiene and don/doff brief       Therapeutic Exercises:   UE Theraband Exercises: biceps/triceps: 10 reps, mod verbal cues and tactile assistance to help  Cues needed consistently for breathing    Progressed from sit<>stand: mod to max A x 2 needed, decreased WB in bilateral LEs as well as decreased tolerance for UE endurance  Toilet transfers; max A x2  Bed mobility: total A    Pain:  Does not rate, pain medication prior to therapy    Activity Tolerance:   Poor  Please refer to the flowsheet for vital signs taken during this treatment.     After treatment patient left in no apparent distress:   Supine in bed, Call bell within reach, Caregiver / family present, and PT in room    COMMUNICATION/COLLABORATION:   The patients plan of care was discussed with: Physical therapist.     Sreedhar Ritchie  Time Calculation: 54 mins

## 2020-10-08 NOTE — PROGRESS NOTES
Bedside and Verbal shift change report given to Norm Wetzel RN (oncoming nurse) by Willis Arango RN (offgoing nurse). Report included the following information SBAR, Kardex and MAR.

## 2020-10-08 NOTE — PROGRESS NOTES
Physician Progress Note      PATIENTSherin Habermann  CSN #:                  [id number]  :                       1933  ADMIT DATE:       10/5/2020 8:24 AM  DISCH DATE:  RESPONDING  PROVIDER #:        YOLY LIU PA-C          QUERY TEXT:    Pt admitted with DJD s/p R hip replacement. Noted documentation of ABLA on 10/6 by Cardiology consultant. If possible, please document in progress notes and discharge summary:    The medical record reflects the following:  Risk Factors: 79 y/o female admitted with DJD s/p R Hip replacement, Hx of A-fib, CAD, HTN, CVA, CHF  Clinical Indicators: Hgb 7.8 from 11.9, Tachycardia HR rend 100-115, BP 83/63, c/o heart racing, per Op Note 200ml blood loss,  Treatment: PRBC Blood transfusion, IVF NS @ 125ml/hr, Cardiology Consult  Options provided:  -- ABLA confirmed POA  -- ABLA confirmed not POA  -- ABLA ruled out  -- Defer to Cardiology consultant documentation regarding ABLA  -- Other - I will add my own diagnosis  -- Disagree - Not applicable / Not valid  -- Disagree - Clinically unable to determine / Unknown  -- Refer to Clinical Documentation Reviewer    PROVIDER RESPONSE TEXT:    I defer to Cardiology consultant regarding documentation of ABLA. Query created by:  Shona Orozco on 10/7/2020 11:02 AM      Electronically signed by:  John Kramer PA-C 10/8/2020 7:29 AM

## 2020-10-08 NOTE — PROGRESS NOTES
AMY: Plan is for discharge to [facility]. COVID test negative 10/7. [facility] phone [phone] transport placed on will call.     LULI Whitehead/CRM

## 2020-10-08 NOTE — NURSE NAVIGATOR
[address]  AR Orthopaedic Pathway Handoff     FROM:                                TO: [facility]                                                      ([address] [facility] or Facility name)  Kit Medina [address]  [address] [place] [address]  Dept: [address]: [phone]                                      Room#:  557/01                                                       Nurse Navigator:  Teena Orellana RN         SITUATION      ASAScore: ASA 3 - Patient with moderate systemic disease with functional limitations    Admitted:  10/5/2020  Hospital Day: 4      Attending Provider:  Inder Castelan MD     Consultations:  IP CONSULT TO CARDIOLOGY    PCP:  Izzy Garvin MD   [phone]     Admitting Dx:  Primary localized osteoarthritis of right hip [M16.11]  Degenerative joint disease of right hip [M16.11]       Active Problems:    Primary localized osteoarthritis of right hip (10/5/2020)      Degenerative joint disease of right hip (10/6/2020)      3 Days Post-Op of   Procedure(s):  RIGHT TOTAL HIP ARTHROPLASTY  ANTERIOR APPROACH   BY: Inder Castelan MD             ON: 10/5/2020                  Code Status: Full Code             Advance Directive? Yes Not W Pt (Send w/patient)     Isolation:  There are currently no Active Isolations       MDRO: No current active infections    BACKGROUND     Allergies:   Allergies   Allergen Reactions    Amoxicillin-Pot Clavulanate Nausea and Vomiting    Cephalexin Nausea and Vomiting    Doxycycline Nausea and Vomiting    Neomycin-Polymyxin-Hc Nausea and Vomiting    Poison Oak Extract Rash    Potassium Clavulanate Other (comments)    Rocephin [Ceftriaxone] Nausea and Vomiting    Sulfamethoxazole-Trimethoprim Nausea and Vomiting       Past Medical History:   Diagnosis Date    Arrhythmia     \"EXTRA BEAT EVERY NOW AND THEN\"    Breast cancer ([facility] [address] 1997    lumpectomy, RADIATION    Cancer ([facility] [address] 2002    dcis left breast 2002    Cancer [facility])     SKIN CANCER    Cardiomyopathy [facility])     Chronic pain     Coronary artery disease involving native coronary artery of native heart without angina pectoris 12/16/2016    multiple stents    CVA (cerebral vascular accident) ([facility] [address] 09/13/2017    Dyslipidemia     Heart failure ([facility] [address]     CHF    High cholesterol     Hives of unknown origin     Hypertension     Local neurodermatitis     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     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    Venous insufficiency 5/17/2011       Past Surgical History:   Procedure Laterality Date    CARDIAC SURG PROCEDURE UNLIST  2015    STENT PLACEMENTS X2- \"LOTS OF STENTS\"    HX BREAST BIOPSY Left 1980`S    HX BREAST LUMPECTOMY  1988    dcis lelf breast    HX CATARACT REMOVAL Bilateral 1980`S    HX COLONOSCOPY      HX CORONARY STENT PLACEMENT  2015    X6    HX GI      COLONOSCOPY    HX HYSTERECTOMY  1960    HX ORTHOPAEDIC      RIGHT TKR    HX OTHER SURGICAL      ORAL    HX OTHER SURGICAL  07/2018    SQUAMOUS CELL CA REMOVED FROM RIGHT FOREARM     TOTAL HIP ARTHROPLASTY Left 2018    VASCULAR SURGERY PROCEDURE UNLIST      varicose veins       Prior to Admission Medications   Prescriptions Last Dose Informant Patient Reported? Taking? DULoxetine (CYMBALTA) 60 mg capsule 10/4/2020 at Unknown time  No Yes   Sig: Take 1 Cap by mouth daily. acetaminophen (TYLENOL) 500 mg tablet 10/4/2020 at Unknown time  Yes Yes   Sig: Take 650 mg by mouth three (3) times daily as needed for Pain. allopurinol (ZYLOPRIM) 300 mg tablet 10/4/2020 at Unknown time  No Yes   Sig: Take 1/2 tablet by mouth daily. For tophaceous gout   apixaban (ELIQUIS) 5 mg tablet 10/2/2020  No No   Sig: Take 1 Tab by mouth two (2) times a day. aspirin delayed-release 81 mg tablet 10/4/2020 at Unknown time  Yes Yes   Sig: Take 81 mg by mouth daily.  FOR CHF   atorvastatin (LIPITOR) 20 mg tablet 10/4/2020 at Unknown time  Yes Yes Sig: Take 20 mg by mouth nightly. cetirizine (ZYRTEC) 10 mg tablet 10/4/2020 at Unknown time  Yes Yes   Sig: Take 10 mg by mouth daily. cholecalciferol, vitamin D3, (VITAMIN D3 PO) 2020  Yes No   Sig: Take 1 Tab by mouth daily. Unsure of dose   clindamycin (CLEOCIN T) 1 % lotion   Yes No   Sig: Apply  to affected area two (2) times daily as needed. use thin film on affected area   diclofenac (VOLTAREN) 1 % gel 10/2/2020  Yes No   Sig: Apply  to affected area two (2) times a day. doxepin (SINEQUAN) 10 mg capsule 10/4/2020 at Unknown time  No Yes   Sig: Take 1 Cap by mouth nightly. fluticasone propionate (FLONASE ALLERGY RELIEF NA) 10/4/2020 at Unknown time  Yes Yes   Si Piru by Nasal route daily. furosemide (LASIX) 40 mg tablet   Yes Yes   Sig: Take 40 mg by mouth daily. lidocaine (LIDODERM) 5 % 10/4/2020 at Unknown time  No Yes   Sig: Apply patch to the affected area for 12 hours a day and remove for 12 hours a day. lisinopril (PRINIVIL, ZESTRIL) 10 mg tablet 10/4/2020 at Unknown time  Yes Yes   Sig: Take 10 mg by mouth daily. metoprolol succinate (TOPROL-XL) 50 mg XL tablet 10/4/2020 at Unknown time  Yes Yes   Sig: Take 75 mg by mouth daily. HOLD FOR BP <100 OR HR <60, AND NOTIFY MD   multivitamin,tx-iron-ca-min (Thera-M) 27-0.4 mg tab 2020  Yes No   Sig: Take 1 Tab by mouth daily. nitroglycerin (NITROSTAT) 0.4 mg SL tablet Unknown at Unknown time  Yes No   Sig: every five (5) minutes as needed. potassium chloride SR (KLOR-CON 10) 10 mEq tablet 10/4/2020 at Unknown time  Yes Yes   Sig: Take 10 mEq by mouth daily. sodium chloride (Ayr Saline) 0.65 % drop Unknown at Unknown time  Yes No   Si Drops three (3) times daily as needed for Congestion. traMADoL (ULTRAM) 50 mg tablet 10/4/2020 at Unknown time  Yes Yes   Sig: Take 50 mg by mouth three (3) times daily as needed for Pain.       Facility-Administered Medications: None       Vaccinations:    Immunization History Administered Date(s) Administered    Influenza High Dose Vaccine PF 10/15/2015    Influenza Vaccine 09/11/2017    Influenza Vaccine (Quad) PF (>6 Mo Flulaval, Fluarix, and >3 Yrs Afluria, Fluzone 50054) 09/25/2018    Pneumococcal Conjugate (PCV-13) 12/16/2016    Pneumococcal Vaccine (Unspecified Type) 06/06/2000    Zoster Vaccine, Live 03/06/2013         ASSESSMENT   Age: 80 y.o. Gender: female        Height:                      Weight:Weight: 83.3 kg (183 lb 10.3 oz)     Patient Vitals for the past 8 hrs:   Pulse BP   10/08/20 1048 (!) 106 117/69            Active Orders   Diet    DIET REGULAR       Orientation: Orientation Level: Oriented X4    Active Lines/Drains:  (Peg Tube / Cartwright / CL or S/L?):no    Urinary Status: Voiding      Last BM: Last Bowel Movement Date: 10/07/20     Skin Integrity: Incision (comment)(R hip)             Mobility: Very limited   Weight Bearing Status: WBAT (Weight Bearing as Tolerated)      Gait Training  Assistive Device: Walker, rolling, Gait belt  Ambulation - Level of Assistance: Moderate assistance, Assist x1, Additional time(2nd person to follow with chair)  Distance (ft): 5 Feet (ft)  Interventions: Safety awareness training, Verbal cues(Manual cues to facilitate weight shift right)     On Anticoagulation?  YES  Eliquis                                         Pain Medications given:  Norco                                     Lab Results   Component Value Date/Time    Glucose 132 (H) 10/06/2020 04:32 AM    Hemoglobin A1c 6.1 (H) 09/28/2020 03:07 PM    INR 1.1 09/28/2020 03:15 PM    INR 1.0 09/21/2018 03:37 PM    HGB 9.0 (L) 10/07/2020 04:50 AM    HGB 7.8 (L) 10/06/2020 04:32 AM    HGB 11.9 09/28/2020 03:15 PM    HGB 11.3 (L) 09/22/2018 02:34 AM       Readmission Risks:  Score:         RECOMMENDATION     See After Visit Summary (AVS) for:  · Discharge instructions  · After [address]   · Medication Reconciliation          [facility] Orthopaedic Nurse Navigator  WILBER ChiangN, RN-BC       Office  [phone]  Cell      [phone]  Fax      [phone]  [email or web address]             . Radha

## 2020-10-08 NOTE — PROGRESS NOTES
Problem: Mobility Impaired (Adult and Pediatric)  Goal: *Acute Goals and Plan of Care (Insert Text)  Description: FUNCTIONAL STATUS PRIOR TO ADMISSION: Patient was modified independent using a rollator and walking only very short distances for functional mobility. HOME SUPPORT PRIOR TO ADMISSION: Patient lived at [facility] living facility where she gets assist with her meals but is able to manage her own mobility and ADLs within her apartment. Physical Therapy Goals  Initiated 10/5/2020    1. Patient will move from supine to sit and sit to supine  in bed with modified independence within 4 days. 2. Patient will perform sit to stand with modified independence within 4 days. 3. Patient will ambulate with modified independence for 150 feet with the least restrictive device within 4 days. 4. Patient will perform TIGRE home exercise program per protocol with modified independence within 4 days. 10/8/2020 1202 by Esperanza Mendes PT  Outcome: Progressing Towards Goal   PHYSICAL THERAPY TREATMENT  Patient: Leonie Mock (69 y.o. female)  Date: 10/8/2020  Diagnosis: Primary localized osteoarthritis of right hip [M16.11]  Degenerative joint disease of right hip [M16.11]   <principal problem not specified>  Procedure(s) (LRB):  RIGHT TOTAL HIP ARTHROPLASTY  ANTERIOR APPROACH (Right) 3 Days Post-Op  Precautions: Fall, WBAT  Chart, physical therapy assessment, plan of care and goals were reviewed. ASSESSMENT  Patient continues with skilled PT services and is progressing towards goals. Pt continues to have difficulty standing upright and fearful of weight shifting to right leg - required physical assist for weight shifting and advancing steps this pm after up in chair for > 4 hours. Encourage patient to increase frequency of OOB/use of BSC - but to limit up in chair to 1-2 hrs at a time. Current Level of Function Impacting Discharge (mobility/balance):  Mod assist x 2 for sit to stand, mod assist of 1 - 2 person for transfers and amb few feet with RW    Other factors to consider for discharge:          PLAN :  Patient continues to benefit from skilled intervention to address the above impairments. Continue treatment per established plan of care. to address goals. Recommendation for discharge: (in order for the patient to meet his/her long term goals)  Therapy up to 5 days/week in SNF setting    This discharge recommendation:  Has been made in collaboration with the attending provider and/or case management    IF patient discharges home will need the following DME: patient owns DME required for discharge       SUBJECTIVE:   Patient stated I just can't put weight on my right leg.     OBJECTIVE DATA SUMMARY:   Critical Behavior:  Neurologic State: Alert  Orientation Level: Oriented X4  Cognition: Appropriate decision making, Follows commands     Functional Mobility Training:  Bed Mobility:     Supine to Sit: Moderate assistance;Assist x1  Sit to Supine: Moderate assistance;Assist x1(to  lift legs into bed)  Scooting: Moderate assistance        Transfers:  Sit to Stand: Moderate assistance;Assist x2  Stand to Sit: Minimum assistance;Assist x2        Bed to Chair: Moderate assistance;Assist x2                    Balance:  Sitting: Intact  Sitting - Static: Good (unsupported)  Sitting - Dynamic: Not tested  Standing: Impaired; With support  Standing - Static: Fair;Constant support  Standing - Dynamic : Poor;Constant support  Ambulation/Gait Training:  Distance (ft): 5 Feet (ft)  Assistive Device: Walker, rolling;Gait belt  Ambulation - Level of Assistance:  Moderate assistance;Assist x1;Additional time(2nd person to follow with chair)        Gait Abnormalities: Antalgic;Decreased step clearance(difficulty lifting right leg to take step)  Right Side Weight Bearing: As tolerated     Base of Support: Widened;Shift to left  Stance: Right decreased  Speed/Juliana: Pace decreased (<100 feet/min)  Step Length: Right shortened;Left shortened  Swing Pattern: Right asymmetrical     Interventions: Safety awareness training;Verbal cues(Manual cues to facilitate weight shift right)     Pain Rating:  Right hip 10/10 when standing    Activity Tolerance:   Poor - limited by pain  Please refer to the flowsheet for vital signs taken during this treatment. After treatment patient left in no apparent distress:   Supine in bed, Call bell within reach, and Caregiver / family present    COMMUNICATION/COLLABORATION:   The patients plan of care was discussed with: Registered nurse.      Viviana Martínez, PT   Time Calculation: 30 mins

## 2020-10-08 NOTE — PROGRESS NOTES
Problem: Mobility Impaired (Adult and Pediatric)  Goal: *Acute Goals and Plan of Care (Insert Text)  Description: FUNCTIONAL STATUS PRIOR TO ADMISSION: Patient was modified independent using a rollator and walking only very short distances for functional mobility. HOME SUPPORT PRIOR TO ADMISSION: Patient lived at [facility] living facility where she gets assist with her meals but is able to manage her own mobility and ADLs within her apartment. Physical Therapy Goals  Initiated 10/5/2020    1. Patient will move from supine to sit and sit to supine  in bed with modified independence within 4 days. 2. Patient will perform sit to stand with modified independence within 4 days. 3. Patient will ambulate with modified independence for 150 feet with the least restrictive device within 4 days. 4. Patient will perform TIGRE home exercise program per protocol with modified independence within 4 days. Outcome: Progressing Towards Goal  PHYSICAL THERAPY TREATMENT  Patient: Ashley Ferguson (31 y.o. female)  Date: 10/8/2020  Diagnosis: Primary localized osteoarthritis of right hip [M16.11]  Degenerative joint disease of right hip [M16.11]   <principal problem not specified>  Procedure(s) (LRB):  RIGHT TOTAL HIP ARTHROPLASTY  ANTERIOR APPROACH (Right) 3 Days Post-Op  Precautions: Fall, WBAT  Chart, physical therapy assessment, plan of care and goals were reviewed. ASSESSMENT  Patient continues with skilled PT services and is progressing towards goals. Pt overall improved today for functional mobility - continues to require excessive time to complete task - however, requiring less physical assist.        Current Level of Function Impacting Discharge (mobility/balance):  Mod assist x 1 for supine to sit; sitting edge of bed indep; mod assist x 2 for sit to stand; amb 5 feet with RW with min to mod assist x 1 (2nd person chair following)    Other factors to consider for discharge:          PLAN :  Patient continues to benefit from skilled intervention to address the above impairments. Continue treatment per established plan of care. to address goals. Recommendation for discharge: (in order for the patient to meet his/her long term goals)  Therapy up to 5 days/week in SNF setting    This discharge recommendation:  Has been made in collaboration with the attending provider and/or case management    IF patient discharges home will need the following DME: patient owns DME required for discharge       SUBJECTIVE:   Patient stated my arms get so tired.     OBJECTIVE DATA SUMMARY:   Critical Behavior:     Orientation Level: Oriented X4        Functional Mobility Training:  Bed Mobility:     Supine to Sit: Moderate assistance;Assist x1              Transfers:  Sit to Stand: Moderate assistance;Assist x2  Stand to Sit: Minimum assistance;Assist x2                             Balance:  Sitting: Intact  Sitting - Static: Good (unsupported)  Sitting - Dynamic: Not tested  Standing: Impaired; With support  Standing - Static: Fair;Constant support(RW)  Standing - Dynamic : Fair;Constant support(RW)  Ambulation/Gait Training:  Distance (ft): 5 Feet (ft)  Assistive Device: Walker, rolling;Gait belt  Ambulation - Level of Assistance:  Moderate assistance;Assist x1;Additional time(2nd person to follow with chair)        Gait Abnormalities: Antalgic;Decreased step clearance(difficulty lifting right leg to take step)  Right Side Weight Bearing: As tolerated     Base of Support: Widened;Shift to left  Stance: Right decreased  Speed/Juliana: Pace decreased (<100 feet/min)  Step Length: Right shortened;Left shortened  Swing Pattern: Right asymmetrical     Interventions: Safety awareness training;Verbal cues(Manual cues to facilitate weight shift right)           Therapeutic Exercises:   SUPINE  EXERCISES   Sets   Reps   Active Active Assist   Passive Self ROM   Comments   Ankle Pumps  5 [x]                                        [] []                                        []                                           Quad Sets   []                                        []                                        []                                        []                                           Heel Slides  5 []                                        [x]                                        []                                        []                                           Hip Abduction   []                                        []                                        []                                        []                                           Glut Sets   []                                        []                                        []                                        []                                              []                                        []                                        []                                        []                                              []                                        []                                        []                                        []                                             Pain Rating:  Pre- treatment -Right Hip 8/10   Post-treatment - Right Hip 10/10    Activity Tolerance:   Fair - improving - up to [facility] and bedside chair   Please refer to the flowsheet for vital signs taken during this treatment. After treatment patient left in no apparent distress:   Sitting in chair, Call bell within reach, and Pt instructed to request nursing assist for all mobility    COMMUNICATION/COLLABORATION:   The patients plan of care was discussed with: Registered nurse.      Daine Damian, PT   Time Calculation: 30 mins

## 2020-10-09 VITALS
BODY MASS INDEX: 37.81 KG/M2 | RESPIRATION RATE: 18 BRPM | OXYGEN SATURATION: 98 % | DIASTOLIC BLOOD PRESSURE: 62 MMHG | WEIGHT: 193.6 LBS | TEMPERATURE: 98.4 F | HEART RATE: 94 BPM | SYSTOLIC BLOOD PRESSURE: 102 MMHG

## 2020-10-09 PROCEDURE — 74011250637 HC RX REV CODE- 250/637: Performed by: PHYSICIAN ASSISTANT

## 2020-10-09 PROCEDURE — 97535 SELF CARE MNGMENT TRAINING: CPT

## 2020-10-09 RX ADMIN — FUROSEMIDE 40 MG: 20 TABLET ORAL at 09:28

## 2020-10-09 RX ADMIN — Medication 10 ML: at 07:14

## 2020-10-09 RX ADMIN — APIXABAN 2.5 MG: 2.5 TABLET, FILM COATED ORAL at 01:01

## 2020-10-09 RX ADMIN — ASPIRIN 81 MG: 81 TABLET, COATED ORAL at 09:27

## 2020-10-09 RX ADMIN — ALLOPURINOL 100 MG: 100 TABLET ORAL at 09:28

## 2020-10-09 RX ADMIN — APIXABAN 2.5 MG: 2.5 TABLET, FILM COATED ORAL at 12:13

## 2020-10-09 RX ADMIN — Medication 10 ML: at 01:03

## 2020-10-09 RX ADMIN — DULOXETINE 60 MG: 60 CAPSULE, DELAYED RELEASE ORAL at 09:27

## 2020-10-09 RX ADMIN — ACETAMINOPHEN 325 MG: 325 TABLET ORAL at 01:01

## 2020-10-09 RX ADMIN — LISINOPRIL 10 MG: 10 TABLET ORAL at 09:27

## 2020-10-09 RX ADMIN — METOPROLOL SUCCINATE 50 MG: 50 TABLET, EXTENDED RELEASE ORAL at 09:28

## 2020-10-09 RX ADMIN — ACETAMINOPHEN 325 MG: 325 TABLET ORAL at 07:14

## 2020-10-09 RX ADMIN — POTASSIUM CHLORIDE 10 MEQ: 750 TABLET, FILM COATED, EXTENDED RELEASE ORAL at 09:27

## 2020-10-09 RX ADMIN — CETIRIZINE HYDROCHLORIDE 10 MG: 10 TABLET, FILM COATED ORAL at 09:27

## 2020-10-09 NOTE — PROGRESS NOTES
Problem: Patient Education: Go to Patient Education Activity  Goal: Patient/Family Education  Outcome: Progressing Towards Goal     Problem: Hip Replacement: Post-Op Day 2  Goal: Activity/Safety  Outcome: Progressing Towards Goal  Goal: Diagnostic Test/Procedures  Outcome: Progressing Towards Goal  Goal: Nutrition/Diet  Outcome: Progressing Towards Goal  Goal: Medications  Outcome: Progressing Towards Goal  Goal: Respiratory  Outcome: Progressing Towards Goal  Goal: Treatments/Interventions/Procedures  Outcome: Progressing Towards Goal  Goal: Psychosocial  Outcome: Progressing Towards Goal  Goal: *Met physical therapy criteria for discharge to the next level of care  Outcome: Progressing Towards Goal  Goal: *Optimal pain control with oral analgesia  Outcome: Progressing Towards Goal  Goal: *Hemodynamically stable  Outcome: Progressing Towards Goal  Goal: *Tolerating diet  Outcome: Progressing Towards Goal  Goal: *Verbalizes understanding of any indicated hip precautions  Outcome: Progressing Towards Goal  Goal: *Patient verbalizes understanding of discharge instructions  Outcome: Progressing Towards Goal     Problem: Hypertension  Goal: *Blood pressure within specified parameters  Outcome: Progressing Towards Goal  Goal: *Fluid volume balance  Outcome: Progressing Towards Goal  Goal: *Labs within defined limits  Outcome: Progressing Towards Goal     Problem: Patient Education: Go to Patient Education Activity  Goal: Patient/Family Education  Outcome: Progressing Towards Goal     Problem: Pressure Injury - Risk of  Goal: *Prevention of pressure injury  Description: Document Esau Scale and appropriate interventions in the flowsheet.   Outcome: Progressing Towards Goal  Note: Pressure Injury Interventions:  Sensory Interventions: Assess changes in LOC, Check visual cues for pain, Discuss PT/OT consult with provider, Float heels, Keep linens dry and wrinkle-free, Maintain/enhance activity level, Minimize linen layers    Moisture Interventions: Absorbent underpads, Check for incontinence Q2 hours and as needed    Activity Interventions: Increase time out of bed, Pressure redistribution bed/mattress(bed type), PT/OT evaluation    Mobility Interventions: Float heels, Pressure redistribution bed/mattress (bed type), PT/OT evaluation, HOB 30 degrees or less    Nutrition Interventions: Document food/fluid/supplement intake, Offer support with meals,snacks and hydration    Friction and Shear Interventions: Feet elevated on foot rest, HOB 30 degrees or less, Minimize layers                Problem: Patient Education: Go to Patient Education Activity  Goal: Patient/Family Education  Outcome: Progressing Towards Goal     Problem: Falls - Risk of  Goal: *Absence of Falls  Description: Document Xiang Fall Risk and appropriate interventions in the flowsheet.   Outcome: Progressing Towards Goal  Note: Fall Risk Interventions:  Mobility Interventions: Communicate number of staff needed for ambulation/transfer, Patient to call before getting OOB, PT Consult for mobility concerns, PT Consult for assist device competence, Strengthening exercises (ROM-active/passive), Utilize walker, cane, or other assistive device, Utilize gait belt for transfers/ambulation    Mentation Interventions: Adequate sleep, hydration, pain control, Door open when patient unattended, Family/sitter at bedside, Update white board    Medication Interventions: Patient to call before getting OOB, Teach patient to arise slowly, Utilize gait belt for transfers/ambulation    Elimination Interventions: Call light in reach, Patient to call for help with toileting needs    History of Falls Interventions: Door open when patient unattended, Evaluate medications/consider consulting pharmacy, Utilize gait belt for transfer/ambulation         Problem: Patient Education: Go to Patient Education Activity  Goal: Patient/Family Education  Outcome: Progressing Towards Goal

## 2020-10-09 NOTE — DISCHARGE INSTRUCTIONS
Discharge Instructions Hip Replacement  Dr. Tracy Krueger      Patient Name  Jenny Casarez  Date of procedure  10/5/2020    Procedure  Procedure(s):  RIGHT TOTAL HIP ARTHROPLASTY  ANTERIOR APPROACH  Surgeon  Surgeon(s) and Role:     Celeste Pak MD - Primary  Date of discharge: 10/9/20  PCP: Mando Lerma MD    Follow up care   Follow up visit with Dr. Tracy Krueger in 3 weeks. Call [phone]  to make an appointment    Activity at home   AVOID sudden and extreme movement of your hip (surgical leg)   Take a short walk every hour; except at night when sleeping   Do your Home Exercise Program 3 times every day    After exercising lie down and elevate your leg on pillows for 15-30 minutes to decrease swelling   Refer to your patient notebook for more information    Bathing and caring for your incision   You may take a shower with your waterproof dressing on your hip.  The waterproof dressing is to stay on your hip for 7 days.  On the 7th day have someone gently peel the dressing off by lifting the edge and stretching it to break the seal.   You may then leave your incision open to air unless you see drainage from your hip. Preventing blood clots   resume Eliquis 5 mg twice a day as prescribed.  Call Dr. Tracy Krueger if you have side effects of blood thinning medication: bleeding, bruising, upset stomach, or diarrhea.  Call Dr. Tracy Krueger for signs of a blood clot in your leg: calf pain, tenderness, redness, swelling of lower leg    Preventing lung congestion   Use your incentive spirometer 4 times a day; do 10 repetitions each time   Remember to keep the small blue ball between the two arrows when taking a slow, deep breath           Pain Management   Get up and walk a short distance to relieve pain and stiffness.  Place ice wrap on your hip except when you are walking.  The gel ice packs should be changed about every 4 hours   Elevate your leg on pillows for 15-30 minutes    Take Tylenol 650mg (take two 325mg tablets) every 6 hours for pain.  If needed, take a narcotic pain pill every 4-6 hours as prescribed.  Take all medications with a small amount of food.  As your pain decreases, take the narcotics less often or take ½ of a pill   Call Dr. Arlene Walker if you have side effects from your narcotic pain medication: itching, drowsiness, dizziness, upset stomach, dry mouth, constipation or if you medication is not relieving your pain. Diet after surgery   You may resume your normal diet. Include vegetables, fruit, whole grains, lean meats, and low-fat dairy products. Eat food high in fiber    Drink plenty of fluids, including 8 cups of water daily   Take Senokot-s twice a day to prevent constipation    Avoid after surgery   Do not take any over-the-counter medication for pain except Tylenol   Do not take more than 3000mg (3 grams) of Tylenol in 24 hours.  Do not drink alcoholic beverages   Do not smoke   Do not drive until seen for follow up appointment   Do not place frozen gel pack directly on your skin. It can cause frostbite.  Do not take a tub bath, swim or get in a hot tub for 6 weeks  Prevention of falls and safety at home   Set up an area where you can rest comfortably leaving space around furniture to allow you to walk with your walker   Keep stairs, hallways and bathrooms well lit; especially at night   Arrange for care for your pets   Keep your home free of clutter        Call Dr. Arlene Walker at [phone] for:   Pain that is not relieved by pain medication, ice and activity   Side effects of medications   Increased/spread of bruising   Warning signs of infection:  ? persistent fever greater than 100 degrees  ?  shaking or chills  ? increased redness, tenderness, swelling or drainage from incision  ? increased pain during activity or rest   Warning signs of a blood clot in your leg:  ? increased pain in your calf  ? tenderness or redness  ? increased swelling or knee, calf, ankle or foot    Call [phone] after 5pm or on a weekend.  The on call physician will return your phone call      Call your Primary Care Doctor for:    Concerns about your medical conditions such as diabetes, high blood pressure, asthma, congestive heart failure   Blood sugars greater than 180   Persistent headache or dizziness   Coughing or congestion   Constipation or diarrhea   Burning when you go to the bathroom   Abnormal heart rate (fast or slow)      Call 911 and go to the nearest hospital for:    Sudden increased shortness of breath   Sudden onset of chest pain   Difficulty breathing   Localized chest pain with coughing or taking a deep breath

## 2020-10-09 NOTE — PROGRESS NOTES
Problem: Self Care Deficits Care Plan (Adult)  Goal: *Acute Goals and Plan of Care (Insert Text)  Description: FUNCTIONAL STATUS PRIOR TO ADMISSION: Patient was modified independent using a rollator for functional mobility. HOME SUPPORT: Pt lives in [place], receives assistance for medication and meals, does not receive assistance for ADLs prior to surgery    Occupational Therapy Goals  Initiated 10/6/2020  1. Patient will perform lower body ADLs with AE supervision/set-up within 4 day(s). 2.  Patient will perform upper body ADLs standing 5 mins without fatigue or LOB with supervision/set-up within 4 day(s). 3.  Patient will perform toilet transfer with supervision/set-up within 4 day(s). 4.  Patient will perform all aspects of toileting with supervision/set-up within 4 day(s). 5.  Patient will participate in upper extremity therapeutic exercise/activities with supervision/set-up for 10 minutes within 4 day(s). 6.  Patient will utilize energy conservation techniques during functional activities without cues within 4 day(s). Outcome: Progressing Towards Goal     OCCUPATIONAL THERAPY TREATMENT  Patient: Renae Larios (1 y.o. female)  Date: 10/9/2020  Diagnosis: Primary localized osteoarthritis of right hip [M16.11]  Degenerative joint disease of right hip [M16.11]   <principal problem not specified>  Procedure(s) (LRB):  RIGHT TOTAL HIP ARTHROPLASTY  ANTERIOR APPROACH (Right) 4 Days Post-Op  Precautions: Fall, WBAT  Chart, occupational therapy assessment, plan of care, and goals were reviewed. ASSESSMENT  Patient continues with skilled OT services and is progressing towards goals. Pt seen by request of nursing to assist with toileting activities. Pt requires mod A x 2 persons for transfer to chair and is greatly limited by pain with all activities. Pt needing toileting so assisted to bed pan by nursing. Nursing needing assist to transition off bed pan.  Pt provided with education for rolling technique in which she did well. Pt needing total A for hygiene and max A to maintain position to complete hygiene. Repositioned in bed for comfort following. Pt will discharge to rehab setting as she is not yet safe to return to [place]. Recommend continued OT to progress independence with ADL activities overall. Current Level of Function Impacting Discharge (ADLs): max A x 2 for toileting    Other factors to consider for discharge: pain, debility, fall         PLAN :  Patient continues to benefit from skilled intervention to address the above impairments. Continue treatment per established plan of care. to address goals. Recommend with staff: bed in chair position TID for meals versus OOB in chair as appropriate. Recommend next OT session: EOB ADL, grooming, attempting BSC erich    Recommendation for discharge: (in order for the patient to meet his/her long term goals)  Therapy up to 5 days/week in SNF setting    This discharge recommendation:  Has been made in collaboration with the attending provider and/or case management    IF patient discharges home will need the following DME: none       SUBJECTIVE:   Patient stated I am leaving today.     OBJECTIVE DATA SUMMARY:   Cognitive/Behavioral Status:  Neurologic State: Alert  Orientation Level: Oriented X4  Cognition: Appropriate for age attention/concentration  Perception: Appears intact  Perseveration: No perseveration noted  Safety/Judgement: Good awareness of safety precautions    Functional Mobility and Transfers for ADLs:  Bed Mobility:       Transfers:     Functional Transfers  Bathroom Mobility: (recommend bed pan as she continues to be max A x 2 person )  Toilet Transfer : Maximum assistance(rolling in bed for bed pan placement)       Balance:  Sitting: Intact  Sitting - Static: Good (unsupported)    ADL Intervention:     Toileting  Toileting Assistance: Total assistance(dependent)(bed pan for toileting needs)  Bladder Hygiene:  Total assistance (dependent)  Bowel Hygiene: Total assistance (dependent)  Clothing Management: Total assistance (dependent)    Cognitive Retraining  Safety/Judgement: Good awareness of safety precautions    Pain:  Moderate pain with rolling    Activity Tolerance:   Good  Please refer to the flowsheet for vital signs taken during this treatment. After treatment patient left in no apparent distress:   Sitting in chair and Call bell within reach    COMMUNICATION/COLLABORATION:   The patients plan of care was discussed with: Physical therapist and Registered nurse.      Selina Bertrand OT  Time Calculation: 9 mins

## 2020-10-09 NOTE — PROGRESS NOTES
TRANSFER - OUT REPORT:    Verbal report given to Arianna(name) on Kiarra Das  being transferred to [facility]) for routine progression of care       Report consisted of patients Situation, Background, Assessment and   Recommendations(SBAR). Information from the following report(s) SBAR, Kardex, Intake/Output and MAR was reviewed with the receiving nurse. Lines:   Peripheral IV 10/05/20 Anterior; Left Forearm (Active)   Site Assessment Clean, dry, & intact 10/09/20 0830   Phlebitis Assessment 0 10/09/20 0830   Infiltration Assessment 0 10/09/20 0830   Dressing Status Clean, dry, & intact 10/09/20 0830   Dressing Type Transparent 10/09/20 0830   Hub Color/Line Status Capped 10/09/20 0830   Action Taken Open ports on tubing capped 10/09/20 0830   Alcohol Cap Used Yes 10/09/20 0830        Opportunity for questions and clarification was provided.

## 2020-10-09 NOTE — PROGRESS NOTES
AMY: Plan is for discharge to [facility] today. COVID test negative 10/7. [facility] phone [phone] transport set for 1 PM.    Discharge folder located on hard chart. Rn to follow with discharge papers, MAR, kardex and call report to #[phone]    1:14 PM: CM received call from [facility], updated ETA is 2:30 PM. RN is aware. Transition of Care Plan to SNF/Rehab    SNF/Rehab Transition:  Patient has been accepted to [facility] and meets criteria for admission. Patient will transported by [facility] expected to leave at 1 PM    Communication to Patient/Family:  Met with patient and daughter Rayna Seek  #[phone] and they are agreeable to the transition plan. Communication to SNF/Rehab:  Bedside RN, Matt Powell , has been notified to update the transition plan to the facility and call report (phone number #). Discharge information has been updated on the AVS.     Discharge instructions to be fax'd to facility at [facility] # ). Nursing Please include all hard scripts for controlled substances, med rec and dc summary, and AVS in packet. SNF/Rehab Transition:  Patient to follow-up with Home Health:  [facility], other  ,none)  PCP/Specialist:    Reviewed and confirmed with Jose Luis thomas in admissions they can manage the patient care needs for the following:     Erin Mayorga with (X) only those applicable:    Medication:  [x]  Medications will be available at the facility  []  IV Antibiotics   []  Controlled Substance - hard copy to be sent with patient   []  Weekly Labs   Documents:  [x] Hard RX  [x] MAR  [x] Kardex  [x] AVS  [x]Transfer Summary  [x]Discharge   Equipment:  []  CPAP/BiPAP  []  Wound Vacuum  []  Cartwright or Urinary Device  []  PICC/Central Line  []  Nebulizer  []  Ventilator   Treatment:  []Isolation (for MRSA, VRE, etc.)  []Surgical Drain Management  []Tracheostomy Care  []Dressing Changes  []Dialysis with transportation and chair time   []PEG Care  []Oxygen  []Daily Weights for Heart Failure   Dietary:  []Any diet limitations  []Tube Feedings   []Total Parenteral Management (TPN)   Eligible for Medicaid Long Term Services and Supports  Yes:  [] Eligible for medical assistance or will become eligible within 180 days and UAI completed. [] Provider/Patient and/or support system has requested screening. [] UAI copy provided to patient or responsible party,   [] UAI unavailable at discharge will send once processed to SNF provider. [] UAI unavailable at discharged mailed to patient  No:   [x] Private pay and is not financially eligible for Medicaid within the next 180 days. [] Reside out-of-state. [] A residents of a state owned/operated facility that is licensed  by [facility] or [facility]  [] Enrollment in [facility] hospice services  []  [address]  [] Patient /Family declines to have screening completed or provide financial information for screening       Financial Resources:  Medicaid    [] Initiated and application pending   [] Full coverage     Advanced Care Plan:  []Surrogate Decision Maker of Care  []POA  []Communicated Code Status  (DDNR\", \"Full\")    Other    Medicare pt has received, reviewed, and signed 2nd IM letter informing them of their right to appeal the discharge. Signed copied has been placed on pt bedside chart.

## 2020-10-09 NOTE — PROGRESS NOTES
ORTHO PROGRESS NOTE    2020  Admit Date:   10/5/2020        Subjective:    Betzy Medina is a 80 y.o. WHITE OR  female who is 4 Days Post-Op Procedure(s):  RIGHT TOTAL HIP ARTHROPLASTY  ANTERIOR APPROACH. The patient states mild pain, otherwise is without C/O at this time. Pain control is adequate. Has been slow to mobilize with PT. The patient denies CP, SOB, N/V. Vital Signs:    Patient Vitals for the past 8 hrs:   BP Temp Pulse Resp SpO2 Weight   10/09/20 0340      87.8 kg (193 lb 9.6 oz)   10/09/20 0248 100/65 97.9 °F (36.6 °C) 91 16 96 %      Temp (24hrs), Av.2 °F (36.8 °C), Min:97.9 °F (36.6 °C), Max:98.4 °F (36.9 °C)      Pain Control:   Pain Assessment  Pain Scale 1: Numeric (0 - 10)  Pain Intensity 1: 0  Pain Onset 1: post op  Pain Location 1: Hip  Pain Orientation 1: Right  Pain Description 1: Sore  Pain Intervention(s) 1: Elevation, Emotional support, Distraction, Cold pack    LAB:    Recent Labs     10/07/20  0450   HGB 9.0*       Assessment & Comments:  Dressing is clean, dry, and intact  Respirations unlabored  Abdomen S/NT  Dorsi/plantar flexion 5/5  DP/PT 1+, calves S/NT  Neurovascular checks within normal limits    Procedure:  Procedure(s):  RIGHT TOTAL HIP ARTHROPLASTY  ANTERIOR APPROACH   Acute Post Operative Blood Loss Anemia Necessitating Blood Transfusion    Plan:  1) Pain Control: Continue current regimen. 2) Hemodynamics: Stable. 3) Wound: Change dressing PRN. 4) Activity: WBAT, mobilize with assist.  5) DVT Prophylaxis: Eliquis, ASA, SCD's, ankle pump exercise, mobilize. 6) Disposition: Transfer to [facility] today. Follow up in Dr Dre Minaya office for post op care in 3 weeks.         Perla Kilgore PA-C

## 2020-10-09 NOTE — PROGRESS NOTES
Spoke with RISHI Liu and asked about a prescription for norco 5/325 that was listed on her discharge and [facility] called and asked about hard script and that they didn't receive it. I told them that the patient would get very confused on this medication and we were instructed only to give tylenol. RISHI Grossman confirmed this by phone with me that a script for Guillermina Hajir was not written and only tylenol was to be given.

## 2020-10-13 NOTE — DISCHARGE SUMMARY
@8RSZV@ [address] [place] [address]    DISCHARGE SUMMARY     Patient: Johnnie Smith                             Medical Record Number: [id number]                : 1933  Age: 80 y.o. Admit Date: 10/5/2020  Discharge Date: 10/9/2020  Admission Diagnosis: Primary localized osteoarthritis of right hip [M16.11]  Degenerative joint disease of right hip [M16.11]  Discharge Diagnosis: OSTEOARTHRITIS  Procedures: Procedure(s):  RIGHT TOTAL HIP ARTHROPLASTY  ANTERIOR APPROACH  Surgeon: Jeromy Cordova MD  Assistants: Asael Levy PA-C  Anesthesia: general  Complications: None     History of Present Illness:  Johnnie Smith is a 80 y.o. female with a history of Right hip pain, swelling, and marked loss of function. Despite conservative management and after clinical and radiographic evaluation, it was determined that she suffered from end-stage osteoarthritis and would benefit from Procedure(s):  RIGHT TOTAL HIP ARTHROPLASTY  ANTERIOR APPROACH, which she consented to undergo after a discussion of the risks, benefits, alternatives, rehab concerns, and potential complications of surgery. Hospital Course:  Johnnie Smith tolerated the procedure well. She was transferred  to the recovery room in stable condition. After a brief stay the patient was then transferred to the Joint Replacement Unit at [address] [place] [address].  On postoperative day #1, the dressing was clean and dry, she was neurovascularly intact. The patient was afebrile and vital signs were stable. Calves were soft and non-tender bilaterally. On postoperative day  # 2, the patient was tolerating a regular diet and making satisfactory progress with physical therapy.   Hemoglobin and INR prior to discharge were     Lab Results   Component Value Date/Time    HGB 9.0 (L) 10/07/2020 04:50 AM    INR 1.1 2020 03:15 PM      Johnnie Smith made satisfactory progress with physical therapy and was discharged to SNF in stable condition on postoperative day 4. She was provided with routine postoperative instructions and advised to follow up in my office in 3 weeks following discharge from the hospital.  She was prescribed Eliquis and ASA for DVT. Discharge Medications:  Cannot display discharge medications since this patient is not currently admitted.       Signed by: Deann Champagne MD  10/13/2020

## 2020-12-28 ENCOUNTER — APPOINTMENT (OUTPATIENT)
Dept: GENERAL RADIOLOGY | Age: 85
End: 2020-12-28
Attending: EMERGENCY MEDICINE
Payer: MEDICARE

## 2020-12-28 ENCOUNTER — HOSPITAL ENCOUNTER (EMERGENCY)
Age: 85
Discharge: HOME | End: 2020-12-28
Attending: EMERGENCY MEDICINE
Payer: MEDICARE

## 2020-12-28 VITALS
SYSTOLIC BLOOD PRESSURE: 114 MMHG | BODY MASS INDEX: 33.38 KG/M2 | HEART RATE: 85 BPM | OXYGEN SATURATION: 94 % | WEIGHT: 170 LBS | HEIGHT: 60 IN | TEMPERATURE: 97.8 F | DIASTOLIC BLOOD PRESSURE: 78 MMHG | RESPIRATION RATE: 18 BRPM

## 2020-12-28 DIAGNOSIS — M25.512 ACUTE PAIN OF LEFT SHOULDER: ICD-10-CM

## 2020-12-28 DIAGNOSIS — R07.89 ATYPICAL CHEST PAIN: ICD-10-CM

## 2020-12-28 DIAGNOSIS — M75.82 ROTATOR CUFF TENDONITIS, LEFT: Primary | ICD-10-CM

## 2020-12-28 LAB
ALBUMIN SERPL-MCNC: 3.1 G/DL (ref 3.5–5)
ALBUMIN/GLOB SERPL: 0.7 {RATIO} (ref 1.1–2.2)
ALP SERPL-CCNC: 120 U/L (ref 45–117)
ALT SERPL-CCNC: 14 U/L (ref 12–78)
ANION GAP SERPL CALC-SCNC: 3 MMOL/L (ref 5–15)
AST SERPL-CCNC: 21 U/L (ref 15–37)
ATRIAL RATE: 72 BPM
BASOPHILS # BLD: 0 K/UL (ref 0–0.1)
BASOPHILS NFR BLD: 0 % (ref 0–1)
BILIRUB SERPL-MCNC: 0.5 MG/DL (ref 0.2–1)
BUN SERPL-MCNC: 14 MG/DL (ref 6–20)
BUN/CREAT SERPL: 12 (ref 12–20)
CALCIUM SERPL-MCNC: 9.1 MG/DL (ref 8.5–10.1)
CALCULATED P AXIS, ECG09: 46 DEGREES
CALCULATED R AXIS, ECG10: -24 DEGREES
CALCULATED T AXIS, ECG11: 19 DEGREES
CHLORIDE SERPL-SCNC: 106 MMOL/L (ref 97–108)
CO2 SERPL-SCNC: 32 MMOL/L (ref 21–32)
COMMENT, HOLDF: NORMAL
CREAT SERPL-MCNC: 1.15 MG/DL (ref 0.55–1.02)
DIAGNOSIS, 93000: NORMAL
DIFFERENTIAL METHOD BLD: ABNORMAL
EOSINOPHIL # BLD: 0.2 K/UL (ref 0–0.4)
EOSINOPHIL NFR BLD: 2 % (ref 0–7)
ERYTHROCYTE [DISTWIDTH] IN BLOOD BY AUTOMATED COUNT: 16.1 % (ref 11.5–14.5)
GLOBULIN SER CALC-MCNC: 4.5 G/DL (ref 2–4)
GLUCOSE SERPL-MCNC: 105 MG/DL (ref 65–100)
HCT VFR BLD AUTO: 38.5 % (ref 35–47)
HGB BLD-MCNC: 11.7 G/DL (ref 11.5–16)
IMM GRANULOCYTES # BLD AUTO: 0 K/UL (ref 0–0.04)
IMM GRANULOCYTES NFR BLD AUTO: 0 % (ref 0–0.5)
LYMPHOCYTES # BLD: 2.9 K/UL (ref 0.8–3.5)
LYMPHOCYTES NFR BLD: 28 % (ref 12–49)
MCH RBC QN AUTO: 30.5 PG (ref 26–34)
MCHC RBC AUTO-ENTMCNC: 30.4 G/DL (ref 30–36.5)
MCV RBC AUTO: 100.5 FL (ref 80–99)
MONOCYTES # BLD: 0.9 K/UL (ref 0–1)
MONOCYTES NFR BLD: 8 % (ref 5–13)
NEUTS SEG # BLD: 6.3 K/UL (ref 1.8–8)
NEUTS SEG NFR BLD: 62 % (ref 32–75)
NRBC # BLD: 0 K/UL (ref 0–0.01)
NRBC BLD-RTO: 0 PER 100 WBC
P-R INTERVAL, ECG05: 174 MS
PLATELET # BLD AUTO: 383 K/UL (ref 150–400)
PMV BLD AUTO: 9.3 FL (ref 8.9–12.9)
POTASSIUM SERPL-SCNC: 4.1 MMOL/L (ref 3.5–5.1)
PROT SERPL-MCNC: 7.6 G/DL (ref 6.4–8.2)
Q-T INTERVAL, ECG07: 396 MS
QRS DURATION, ECG06: 82 MS
QTC CALCULATION (BEZET), ECG08: 433 MS
RBC # BLD AUTO: 3.83 M/UL (ref 3.8–5.2)
SAMPLES BEING HELD,HOLD: NORMAL
SODIUM SERPL-SCNC: 141 MMOL/L (ref 136–145)
TROPONIN I SERPL-MCNC: <0.05 NG/ML
TROPONIN I SERPL-MCNC: <0.05 NG/ML
VENTRICULAR RATE, ECG03: 72 BPM
WBC # BLD AUTO: 10.2 K/UL (ref 3.6–11)

## 2020-12-28 PROCEDURE — 80053 COMPREHEN METABOLIC PANEL: CPT

## 2020-12-28 PROCEDURE — 84484 ASSAY OF TROPONIN QUANT: CPT

## 2020-12-28 PROCEDURE — 85025 COMPLETE CBC W/AUTO DIFF WBC: CPT

## 2020-12-28 PROCEDURE — 99284 EMERGENCY DEPT VISIT MOD MDM: CPT

## 2020-12-28 PROCEDURE — 71046 X-RAY EXAM CHEST 2 VIEWS: CPT

## 2020-12-28 PROCEDURE — 93005 ELECTROCARDIOGRAM TRACING: CPT

## 2020-12-28 PROCEDURE — 73030 X-RAY EXAM OF SHOULDER: CPT

## 2020-12-28 PROCEDURE — 74011250637 HC RX REV CODE- 250/637: Performed by: EMERGENCY MEDICINE

## 2020-12-28 PROCEDURE — 36415 COLL VENOUS BLD VENIPUNCTURE: CPT

## 2020-12-28 RX ORDER — DICLOFENAC SODIUM 10 MG/G
GEL TOPICAL 4 TIMES DAILY
Qty: 100 G | Refills: 0 | Status: SHIPPED | OUTPATIENT
Start: 2020-12-28

## 2020-12-28 RX ORDER — ACETAMINOPHEN 500 MG
1000 TABLET ORAL
Status: COMPLETED | OUTPATIENT
Start: 2020-12-28 | End: 2020-12-28

## 2020-12-28 RX ORDER — ACETAMINOPHEN 500 MG
1000 TABLET ORAL
Qty: 20 TAB | Refills: 0 | Status: SHIPPED | OUTPATIENT
Start: 2020-12-28

## 2020-12-28 RX ADMIN — ACETAMINOPHEN 1000 MG: 500 TABLET ORAL at 16:46

## 2020-12-28 NOTE — ED NOTES
Pt arrived via EMS from assisted living c/o CP with radiation down L arm x1 day. Pt has hx of stent placement and HTN.

## 2020-12-28 NOTE — ED PROVIDER NOTES
The history is provided by the patient. Shoulder Pain   The incident occurred yesterday. The incident occurred at home. There was no injury mechanism. The left shoulder is affected. The pain is mild. The pain has been constant since onset. The pain radiates (down left arm). There is no history of shoulder injury. There is no history of shoulder surgery. Pertinent negatives include no numbness, no muscle weakness and no tingling.         Past Medical History:   Diagnosis Date    Arrhythmia     \"EXTRA BEAT EVERY NOW AND THEN\"    Breast cancer ([facility] 1997    lumpectomy, RADIATION    Cancer ([facility] 2002    dcis left breast 2002    Cancer [facility])     SKIN CANCER    Cardiomyopathy ([facility] [facility]     Chronic pain     Coronary artery disease involving native coronary artery of native heart without angina pectoris 12/16/2016    multiple stents    CVA (cerebral vascular accident) ([facility] [facility] 09/13/2017    Dyslipidemia     Heart failure (HCC)     CHF    High cholesterol     Hives of unknown origin     Hypertension     Local neurodermatitis     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     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    Venous insufficiency 5/17/2011       Past Surgical History:   Procedure Laterality Date    CARDIAC SURG PROCEDURE UNLIST  2015    STENT PLACEMENTS X2- \"LOTS OF STENTS\"    HX BREAST BIOPSY Left 1980`S    HX BREAST LUMPECTOMY  1988    dcis lelf breast    HX CATARACT REMOVAL Bilateral 1980`S    HX COLONOSCOPY      HX CORONARY STENT PLACEMENT  2015    X6    HX GI      COLONOSCOPY    HX HYSTERECTOMY  1960    HX ORTHOPAEDIC      RIGHT TKR    HX OTHER SURGICAL      ORAL    HX OTHER SURGICAL  07/2018    SQUAMOUS CELL CA REMOVED FROM RIGHT FOREARM     TOTAL HIP ARTHROPLASTY Left 2018    VASCULAR SURGERY PROCEDURE UNLIST      varicose veins         Family History:   Problem Relation Age of Onset    Cancer Sister         breast cancer    Breast Cancer Sister 61    Dementia Sister     Cancer Mother         PANCREAS     Diabetes Father     Other Father         PVD- AMPUTATION BL LE    Heart Disease Brother     Other Brother         ALS    Heart Failure Son     Heart Disease Son     Hypertension Son     No Known Problems Daughter     Heart Disease Brother     Heart Surgery Brother     Heart Attack Brother     Heart Disease Brother     Dementia Sister     No Known Problems Sister     Anesth Problems Neg Hx        Social History     Socioeconomic History    Marital status:      Spouse name: Not on file    Number of children: Not on file    Years of education: Not on file    Highest education level: Not on file   Occupational History    Not on file   Social Needs    Financial resource strain: Not on file    Food insecurity     Worry: Not on file     Inability: Not on file   [employer] needs     Medical: Not on file     Non-medical: Not on file   Tobacco Use    Smoking status: Never Smoker    Smokeless tobacco: Never Used   Substance and Sexual Activity    Alcohol use: No    Drug use: No    Sexual activity: Not on file   Lifestyle    Physical activity     Days per week: Not on file     Minutes per session: Not on file    Stress: Not on file   Relationships    Social connections     Talks on phone: Not on file     Gets together: Not on file     Attends Faith service: Not on file     Active member of club or organization: Not on file     Attends meetings of clubs or organizations: Not on file     Relationship status: Not on file    Intimate partner violence     Fear of current or ex partner: Not on file     Emotionally abused: Not on file     Physically abused: Not on file     Forced sexual activity: Not on file   Other Topics Concern    Not on file   Social History Narrative    Not on file         ALLERGIES: Amoxicillin-pot clavulanate, Cephalexin, Doxycycline, Neomycin-polymyxin-hc, Poison oak extract, Potassium clavulanate, Rocephin [ceftriaxone], and Sulfamethoxazole-trimethoprim    Review of Systems   Neurological: Negative for tingling and numbness. All other systems reviewed and are negative. Vitals:    12/28/20 1122   BP: 127/81   Pulse: 75   Resp: 18   Temp: 97.6 °F (36.4 °C)   SpO2: 97%   Weight: 77.1 kg (170 lb)   Height: 5' (1.524 m)            Physical Exam  Vitals signs and nursing note reviewed. Constitutional:       General: She is not in acute distress. Appearance: She is well-developed. HENT:      Head: Normocephalic and atraumatic. Eyes:      Conjunctiva/sclera: Conjunctivae normal.   Neck:      Musculoskeletal: Neck supple. Cardiovascular:      Rate and Rhythm: Normal rate and regular rhythm. Pulmonary:      Effort: Pulmonary effort is normal. No respiratory distress. Abdominal:      General: There is no distension. Musculoskeletal: Normal range of motion. General: No deformity. Left shoulder: She exhibits tenderness and pain (with abduction). She exhibits normal range of motion. Skin:     General: Skin is warm and dry. Neurological:      Mental Status: She is alert. Cranial Nerves: No cranial nerve deficit. Psychiatric:         Behavior: Behavior normal.          MDM     80 y.o. female presents with left shoulder pain radiating down left arm. She has cardiac risk factors but negative troponins x2 and nonischemic EKG with reproducible shoulder pain and atypical features. Imaging reassuring. Provided instructions for supportive care measures. Plan to follow up with PCP as needed and return precautions discussed for worsening or new concerning symptoms. Procedures    EKG : Rate 72, sinus rhythm with occasional PVCs now present. Remote infarcts noted from priors. Otherwise no significant change.

## 2021-10-25 ENCOUNTER — TRANSCRIBE ORDER (OUTPATIENT)
Dept: SCHEDULING | Age: 86
End: 2021-10-25

## 2021-10-25 DIAGNOSIS — Z12.31 VISIT FOR SCREENING MAMMOGRAM: Primary | ICD-10-CM

## 2021-12-14 ENCOUNTER — HOSPITAL ENCOUNTER (OUTPATIENT)
Dept: MAMMOGRAPHY | Age: 86
Discharge: HOME | End: 2021-12-14
Attending: FAMILY MEDICINE
Payer: MEDICARE

## 2021-12-14 DIAGNOSIS — Z12.31 VISIT FOR SCREENING MAMMOGRAM: ICD-10-CM

## 2021-12-14 PROCEDURE — 77063 BREAST TOMOSYNTHESIS BI: CPT

## (undated) DEVICE — HANDPIECE SET WITH BONE CLEANING TIP AND SUCTION TUBE: Brand: INTERPULSE

## (undated) DEVICE — REM POLYHESIVE ADULT PATIENT RETURN ELECTRODE: Brand: VALLEYLAB

## (undated) DEVICE — SYRINGE MED 20ML STD CLR PLAS LUERLOCK TIP N CTRL DISP

## (undated) DEVICE — STERILE POLYISOPRENE POWDER-FREE SURGICAL GLOVES WITH EMOLLIENT COATING: Brand: PROTEXIS

## (undated) DEVICE — SCRUB DRY SURG EZ SCRUB BRUSH PREOPERATIVE GRN

## (undated) DEVICE — 4-PORT MANIFOLD: Brand: NEPTUNE 2

## (undated) DEVICE — DRAPE XR C ARM 41X74IN LF --

## (undated) DEVICE — PADDING CST 6IN STERILE --

## (undated) DEVICE — SOLUTION IRRIG 3000ML 0.9% SOD CHL FLX CONT 0797208] ICU MEDICAL INC]

## (undated) DEVICE — SOLUTION IRRIG 1000ML H2O STRL BLT

## (undated) DEVICE — SUTURE VCRL SZ 2 L54IN ABSRB UD L65MM TP-1 1/2 CIR J880T

## (undated) DEVICE — 3000CC GUARDIAN II: Brand: GUARDIAN

## (undated) DEVICE — PREP SKN PREVAIL 40ML APPL --

## (undated) DEVICE — INFECTION CONTROL KIT SYS

## (undated) DEVICE — DEVON™ KNEE AND BODY STRAP 60" X 3" (1.5 M X 7.6 CM): Brand: DEVON

## (undated) DEVICE — T4 HOOD

## (undated) DEVICE — CONTAINER,SPECIMEN,3OZ,OR STRL: Brand: MEDLINE

## (undated) DEVICE — TRAY CATH 16F URIN MTR LTX -- CONVERT TO ITEM 363111

## (undated) DEVICE — BOWL BNE CEM MIX SPAT CURET SMARTMIX CTS

## (undated) DEVICE — Device

## (undated) DEVICE — SUTURE VCRL SZ 2-0 L36IN ABSRB UD L40MM CT 1/2 CIR J957H

## (undated) DEVICE — BLADE SAW W0.49XL3.15IN THK0.047IN CUT THK0.047IN REPL SAG

## (undated) DEVICE — 6619 2 PTNT ISO SYS INCISE AREA&LT;(&GT;&&LT;)&GT;P: Brand: STERI-DRAPE™ IOBAN™ 2

## (undated) DEVICE — ADHESIVE SKIN CLOSURE 4X22 CM PREMIERPRO EXOFINFUSION DISP

## (undated) DEVICE — SUTURE ABSORBABLE BRAIDED 2-0 CT-1 27 IN UD VICRYL J259H

## (undated) DEVICE — SLIM BODY SKIN STAPLER: Brand: APPOSE ULC

## (undated) DEVICE — BLADE RMFG SAG LG 19.1X70X1MM --

## (undated) DEVICE — ZIMMER® STERILE DISPOSABLE TOURNIQUET CUFF WITH PLC, DUAL PORT, SINGLE BLADDER, 34 IN. (86 CM)

## (undated) DEVICE — PADDING CAST SPEC 6INX4YD COT --

## (undated) DEVICE — NEEDLE HYPO 18GA L1.5IN PNK S STL HUB POLYPR SHLD REG BVL

## (undated) DEVICE — COVER,MAYO STAND,STERILE: Brand: MEDLINE

## (undated) DEVICE — DRAPE,EXTREMITY,89X128,STERILE: Brand: MEDLINE

## (undated) DEVICE — STERILE POLYISOPRENE POWDER-FREE SURGICAL GLOVES: Brand: PROTEXIS

## (undated) DEVICE — BANDAGE COMPR ELASTIC 5 YDX6 IN

## (undated) DEVICE — SOLUTION SURG PREP 26 CC PURPREP

## (undated) DEVICE — BLADE SAW W073XL276IN THK0031IN CUT THK0036IN REPL SAG

## (undated) DEVICE — SUTURE STRATAFIX SYMMETRIC PDS + SZ 1 L18IN ABSRB VLT L48MM SXPP1A400

## (undated) DEVICE — 3M™ IOBAN™ 2 ANTIMICROBIAL INCISE DRAPE 6651EZ: Brand: IOBAN™ 2

## (undated) DEVICE — SOLUTION IV 50ML 0.9% SOD CHL

## (undated) DEVICE — SUTURE VCRL SZ 2-0 L27IN ABSRB UD L26MM SH 1/2 CIR J417H

## (undated) DEVICE — NEEDLE HYPO 21GA L1.5IN INTRAMUSCULAR S STL LATCH BVL UP

## (undated) DEVICE — PAD 05IN BASE 3IN PEAK M DENS CONVOLUTED FOAM

## (undated) DEVICE — DRAPE,U/ SHT,SPLIT,PLAS,STERIL: Brand: MEDLINE

## (undated) DEVICE — DRSG POSTOP PRMSL AG 3.5X14IN

## (undated) DEVICE — DRSG AQUACEL SURG 3.5 X 10IN -- CONVERT TO ITEM 370183

## (undated) DEVICE — 3.8MM DIA DRILL BIT

## (undated) DEVICE — HOOK LOCK LATEX FREE ELASTIC BANDAGE D/L 6INX10YD

## (undated) DEVICE — SUTURE MCRYL SZ 2-0 L36IN ABSRB UD L36MM CT-1 1/2 CIR Y945H

## (undated) DEVICE — SUTURE VCRL SZ 0 L36IN ABSRB VLT L40MM CT 1/2 CIR J358H

## (undated) DEVICE — SUTURE MCRYL SZ 4 0 L18IN ABSRB VLT PS 1 L24MM 3 8 CIR REV Y682H

## (undated) DEVICE — SYR 20ML LL STRL LF --

## (undated) DEVICE — Z DISCONTINUED USE 2744636  DRESSING AQUACEL 14 IN ALG W3.5XL14IN POLYUR FLM CVR W/ HYDRCOLL